# Patient Record
Sex: MALE | Race: WHITE | NOT HISPANIC OR LATINO | Employment: FULL TIME | URBAN - METROPOLITAN AREA
[De-identification: names, ages, dates, MRNs, and addresses within clinical notes are randomized per-mention and may not be internally consistent; named-entity substitution may affect disease eponyms.]

---

## 2022-01-01 ENCOUNTER — APPOINTMENT (OUTPATIENT)
Dept: GENERAL RADIOLOGY | Facility: CLINIC | Age: 62
DRG: 003 | End: 2022-01-01
Attending: INTERNAL MEDICINE
Payer: COMMERCIAL

## 2022-01-01 ENCOUNTER — APPOINTMENT (OUTPATIENT)
Dept: CT IMAGING | Facility: CLINIC | Age: 62
DRG: 003 | End: 2022-01-01
Attending: INTERNAL MEDICINE
Payer: COMMERCIAL

## 2022-01-01 ENCOUNTER — APPOINTMENT (OUTPATIENT)
Dept: NEUROLOGY | Facility: CLINIC | Age: 62
DRG: 003 | End: 2022-01-01
Attending: INTERNAL MEDICINE
Payer: COMMERCIAL

## 2022-01-01 ENCOUNTER — APPOINTMENT (OUTPATIENT)
Dept: CT IMAGING | Facility: CLINIC | Age: 62
DRG: 003 | End: 2022-01-01
Attending: NURSE PRACTITIONER
Payer: COMMERCIAL

## 2022-01-01 ENCOUNTER — ANESTHESIA EVENT (OUTPATIENT)
Dept: SURGERY | Facility: CLINIC | Age: 62
DRG: 003 | End: 2022-01-01
Payer: COMMERCIAL

## 2022-01-01 ENCOUNTER — APPOINTMENT (OUTPATIENT)
Dept: MRI IMAGING | Facility: CLINIC | Age: 62
DRG: 003 | End: 2022-01-01
Payer: COMMERCIAL

## 2022-01-01 ENCOUNTER — APPOINTMENT (OUTPATIENT)
Dept: CARDIOLOGY | Facility: CLINIC | Age: 62
DRG: 003 | End: 2022-01-01
Attending: INTERNAL MEDICINE
Payer: COMMERCIAL

## 2022-01-01 ENCOUNTER — APPOINTMENT (OUTPATIENT)
Dept: GENERAL RADIOLOGY | Facility: CLINIC | Age: 62
DRG: 003 | End: 2022-01-01
Payer: COMMERCIAL

## 2022-01-01 ENCOUNTER — APPOINTMENT (OUTPATIENT)
Dept: ULTRASOUND IMAGING | Facility: CLINIC | Age: 62
DRG: 003 | End: 2022-01-01
Payer: COMMERCIAL

## 2022-01-01 ENCOUNTER — APPOINTMENT (OUTPATIENT)
Dept: CARDIOLOGY | Facility: CLINIC | Age: 62
DRG: 003 | End: 2022-01-01
Attending: PHYSICIAN ASSISTANT
Payer: COMMERCIAL

## 2022-01-01 ENCOUNTER — APPOINTMENT (OUTPATIENT)
Dept: CT IMAGING | Facility: CLINIC | Age: 62
DRG: 003 | End: 2022-01-01
Attending: PHYSICIAN ASSISTANT
Payer: COMMERCIAL

## 2022-01-01 ENCOUNTER — ANESTHESIA EVENT (OUTPATIENT)
Dept: CARDIOLOGY | Facility: CLINIC | Age: 62
DRG: 003 | End: 2022-01-01
Payer: COMMERCIAL

## 2022-01-01 ENCOUNTER — APPOINTMENT (OUTPATIENT)
Dept: CARDIOLOGY | Facility: CLINIC | Age: 62
DRG: 003 | End: 2022-01-01
Payer: COMMERCIAL

## 2022-01-01 ENCOUNTER — APPOINTMENT (OUTPATIENT)
Dept: GENERAL RADIOLOGY | Facility: CLINIC | Age: 62
DRG: 003 | End: 2022-01-01
Attending: STUDENT IN AN ORGANIZED HEALTH CARE EDUCATION/TRAINING PROGRAM
Payer: COMMERCIAL

## 2022-01-01 ENCOUNTER — ANESTHESIA (OUTPATIENT)
Dept: SURGERY | Facility: CLINIC | Age: 62
DRG: 003 | End: 2022-01-01
Payer: COMMERCIAL

## 2022-01-01 ENCOUNTER — HOSPITAL ENCOUNTER (INPATIENT)
Facility: CLINIC | Age: 62
LOS: 14 days | DRG: 003 | End: 2022-09-26
Attending: EMERGENCY MEDICINE | Admitting: STUDENT IN AN ORGANIZED HEALTH CARE EDUCATION/TRAINING PROGRAM
Payer: COMMERCIAL

## 2022-01-01 ENCOUNTER — ANESTHESIA (OUTPATIENT)
Dept: CARDIOLOGY | Facility: CLINIC | Age: 62
DRG: 003 | End: 2022-01-01
Payer: COMMERCIAL

## 2022-01-01 ENCOUNTER — APPOINTMENT (OUTPATIENT)
Dept: ULTRASOUND IMAGING | Facility: CLINIC | Age: 62
DRG: 003 | End: 2022-01-01
Attending: INTERNAL MEDICINE
Payer: COMMERCIAL

## 2022-01-01 VITALS
RESPIRATION RATE: 12 BRPM | SYSTOLIC BLOOD PRESSURE: 130 MMHG | TEMPERATURE: 101.1 F | WEIGHT: 236.99 LBS | HEIGHT: 67 IN | DIASTOLIC BLOOD PRESSURE: 66 MMHG | BODY MASS INDEX: 37.2 KG/M2 | OXYGEN SATURATION: 97 %

## 2022-01-01 DIAGNOSIS — I49.01 VENTRICULAR FIBRILLATION (H): ICD-10-CM

## 2022-01-01 DIAGNOSIS — Z11.52 ENCOUNTER FOR SCREENING LABORATORY TESTING FOR SEVERE ACUTE RESPIRATORY SYNDROME CORONAVIRUS 2 (SARS-COV-2): ICD-10-CM

## 2022-01-01 DIAGNOSIS — I21.01 ACUTE ST ELEVATION MYOCARDIAL INFARCTION (STEMI) INVOLVING LEFT MAIN CORONARY ARTERY (H): Primary | ICD-10-CM

## 2022-01-01 DIAGNOSIS — I46.9 CARDIAC ARREST (H): ICD-10-CM

## 2022-01-01 DIAGNOSIS — I21.3 ST ELEVATION MI (STEMI) (H): ICD-10-CM

## 2022-01-01 LAB
7AMINOCLONAZEPAM SERPL-MCNC: NEGATIVE NG/ML
7AMINOCLONAZEPAM SERPL-MCNC: NEGATIVE NG/ML
ABO/RH(D): NORMAL
ACT BLD: 130 SECONDS (ref 74–150)
ACT BLD: 131 SECONDS (ref 74–150)
ACT BLD: 135 SECONDS (ref 74–150)
ACT BLD: 139 SECONDS (ref 74–150)
ACT BLD: 143 SECONDS (ref 74–150)
ACT BLD: 148 SECONDS (ref 74–150)
ACT BLD: 148 SECONDS (ref 74–150)
ACT BLD: 152 SECONDS (ref 74–150)
ACT BLD: 156 SECONDS (ref 74–150)
ACT BLD: 160 SECONDS (ref 74–150)
ACT BLD: 165 SECONDS (ref 74–150)
ACT BLD: 169 SECONDS (ref 74–150)
ACT BLD: 169 SECONDS (ref 74–150)
ACT BLD: 173 SECONDS (ref 74–150)
ACT BLD: 177 SECONDS (ref 74–150)
ACT BLD: 182 SECONDS (ref 74–150)
ACT BLD: 186 SECONDS (ref 74–150)
ACT BLD: 194 SECONDS (ref 74–150)
ACT BLD: 194 SECONDS (ref 74–150)
ACT BLD: 224 SECONDS (ref 74–150)
ACT BLD: 233 SECONDS (ref 74–150)
ACT BLD: 241 SECONDS (ref 74–150)
ACT BLD: 241 SECONDS (ref 74–150)
ACT BLD: 250 SECONDS (ref 74–150)
ACT BLD: 254 SECONDS (ref 74–150)
ACT BLD: 275 SECONDS (ref 74–150)
ACT BLD: 280 SECONDS (ref 74–150)
ACT BLD: 280 SECONDS (ref 74–150)
ACT BLD: 297 SECONDS (ref 74–150)
ACT BLD: 318 SECONDS (ref 74–150)
ACT BLD: 394 SECONDS (ref 74–150)
ALBUMIN SERPL BCG-MCNC: 2.1 G/DL (ref 3.5–5.2)
ALBUMIN SERPL BCG-MCNC: 2.3 G/DL (ref 3.5–5.2)
ALBUMIN SERPL BCG-MCNC: 2.4 G/DL (ref 3.5–5.2)
ALBUMIN SERPL BCG-MCNC: 2.5 G/DL (ref 3.5–5.2)
ALBUMIN SERPL BCG-MCNC: 2.6 G/DL (ref 3.5–5.2)
ALBUMIN SERPL BCG-MCNC: 2.7 G/DL (ref 3.5–5.2)
ALBUMIN SERPL BCG-MCNC: 2.9 G/DL (ref 3.5–5.2)
ALBUMIN SERPL BCG-MCNC: 2.9 G/DL (ref 3.5–5.2)
ALBUMIN SERPL BCG-MCNC: 3 G/DL (ref 3.5–5.2)
ALBUMIN SERPL BCG-MCNC: 3 G/DL (ref 3.5–5.2)
ALBUMIN SERPL BCG-MCNC: 3.1 G/DL (ref 3.5–5.2)
ALBUMIN SERPL BCG-MCNC: 3.2 G/DL (ref 3.5–5.2)
ALBUMIN UR-MCNC: 10 MG/DL
ALBUMIN UR-MCNC: 50 MG/DL
ALP SERPL-CCNC: 101 U/L (ref 40–129)
ALP SERPL-CCNC: 101 U/L (ref 40–129)
ALP SERPL-CCNC: 102 U/L (ref 40–129)
ALP SERPL-CCNC: 102 U/L (ref 40–129)
ALP SERPL-CCNC: 103 U/L (ref 40–129)
ALP SERPL-CCNC: 106 U/L (ref 40–129)
ALP SERPL-CCNC: 106 U/L (ref 40–129)
ALP SERPL-CCNC: 109 U/L (ref 40–129)
ALP SERPL-CCNC: 110 U/L (ref 40–129)
ALP SERPL-CCNC: 112 U/L (ref 40–129)
ALP SERPL-CCNC: 114 U/L (ref 40–129)
ALP SERPL-CCNC: 115 U/L (ref 40–129)
ALP SERPL-CCNC: 117 U/L (ref 40–129)
ALP SERPL-CCNC: 118 U/L (ref 40–129)
ALP SERPL-CCNC: 121 U/L (ref 40–129)
ALP SERPL-CCNC: 122 U/L (ref 40–129)
ALP SERPL-CCNC: 130 U/L (ref 40–129)
ALP SERPL-CCNC: 136 U/L (ref 40–129)
ALP SERPL-CCNC: 139 U/L (ref 40–129)
ALP SERPL-CCNC: 143 U/L (ref 40–129)
ALP SERPL-CCNC: 144 U/L (ref 40–129)
ALP SERPL-CCNC: 146 U/L (ref 40–129)
ALP SERPL-CCNC: 148 U/L (ref 40–129)
ALP SERPL-CCNC: 67 U/L (ref 40–129)
ALP SERPL-CCNC: 72 U/L (ref 40–129)
ALP SERPL-CCNC: 74 U/L (ref 40–129)
ALP SERPL-CCNC: 74 U/L (ref 40–129)
ALP SERPL-CCNC: 75 U/L (ref 40–129)
ALP SERPL-CCNC: 77 U/L (ref 40–129)
ALP SERPL-CCNC: 78 U/L (ref 40–129)
ALP SERPL-CCNC: 82 U/L (ref 40–129)
ALP SERPL-CCNC: 86 U/L (ref 40–129)
ALP SERPL-CCNC: 88 U/L (ref 40–129)
ALP SERPL-CCNC: 88 U/L (ref 40–129)
ALP SERPL-CCNC: 90 U/L (ref 40–129)
ALP SERPL-CCNC: 91 U/L (ref 40–129)
ALP SERPL-CCNC: 92 U/L (ref 40–129)
ALP SERPL-CCNC: 94 U/L (ref 40–129)
ALP SERPL-CCNC: 94 U/L (ref 40–129)
ALP SERPL-CCNC: 95 U/L (ref 40–129)
ALP SERPL-CCNC: 98 U/L (ref 40–129)
ALP SERPL-CCNC: 98 U/L (ref 40–129)
ALP SERPL-CCNC: 99 U/L (ref 40–129)
ALPRAZ SERPL-MCNC: NEGATIVE NG/ML
ALPRAZ SERPL-MCNC: NEGATIVE NG/ML
ALT SERPL W P-5'-P-CCNC: 14 U/L (ref 10–50)
ALT SERPL W P-5'-P-CCNC: 15 U/L (ref 10–50)
ALT SERPL W P-5'-P-CCNC: 16 U/L (ref 10–50)
ALT SERPL W P-5'-P-CCNC: 17 U/L (ref 10–50)
ALT SERPL W P-5'-P-CCNC: 18 U/L (ref 10–50)
ALT SERPL W P-5'-P-CCNC: 18 U/L (ref 10–50)
ALT SERPL W P-5'-P-CCNC: 19 U/L (ref 10–50)
ALT SERPL W P-5'-P-CCNC: 20 U/L (ref 10–50)
ALT SERPL W P-5'-P-CCNC: 20 U/L (ref 10–50)
ALT SERPL W P-5'-P-CCNC: 21 U/L (ref 10–50)
ALT SERPL W P-5'-P-CCNC: 23 U/L (ref 10–50)
ALT SERPL W P-5'-P-CCNC: 24 U/L (ref 10–50)
ALT SERPL W P-5'-P-CCNC: 25 U/L (ref 10–50)
ALT SERPL W P-5'-P-CCNC: 27 U/L (ref 10–50)
ALT SERPL W P-5'-P-CCNC: 29 U/L (ref 10–50)
ALT SERPL W P-5'-P-CCNC: 31 U/L (ref 10–50)
ALT SERPL W P-5'-P-CCNC: 33 U/L (ref 10–50)
ALT SERPL W P-5'-P-CCNC: 33 U/L (ref 10–50)
ALT SERPL W P-5'-P-CCNC: 34 U/L (ref 10–50)
ALT SERPL W P-5'-P-CCNC: 36 U/L (ref 10–50)
ALT SERPL W P-5'-P-CCNC: 38 U/L (ref 10–50)
ALT SERPL W P-5'-P-CCNC: 38 U/L (ref 10–50)
ALT SERPL W P-5'-P-CCNC: 39 U/L (ref 10–50)
ALT SERPL W P-5'-P-CCNC: 39 U/L (ref 10–50)
ALT SERPL W P-5'-P-CCNC: 41 U/L (ref 10–50)
ALT SERPL W P-5'-P-CCNC: 42 U/L (ref 10–50)
ALT SERPL W P-5'-P-CCNC: 45 U/L (ref 10–50)
ALT SERPL W P-5'-P-CCNC: 46 U/L (ref 10–50)
ALT SERPL W P-5'-P-CCNC: 47 U/L (ref 10–50)
ALT SERPL W P-5'-P-CCNC: 49 U/L (ref 10–50)
ALT SERPL W P-5'-P-CCNC: 50 U/L (ref 10–50)
ALT SERPL W P-5'-P-CCNC: 50 U/L (ref 10–50)
ALT SERPL W P-5'-P-CCNC: 54 U/L (ref 10–50)
ALT SERPL W P-5'-P-CCNC: 57 U/L (ref 10–50)
ALT SERPL W P-5'-P-CCNC: 58 U/L (ref 10–50)
AMPHET BLD CFM-MCNC: NEGATIVE NG/ML
AMPHETAMINES UR QL SCN: ABNORMAL
ANION GAP SERPL CALCULATED.3IONS-SCNC: 10 MMOL/L (ref 7–15)
ANION GAP SERPL CALCULATED.3IONS-SCNC: 11 MMOL/L (ref 7–15)
ANION GAP SERPL CALCULATED.3IONS-SCNC: 12 MMOL/L (ref 7–15)
ANION GAP SERPL CALCULATED.3IONS-SCNC: 14 MMOL/L (ref 7–15)
ANION GAP SERPL CALCULATED.3IONS-SCNC: 4 MMOL/L (ref 7–15)
ANION GAP SERPL CALCULATED.3IONS-SCNC: 4 MMOL/L (ref 7–15)
ANION GAP SERPL CALCULATED.3IONS-SCNC: 5 MMOL/L (ref 7–15)
ANION GAP SERPL CALCULATED.3IONS-SCNC: 6 MMOL/L (ref 7–15)
ANION GAP SERPL CALCULATED.3IONS-SCNC: 7 MMOL/L (ref 7–15)
ANION GAP SERPL CALCULATED.3IONS-SCNC: 8 MMOL/L (ref 7–15)
ANION GAP SERPL CALCULATED.3IONS-SCNC: 9 MMOL/L (ref 7–15)
ANTIBODY SCREEN: NEGATIVE
APAP BLD-MCNC: NEGATIVE UG/ML
APPEARANCE UR: CLEAR
APPEARANCE UR: CLEAR
APTT PPP: 103 SECONDS (ref 22–38)
APTT PPP: 108 SECONDS (ref 22–38)
APTT PPP: 110 SECONDS (ref 22–38)
APTT PPP: 110 SECONDS (ref 22–38)
APTT PPP: 113 SECONDS (ref 22–38)
APTT PPP: 128 SECONDS (ref 22–38)
APTT PPP: 128 SECONDS (ref 22–38)
APTT PPP: 134 SECONDS (ref 22–38)
APTT PPP: 137 SECONDS (ref 22–38)
APTT PPP: 144 SECONDS (ref 22–38)
APTT PPP: 28 SECONDS (ref 22–38)
APTT PPP: 32 SECONDS (ref 22–38)
APTT PPP: 33 SECONDS (ref 22–38)
APTT PPP: 34 SECONDS (ref 22–38)
APTT PPP: 34 SECONDS (ref 22–38)
APTT PPP: 35 SECONDS (ref 22–38)
APTT PPP: 36 SECONDS (ref 22–38)
APTT PPP: 36 SECONDS (ref 22–38)
APTT PPP: 40 SECONDS (ref 22–38)
APTT PPP: 43 SECONDS (ref 22–38)
APTT PPP: 44 SECONDS (ref 22–38)
APTT PPP: 45 SECONDS (ref 22–38)
APTT PPP: 57 SECONDS (ref 22–38)
APTT PPP: 74 SECONDS (ref 22–38)
APTT PPP: 74 SECONDS (ref 22–38)
APTT PPP: 78 SECONDS (ref 22–38)
APTT PPP: 79 SECONDS (ref 22–38)
APTT PPP: 79 SECONDS (ref 22–38)
APTT PPP: 88 SECONDS (ref 22–38)
APTT PPP: 89 SECONDS (ref 22–38)
APTT PPP: 89 SECONDS (ref 22–38)
APTT PPP: 91 SECONDS (ref 22–38)
APTT PPP: >240 SECONDS (ref 22–38)
APTT PPP: >240 SECONDS (ref 22–38)
AST SERPL W P-5'-P-CCNC: 100 U/L (ref 10–50)
AST SERPL W P-5'-P-CCNC: 100 U/L (ref 10–50)
AST SERPL W P-5'-P-CCNC: 113 U/L (ref 10–50)
AST SERPL W P-5'-P-CCNC: 130 U/L (ref 10–50)
AST SERPL W P-5'-P-CCNC: 25 U/L (ref 10–50)
AST SERPL W P-5'-P-CCNC: 26 U/L (ref 10–50)
AST SERPL W P-5'-P-CCNC: 27 U/L (ref 10–50)
AST SERPL W P-5'-P-CCNC: 27 U/L (ref 10–50)
AST SERPL W P-5'-P-CCNC: 28 U/L (ref 10–50)
AST SERPL W P-5'-P-CCNC: 28 U/L (ref 10–50)
AST SERPL W P-5'-P-CCNC: 29 U/L (ref 10–50)
AST SERPL W P-5'-P-CCNC: 30 U/L (ref 10–50)
AST SERPL W P-5'-P-CCNC: 30 U/L (ref 10–50)
AST SERPL W P-5'-P-CCNC: 32 U/L (ref 10–50)
AST SERPL W P-5'-P-CCNC: 33 U/L (ref 10–50)
AST SERPL W P-5'-P-CCNC: 33 U/L (ref 10–50)
AST SERPL W P-5'-P-CCNC: 34 U/L (ref 10–50)
AST SERPL W P-5'-P-CCNC: 35 U/L (ref 10–50)
AST SERPL W P-5'-P-CCNC: 35 U/L (ref 10–50)
AST SERPL W P-5'-P-CCNC: 36 U/L (ref 10–50)
AST SERPL W P-5'-P-CCNC: 36 U/L (ref 10–50)
AST SERPL W P-5'-P-CCNC: 37 U/L (ref 10–50)
AST SERPL W P-5'-P-CCNC: 38 U/L (ref 10–50)
AST SERPL W P-5'-P-CCNC: 39 U/L (ref 10–50)
AST SERPL W P-5'-P-CCNC: 40 U/L (ref 10–50)
AST SERPL W P-5'-P-CCNC: 43 U/L (ref 10–50)
AST SERPL W P-5'-P-CCNC: 46 U/L (ref 10–50)
AST SERPL W P-5'-P-CCNC: 47 U/L (ref 10–50)
AST SERPL W P-5'-P-CCNC: 48 U/L (ref 10–50)
AST SERPL W P-5'-P-CCNC: 48 U/L (ref 10–50)
AST SERPL W P-5'-P-CCNC: 50 U/L (ref 10–50)
AST SERPL W P-5'-P-CCNC: 50 U/L (ref 10–50)
AST SERPL W P-5'-P-CCNC: 53 U/L (ref 10–50)
AST SERPL W P-5'-P-CCNC: 55 U/L (ref 10–50)
AST SERPL W P-5'-P-CCNC: 65 U/L (ref 10–50)
AST SERPL W P-5'-P-CCNC: 66 U/L (ref 10–50)
AST SERPL W P-5'-P-CCNC: 70 U/L (ref 10–50)
AST SERPL W P-5'-P-CCNC: 73 U/L (ref 10–50)
AST SERPL W P-5'-P-CCNC: 73 U/L (ref 10–50)
AST SERPL W P-5'-P-CCNC: 77 U/L (ref 10–50)
AST SERPL W P-5'-P-CCNC: 78 U/L (ref 10–50)
AST SERPL W P-5'-P-CCNC: 81 U/L (ref 10–50)
AST SERPL W P-5'-P-CCNC: 82 U/L (ref 10–50)
AST SERPL W P-5'-P-CCNC: 82 U/L (ref 10–50)
AST SERPL W P-5'-P-CCNC: 95 U/L (ref 10–50)
AT III ACT/NOR PPP CHRO: 66 % (ref 85–135)
AT III ACT/NOR PPP CHRO: 68 % (ref 85–135)
AT III ACT/NOR PPP CHRO: 68 % (ref 85–135)
AT III ACT/NOR PPP CHRO: 74 % (ref 85–135)
AT III ACT/NOR PPP CHRO: 79 % (ref 85–135)
AT III ACT/NOR PPP CHRO: 92 % (ref 85–135)
ATRIAL RATE - MUSE: 56 BPM
ATRIAL RATE - MUSE: 63 BPM
ATRIAL RATE - MUSE: 63 BPM
ATRIAL RATE - MUSE: 67 BPM
ATRIAL RATE - MUSE: 68 BPM
ATRIAL RATE - MUSE: 68 BPM
ATRIAL RATE - MUSE: 70 BPM
ATRIAL RATE - MUSE: 74 BPM
ATRIAL RATE - MUSE: 77 BPM
ATRIAL RATE - MUSE: 78 BPM
ATRIAL RATE - MUSE: 80 BPM
ATRIAL RATE - MUSE: 86 BPM
ATRIAL RATE - MUSE: 92 BPM
ATRIAL RATE - MUSE: 95 BPM
BACTERIA BLD CULT: NO GROWTH
BACTERIA SPT CULT: NO GROWTH
BACTERIA SPT CULT: NORMAL
BACTERIA UR CULT: NO GROWTH
BARBITURATES SPEC-MCNC: NEGATIVE UG/ML
BARBITURATES UR QL SCN: ABNORMAL
BASE EXCESS BLDA CALC-SCNC: -0.1 MMOL/L (ref -9–1.8)
BASE EXCESS BLDA CALC-SCNC: -0.3 MMOL/L (ref -9–1.8)
BASE EXCESS BLDA CALC-SCNC: -0.5 MMOL/L (ref -9–1.8)
BASE EXCESS BLDA CALC-SCNC: -0.8 MMOL/L (ref -9–1.8)
BASE EXCESS BLDA CALC-SCNC: -1.1 MMOL/L (ref -9–1.8)
BASE EXCESS BLDA CALC-SCNC: -1.1 MMOL/L (ref -9–1.8)
BASE EXCESS BLDA CALC-SCNC: -1.4 MMOL/L (ref -9–1.8)
BASE EXCESS BLDA CALC-SCNC: -1.7 MMOL/L (ref -9–1.8)
BASE EXCESS BLDA CALC-SCNC: -1.8 MMOL/L (ref -9–1.8)
BASE EXCESS BLDA CALC-SCNC: -1.9 MMOL/L (ref -9–1.8)
BASE EXCESS BLDA CALC-SCNC: -2.2 MMOL/L (ref -9–1.8)
BASE EXCESS BLDA CALC-SCNC: -2.9 MMOL/L (ref -9.6–2)
BASE EXCESS BLDA CALC-SCNC: -3.2 MMOL/L (ref -9–1.8)
BASE EXCESS BLDA CALC-SCNC: -3.3 MMOL/L (ref -9–1.8)
BASE EXCESS BLDA CALC-SCNC: -3.4 MMOL/L (ref -9–1.8)
BASE EXCESS BLDA CALC-SCNC: -3.6 MMOL/L (ref -9–1.8)
BASE EXCESS BLDA CALC-SCNC: -3.6 MMOL/L (ref -9–1.8)
BASE EXCESS BLDA CALC-SCNC: -3.7 MMOL/L (ref -9–1.8)
BASE EXCESS BLDA CALC-SCNC: -5 MMOL/L (ref -9–1.8)
BASE EXCESS BLDA CALC-SCNC: -5.4 MMOL/L (ref -9.6–2)
BASE EXCESS BLDA CALC-SCNC: -5.7 MMOL/L (ref -9.6–2)
BASE EXCESS BLDA CALC-SCNC: -6.9 MMOL/L (ref -9.6–2)
BASE EXCESS BLDA CALC-SCNC: -7.1 MMOL/L (ref -9.6–2)
BASE EXCESS BLDA CALC-SCNC: 0.1 MMOL/L (ref -9–1.8)
BASE EXCESS BLDA CALC-SCNC: 0.8 MMOL/L (ref -9–1.8)
BASE EXCESS BLDA CALC-SCNC: 1.2 MMOL/L (ref -9–1.8)
BASE EXCESS BLDA CALC-SCNC: 1.3 MMOL/L (ref -9–1.8)
BASE EXCESS BLDA CALC-SCNC: 1.5 MMOL/L (ref -9–1.8)
BASE EXCESS BLDA CALC-SCNC: 1.9 MMOL/L (ref -9–1.8)
BASE EXCESS BLDA CALC-SCNC: 10.8 MMOL/L (ref -9–1.8)
BASE EXCESS BLDA CALC-SCNC: 11 MMOL/L (ref -9.6–2)
BASE EXCESS BLDA CALC-SCNC: 2.3 MMOL/L (ref -9–1.8)
BASE EXCESS BLDA CALC-SCNC: 2.3 MMOL/L (ref -9–1.8)
BASE EXCESS BLDA CALC-SCNC: 3.5 MMOL/L (ref -9–1.8)
BASE EXCESS BLDA CALC-SCNC: 3.7 MMOL/L (ref -9–1.8)
BASE EXCESS BLDA CALC-SCNC: 3.8 MMOL/L (ref -9–1.8)
BASE EXCESS BLDA CALC-SCNC: 3.8 MMOL/L (ref -9–1.8)
BASE EXCESS BLDA CALC-SCNC: 3.9 MMOL/L (ref -9–1.8)
BASE EXCESS BLDA CALC-SCNC: 4.1 MMOL/L (ref -9–1.8)
BASE EXCESS BLDA CALC-SCNC: 4.2 MMOL/L (ref -9–1.8)
BASE EXCESS BLDA CALC-SCNC: 4.3 MMOL/L (ref -9–1.8)
BASE EXCESS BLDA CALC-SCNC: 4.3 MMOL/L (ref -9–1.8)
BASE EXCESS BLDA CALC-SCNC: 4.4 MMOL/L (ref -9–1.8)
BASE EXCESS BLDA CALC-SCNC: 4.5 MMOL/L (ref -9–1.8)
BASE EXCESS BLDA CALC-SCNC: 4.5 MMOL/L (ref -9–1.8)
BASE EXCESS BLDA CALC-SCNC: 4.6 MMOL/L (ref -9–1.8)
BASE EXCESS BLDA CALC-SCNC: 4.6 MMOL/L (ref -9–1.8)
BASE EXCESS BLDA CALC-SCNC: 4.7 MMOL/L (ref -9–1.8)
BASE EXCESS BLDA CALC-SCNC: 4.8 MMOL/L (ref -9–1.8)
BASE EXCESS BLDA CALC-SCNC: 4.9 MMOL/L (ref -9–1.8)
BASE EXCESS BLDA CALC-SCNC: 4.9 MMOL/L (ref -9–1.8)
BASE EXCESS BLDA CALC-SCNC: 5 MMOL/L (ref -9–1.8)
BASE EXCESS BLDA CALC-SCNC: 5 MMOL/L (ref -9–1.8)
BASE EXCESS BLDA CALC-SCNC: 5.1 MMOL/L (ref -9–1.8)
BASE EXCESS BLDA CALC-SCNC: 5.1 MMOL/L (ref -9–1.8)
BASE EXCESS BLDA CALC-SCNC: 5.2 MMOL/L (ref -9–1.8)
BASE EXCESS BLDA CALC-SCNC: 5.3 MMOL/L (ref -9–1.8)
BASE EXCESS BLDA CALC-SCNC: 5.4 MMOL/L (ref -9–1.8)
BASE EXCESS BLDA CALC-SCNC: 5.5 MMOL/L (ref -9–1.8)
BASE EXCESS BLDA CALC-SCNC: 5.5 MMOL/L (ref -9–1.8)
BASE EXCESS BLDA CALC-SCNC: 5.6 MMOL/L (ref -9.6–2)
BASE EXCESS BLDA CALC-SCNC: 5.6 MMOL/L (ref -9–1.8)
BASE EXCESS BLDA CALC-SCNC: 5.6 MMOL/L (ref -9–1.8)
BASE EXCESS BLDA CALC-SCNC: 5.7 MMOL/L (ref -9.6–2)
BASE EXCESS BLDA CALC-SCNC: 5.7 MMOL/L (ref -9–1.8)
BASE EXCESS BLDA CALC-SCNC: 5.8 MMOL/L (ref -9–1.8)
BASE EXCESS BLDA CALC-SCNC: 5.9 MMOL/L (ref -9–1.8)
BASE EXCESS BLDA CALC-SCNC: 5.9 MMOL/L (ref -9–1.8)
BASE EXCESS BLDA CALC-SCNC: 6 MMOL/L (ref -9–1.8)
BASE EXCESS BLDA CALC-SCNC: 6 MMOL/L (ref -9–1.8)
BASE EXCESS BLDA CALC-SCNC: 6.1 MMOL/L (ref -9–1.8)
BASE EXCESS BLDA CALC-SCNC: 6.1 MMOL/L (ref -9–1.8)
BASE EXCESS BLDA CALC-SCNC: 6.2 MMOL/L (ref -9.6–2)
BASE EXCESS BLDA CALC-SCNC: 6.2 MMOL/L (ref -9.6–2)
BASE EXCESS BLDA CALC-SCNC: 6.2 MMOL/L (ref -9–1.8)
BASE EXCESS BLDA CALC-SCNC: 6.2 MMOL/L (ref -9–1.8)
BASE EXCESS BLDA CALC-SCNC: 6.3 MMOL/L (ref -9–1.8)
BASE EXCESS BLDA CALC-SCNC: 6.4 MMOL/L (ref -9.6–2)
BASE EXCESS BLDA CALC-SCNC: 6.4 MMOL/L (ref -9–1.8)
BASE EXCESS BLDA CALC-SCNC: 6.4 MMOL/L (ref -9–1.8)
BASE EXCESS BLDA CALC-SCNC: 6.5 MMOL/L (ref -9–1.8)
BASE EXCESS BLDA CALC-SCNC: 6.5 MMOL/L (ref -9–1.8)
BASE EXCESS BLDA CALC-SCNC: 6.7 MMOL/L (ref -9–1.8)
BASE EXCESS BLDA CALC-SCNC: 6.8 MMOL/L (ref -9–1.8)
BASE EXCESS BLDA CALC-SCNC: 6.9 MMOL/L (ref -9–1.8)
BASE EXCESS BLDA CALC-SCNC: 7.1 MMOL/L (ref -9–1.8)
BASE EXCESS BLDA CALC-SCNC: 7.1 MMOL/L (ref -9–1.8)
BASE EXCESS BLDA CALC-SCNC: 7.2 MMOL/L (ref -9–1.8)
BASE EXCESS BLDA CALC-SCNC: 7.2 MMOL/L (ref -9–1.8)
BASE EXCESS BLDA CALC-SCNC: 7.3 MMOL/L (ref -9–1.8)
BASE EXCESS BLDA CALC-SCNC: 7.4 MMOL/L (ref -9–1.8)
BASE EXCESS BLDA CALC-SCNC: 7.5 MMOL/L (ref -9–1.8)
BASE EXCESS BLDA CALC-SCNC: 7.6 MMOL/L (ref -9–1.8)
BASE EXCESS BLDA CALC-SCNC: 7.6 MMOL/L (ref -9–1.8)
BASE EXCESS BLDA CALC-SCNC: 7.7 MMOL/L (ref -9–1.8)
BASE EXCESS BLDA CALC-SCNC: 7.7 MMOL/L (ref -9–1.8)
BASE EXCESS BLDA CALC-SCNC: 7.9 MMOL/L (ref -9.6–2)
BASE EXCESS BLDA CALC-SCNC: 8 MMOL/L (ref -9–1.8)
BASE EXCESS BLDA CALC-SCNC: 8.3 MMOL/L (ref -9–1.8)
BASE EXCESS BLDA CALC-SCNC: 8.5 MMOL/L (ref -9–1.8)
BASE EXCESS BLDA CALC-SCNC: 8.6 MMOL/L (ref -9–1.8)
BASE EXCESS BLDA CALC-SCNC: 8.9 MMOL/L (ref -9–1.8)
BASE EXCESS BLDV CALC-SCNC: -0.5 MMOL/L (ref -7.7–1.9)
BASE EXCESS BLDV CALC-SCNC: -1.4 MMOL/L (ref -7.7–1.9)
BASE EXCESS BLDV CALC-SCNC: -2.8 MMOL/L (ref -7.7–1.9)
BASE EXCESS BLDV CALC-SCNC: -3.1 MMOL/L (ref -7.7–1.9)
BASE EXCESS BLDV CALC-SCNC: 2 MMOL/L (ref -7.7–1.9)
BASE EXCESS BLDV CALC-SCNC: 4 MMOL/L (ref -7.7–1.9)
BASE EXCESS BLDV CALC-SCNC: 4.7 MMOL/L (ref -7.7–1.9)
BASE EXCESS BLDV CALC-SCNC: 5.1 MMOL/L (ref -7.7–1.9)
BASE EXCESS BLDV CALC-SCNC: 5.4 MMOL/L (ref -7.7–1.9)
BASE EXCESS BLDV CALC-SCNC: 6.3 MMOL/L (ref -7.7–1.9)
BASE EXCESS BLDV CALC-SCNC: 6.3 MMOL/L (ref -7.7–1.9)
BASE EXCESS BLDV CALC-SCNC: 6.7 MMOL/L (ref -7.7–1.9)
BASE EXCESS BLDV CALC-SCNC: 7.5 MMOL/L (ref -7.7–1.9)
BASE EXCESS BLDV CALC-SCNC: 7.7 MMOL/L (ref -7.7–1.9)
BASOPHILS # BLD AUTO: 0 10E3/UL (ref 0–0.2)
BASOPHILS # BLD AUTO: 0.1 10E3/UL (ref 0–0.2)
BASOPHILS NFR BLD AUTO: 0 %
BASOPHILS NFR BLD AUTO: 0 %
BENZODIAZ SPEC QL: POSITIVE
BENZODIAZ SPEC QL: POSITIVE
BENZODIAZ SPEC-MCNC: ABNORMAL NG/ML
BENZODIAZ UR QL SCN: ABNORMAL
BI-PLANE LVEF ECHO: NORMAL
BILIRUB SERPL-MCNC: 0.2 MG/DL
BILIRUB SERPL-MCNC: 0.3 MG/DL
BILIRUB SERPL-MCNC: 0.4 MG/DL
BILIRUB SERPL-MCNC: 0.5 MG/DL
BILIRUB SERPL-MCNC: 0.6 MG/DL
BILIRUB SERPL-MCNC: 0.8 MG/DL
BILIRUB UR QL STRIP: NEGATIVE
BILIRUB UR QL STRIP: NEGATIVE
BLD PROD TYP BPU: NORMAL
BLOOD COMPONENT TYPE: NORMAL
BUN SERPL-MCNC: 12.4 MG/DL (ref 8–23)
BUN SERPL-MCNC: 13.3 MG/DL (ref 8–23)
BUN SERPL-MCNC: 15.1 MG/DL (ref 8–23)
BUN SERPL-MCNC: 16.2 MG/DL (ref 8–23)
BUN SERPL-MCNC: 16.5 MG/DL (ref 8–23)
BUN SERPL-MCNC: 17 MG/DL (ref 8–23)
BUN SERPL-MCNC: 17 MG/DL (ref 8–23)
BUN SERPL-MCNC: 17.4 MG/DL (ref 8–23)
BUN SERPL-MCNC: 17.9 MG/DL (ref 8–23)
BUN SERPL-MCNC: 18.3 MG/DL (ref 8–23)
BUN SERPL-MCNC: 18.5 MG/DL (ref 8–23)
BUN SERPL-MCNC: 20.2 MG/DL (ref 8–23)
BUN SERPL-MCNC: 22.5 MG/DL (ref 8–23)
BUN SERPL-MCNC: 23.5 MG/DL (ref 8–23)
BUN SERPL-MCNC: 24.3 MG/DL (ref 8–23)
BUN SERPL-MCNC: 27 MG/DL (ref 8–23)
BUN SERPL-MCNC: 27.2 MG/DL (ref 8–23)
BUN SERPL-MCNC: 27.7 MG/DL (ref 8–23)
BUN SERPL-MCNC: 30 MG/DL (ref 8–23)
BUN SERPL-MCNC: 30.5 MG/DL (ref 8–23)
BUN SERPL-MCNC: 31 MG/DL (ref 8–23)
BUN SERPL-MCNC: 31.4 MG/DL (ref 8–23)
BUN SERPL-MCNC: 33.9 MG/DL (ref 8–23)
BUN SERPL-MCNC: 38.8 MG/DL (ref 8–23)
BUN SERPL-MCNC: 42.6 MG/DL (ref 8–23)
BUN SERPL-MCNC: 43.8 MG/DL (ref 8–23)
BUN SERPL-MCNC: 43.9 MG/DL (ref 8–23)
BUN SERPL-MCNC: 44.8 MG/DL (ref 8–23)
BUN SERPL-MCNC: 46.6 MG/DL (ref 8–23)
BUN SERPL-MCNC: 47.1 MG/DL (ref 8–23)
BUN SERPL-MCNC: 48.5 MG/DL (ref 8–23)
BUN SERPL-MCNC: 49.5 MG/DL (ref 8–23)
BUN SERPL-MCNC: 50.1 MG/DL (ref 8–23)
BUN SERPL-MCNC: 50.2 MG/DL (ref 8–23)
BUN SERPL-MCNC: 50.8 MG/DL (ref 8–23)
BUN SERPL-MCNC: 51.7 MG/DL (ref 8–23)
BUN SERPL-MCNC: 51.7 MG/DL (ref 8–23)
BUN SERPL-MCNC: 52.7 MG/DL (ref 8–23)
BUN SERPL-MCNC: 53.1 MG/DL (ref 8–23)
BUN SERPL-MCNC: 53.2 MG/DL (ref 8–23)
BUN SERPL-MCNC: 54 MG/DL (ref 8–23)
BUN SERPL-MCNC: 54.5 MG/DL (ref 8–23)
BUN SERPL-MCNC: 55.9 MG/DL (ref 8–23)
BUN SERPL-MCNC: 57.6 MG/DL (ref 8–23)
BUN SERPL-MCNC: 58.7 MG/DL (ref 8–23)
BUN SERPL-MCNC: 60 MG/DL (ref 8–23)
BUPRENORPHINE SERPL-MCNC: NEGATIVE NG/ML
BZE BLD CFM-MCNC: NEGATIVE NG/ML
BZE UR QL SCN: ABNORMAL
CA-I BLD-MCNC: 4.2 MG/DL (ref 4.4–5.2)
CA-I BLD-MCNC: 4.3 MG/DL (ref 4.4–5.2)
CA-I BLD-MCNC: 4.4 MG/DL (ref 4.4–5.2)
CA-I BLD-MCNC: 4.5 MG/DL (ref 4.4–5.2)
CA-I BLD-MCNC: 4.6 MG/DL (ref 4.4–5.2)
CA-I BLD-MCNC: 4.7 MG/DL (ref 4.4–5.2)
CA-I BLD-MCNC: 4.8 MG/DL (ref 4.4–5.2)
CA-I BLD-MCNC: 4.9 MG/DL (ref 4.4–5.2)
CALCIUM SERPL-MCNC: 7.9 MG/DL (ref 8.8–10.2)
CALCIUM SERPL-MCNC: 8 MG/DL (ref 8.8–10.2)
CALCIUM SERPL-MCNC: 8.1 MG/DL (ref 8.8–10.2)
CALCIUM SERPL-MCNC: 8.1 MG/DL (ref 8.8–10.2)
CALCIUM SERPL-MCNC: 8.2 MG/DL (ref 8.2–9.6)
CALCIUM SERPL-MCNC: 8.2 MG/DL (ref 8.8–10.2)
CALCIUM SERPL-MCNC: 8.3 MG/DL (ref 8.8–10.2)
CALCIUM SERPL-MCNC: 8.4 MG/DL (ref 8.2–9.6)
CALCIUM SERPL-MCNC: 8.4 MG/DL (ref 8.8–10.2)
CALCIUM SERPL-MCNC: 8.5 MG/DL (ref 8.8–10.2)
CALCIUM SERPL-MCNC: 8.6 MG/DL (ref 8.8–10.2)
CALCIUM SERPL-MCNC: 8.7 MG/DL (ref 8.8–10.2)
CALCIUM SERPL-MCNC: 8.8 MG/DL (ref 8.8–10.2)
CALCIUM SERPL-MCNC: 8.9 MG/DL (ref 8.8–10.2)
CALCIUM SERPL-MCNC: 8.9 MG/DL (ref 8.8–10.2)
CALCIUM SERPL-MCNC: 9 MG/DL (ref 8.8–10.2)
CALCIUM SERPL-MCNC: 9.1 MG/DL (ref 8.8–10.2)
CANNABINOIDS UR QL SCN: ABNORMAL
CARBOXYTHC BLD-MCNC: NEGATIVE NG/ML
CARISOPRODOL IA: NEGATIVE UG/ML
CF REDUC 30M P MA P HEP LENFR BLD TEG: 0 % (ref 0–8)
CF REDUC 30M P MA P HEP LENFR BLD TEG: 0.1 % (ref 0–8)
CF REDUC 30M P MA P HEP LENFR BLD TEG: 0.7 % (ref 0–8)
CF REDUC 60M P MA P HEPASE LENFR BLD TEG: 0.9 % (ref 0–15)
CF REDUC 60M P MA P HEPASE LENFR BLD TEG: 1 % (ref 0–15)
CF REDUC 60M P MA P HEPASE LENFR BLD TEG: 1.3 % (ref 0–15)
CF REDUC 60M P MA P HEPASE LENFR BLD TEG: 1.5 % (ref 0–15)
CF REDUC 60M P MA P HEPASE LENFR BLD TEG: 3 % (ref 0–15)
CFT P HPASE BLD TEG: 1 MINUTE (ref 1–3)
CFT P HPASE BLD TEG: 1.1 MINUTE (ref 1–3)
CFT P HPASE BLD TEG: 1.3 MINUTE (ref 1–3)
CFT P HPASE BLD TEG: 1.3 MINUTE (ref 1–3)
CFT P HPASE BLD TEG: 1.9 MINUTE (ref 1–3)
CHLORDIAZEP SERPL-MCNC: NEGATIVE NG/ML
CHLORDIAZEP SERPL-MCNC: NEGATIVE NG/ML
CHLORIDE SERPL-SCNC: 101 MMOL/L (ref 98–107)
CHLORIDE SERPL-SCNC: 102 MMOL/L (ref 98–107)
CHLORIDE SERPL-SCNC: 102 MMOL/L (ref 98–107)
CHLORIDE SERPL-SCNC: 103 MMOL/L (ref 98–107)
CHLORIDE SERPL-SCNC: 103 MMOL/L (ref 98–107)
CHLORIDE SERPL-SCNC: 104 MMOL/L (ref 98–107)
CHLORIDE SERPL-SCNC: 105 MMOL/L (ref 98–107)
CHLORIDE SERPL-SCNC: 106 MMOL/L (ref 98–107)
CHLORIDE SERPL-SCNC: 107 MMOL/L (ref 98–107)
CHLORIDE SERPL-SCNC: 108 MMOL/L (ref 98–107)
CHLORIDE SERPL-SCNC: 110 MMOL/L (ref 98–107)
CHLORIDE SERPL-SCNC: 111 MMOL/L (ref 98–107)
CHLORIDE SERPL-SCNC: 111 MMOL/L (ref 98–107)
CHLORIDE SERPL-SCNC: 112 MMOL/L (ref 98–107)
CHLORIDE SERPL-SCNC: 113 MMOL/L (ref 98–107)
CHLORIDE SERPL-SCNC: 114 MMOL/L (ref 98–107)
CHLORIDE SERPL-SCNC: 121 MMOL/L (ref 98–107)
CHOLEST SERPL-MCNC: 78 MG/DL
CI (COAGULATION INDEX)(Z): -0.9 (ref -3–3)
CI (COAGULATION INDEX)(Z): 1.2 (ref -3–3)
CI (COAGULATION INDEX)(Z): 2.3 (ref -3–3)
CI (COAGULATION INDEX)(Z): 2.6 (ref -3–3)
CI (COAGULATION INDEX)(Z): 4.1 (ref -3–3)
CLONAZEPAM SERPL-MCNC: NEGATIVE NG/ML
CLONAZEPAM SERPL-MCNC: NEGATIVE NG/ML
CLOT ANGLE P HPASE BLD TEG: 63.7 DEGREES (ref 53–72)
CLOT ANGLE P HPASE BLD TEG: 72.2 DEGREES (ref 53–72)
CLOT ANGLE P HPASE BLD TEG: 73.1 DEGREES (ref 53–72)
CLOT ANGLE P HPASE BLD TEG: 74.9 DEGREES (ref 53–72)
CLOT ANGLE P HPASE BLD TEG: 75.3 DEGREES (ref 53–72)
CLOT INIT P HPASE BLD TEG: 4.8 MINUTE (ref 5–10)
CLOT INIT P HPASE BLD TEG: 6.5 MINUTE (ref 5–10)
CLOT INIT P HPASE BLD TEG: 7.3 MINUTE (ref 5–10)
CLOT INIT P HPASE BLD TEG: 8.2 MINUTE (ref 5–10)
CLOT INIT P HPASE BLD TEG: 9.9 MINUTE (ref 5–10)
CLOT STRENGTH P HPASE BLD TEG: 16.3 KD/SC (ref 4.5–11)
CLOT STRENGTH P HPASE BLD TEG: 19.5 KD/SC (ref 4.5–11)
CLOT STRENGTH P HPASE BLD TEG: 19.9 KD/SC (ref 4.5–11)
CLOT STRENGTH P HPASE BLD TEG: 29.2 KD/SC (ref 4.5–11)
CLOT STRENGTH P HPASE BLD TEG: 9.8 KD/SC (ref 4.5–11)
CODING SYSTEM: NORMAL
COHGB MFR BLD: 100 % (ref 92–100)
COLOR UR AUTO: ABNORMAL
COLOR UR AUTO: ABNORMAL
CORTIS SERPL-MCNC: 35.7 UG/DL
CORTIS SERPL-MCNC: 43.7 UG/DL
CPB POCT: NO
CPB POCT: NO
CREAT SERPL-MCNC: 0.86 MG/DL (ref 0.67–1.17)
CREAT SERPL-MCNC: 0.86 MG/DL (ref 0.67–1.17)
CREAT SERPL-MCNC: 0.89 MG/DL (ref 0.67–1.17)
CREAT SERPL-MCNC: 0.93 MG/DL (ref 0.67–1.17)
CREAT SERPL-MCNC: 0.94 MG/DL (ref 0.67–1.17)
CREAT SERPL-MCNC: 0.95 MG/DL (ref 0.67–1.17)
CREAT SERPL-MCNC: 0.95 MG/DL (ref 0.67–1.17)
CREAT SERPL-MCNC: 0.96 MG/DL (ref 0.67–1.17)
CREAT SERPL-MCNC: 0.98 MG/DL (ref 0.67–1.17)
CREAT SERPL-MCNC: 0.98 MG/DL (ref 0.67–1.17)
CREAT SERPL-MCNC: 1 MG/DL (ref 0.67–1.17)
CREAT SERPL-MCNC: 1.03 MG/DL (ref 0.67–1.17)
CREAT SERPL-MCNC: 1.04 MG/DL (ref 0.67–1.17)
CREAT SERPL-MCNC: 1.05 MG/DL (ref 0.67–1.17)
CREAT SERPL-MCNC: 1.06 MG/DL (ref 0.67–1.17)
CREAT SERPL-MCNC: 1.07 MG/DL (ref 0.67–1.17)
CREAT SERPL-MCNC: 1.08 MG/DL (ref 0.67–1.17)
CREAT SERPL-MCNC: 1.1 MG/DL (ref 0.67–1.17)
CREAT SERPL-MCNC: 1.11 MG/DL (ref 0.67–1.17)
CREAT SERPL-MCNC: 1.11 MG/DL (ref 0.67–1.17)
CREAT SERPL-MCNC: 1.12 MG/DL (ref 0.67–1.17)
CREAT SERPL-MCNC: 1.13 MG/DL (ref 0.67–1.17)
CREAT SERPL-MCNC: 1.14 MG/DL (ref 0.67–1.17)
CREAT SERPL-MCNC: 1.15 MG/DL (ref 0.67–1.17)
CREAT SERPL-MCNC: 1.15 MG/DL (ref 0.67–1.17)
CREAT SERPL-MCNC: 1.16 MG/DL (ref 0.67–1.17)
CREAT SERPL-MCNC: 1.17 MG/DL (ref 0.67–1.17)
CREAT SERPL-MCNC: 1.18 MG/DL (ref 0.67–1.17)
CREAT SERPL-MCNC: 1.18 MG/DL (ref 0.67–1.17)
CREAT SERPL-MCNC: 1.2 MG/DL (ref 0.67–1.17)
CREAT SERPL-MCNC: 1.22 MG/DL (ref 0.67–1.17)
CREAT SERPL-MCNC: 1.25 MG/DL (ref 0.67–1.17)
CREAT SERPL-MCNC: 1.26 MG/DL (ref 0.67–1.17)
CREAT SERPL-MCNC: 1.29 MG/DL (ref 0.67–1.17)
CREAT SERPL-MCNC: 1.35 MG/DL (ref 0.67–1.17)
CREAT SERPL-MCNC: 1.38 MG/DL (ref 0.67–1.17)
CROSSMATCH: NORMAL
CRP SERPL-MCNC: 125 MG/L
CRP SERPL-MCNC: 141 MG/L
CRP SERPL-MCNC: 141 MG/L
CRP SERPL-MCNC: 156 MG/L
CRP SERPL-MCNC: 242 MG/L
CRP SERPL-MCNC: 248 MG/L
CRP SERPL-MCNC: 285 MG/L
CRP SERPL-MCNC: 293 MG/L
CRP SERPL-MCNC: 306 MG/L
D DIMER PPP FEU-MCNC: 1.93 UG/ML FEU (ref 0–0.5)
D DIMER PPP FEU-MCNC: 1.99 UG/ML FEU (ref 0–0.5)
D DIMER PPP FEU-MCNC: 17.76 UG/ML FEU (ref 0–0.5)
D DIMER PPP FEU-MCNC: 19.69 UG/ML FEU (ref 0–0.5)
D DIMER PPP FEU-MCNC: 2.31 UG/ML FEU (ref 0–0.5)
D DIMER PPP FEU-MCNC: 2.5 UG/ML FEU (ref 0–0.5)
D DIMER PPP FEU-MCNC: 2.65 UG/ML FEU (ref 0–0.5)
D DIMER PPP FEU-MCNC: 2.81 UG/ML FEU (ref 0–0.5)
D DIMER PPP FEU-MCNC: 2.94 UG/ML FEU (ref 0–0.5)
D DIMER PPP FEU-MCNC: 4.41 UG/ML FEU (ref 0–0.5)
D DIMER PPP FEU-MCNC: 6.26 UG/ML FEU (ref 0–0.5)
D DIMER PPP FEU-MCNC: 8.23 UG/ML FEU (ref 0–0.5)
D DIMER PPP FEU-MCNC: >20 UG/ML FEU (ref 0–0.5)
D DIMER PPP FEU-MCNC: >20 UG/ML FEU (ref 0–0.5)
DECLARED MEDICATIONS: ABNORMAL
DEPRECATED HCO3 PLAS-SCNC: 19 MMOL/L (ref 22–29)
DEPRECATED HCO3 PLAS-SCNC: 19 MMOL/L (ref 22–29)
DEPRECATED HCO3 PLAS-SCNC: 20 MMOL/L (ref 22–29)
DEPRECATED HCO3 PLAS-SCNC: 20 MMOL/L (ref 22–29)
DEPRECATED HCO3 PLAS-SCNC: 21 MMOL/L (ref 22–29)
DEPRECATED HCO3 PLAS-SCNC: 22 MMOL/L (ref 22–29)
DEPRECATED HCO3 PLAS-SCNC: 22 MMOL/L (ref 22–29)
DEPRECATED HCO3 PLAS-SCNC: 23 MMOL/L (ref 22–29)
DEPRECATED HCO3 PLAS-SCNC: 24 MMOL/L (ref 22–29)
DEPRECATED HCO3 PLAS-SCNC: 25 MMOL/L (ref 22–29)
DEPRECATED HCO3 PLAS-SCNC: 26 MMOL/L (ref 22–29)
DEPRECATED HCO3 PLAS-SCNC: 27 MMOL/L (ref 22–29)
DEPRECATED HCO3 PLAS-SCNC: 28 MMOL/L (ref 22–29)
DEPRECATED HCO3 PLAS-SCNC: 29 MMOL/L (ref 22–29)
DEPRECATED HCO3 PLAS-SCNC: 30 MMOL/L (ref 22–29)
DEPRECATED HCO3 PLAS-SCNC: 31 MMOL/L (ref 22–29)
DEPRECATED HCO3 PLAS-SCNC: 32 MMOL/L (ref 22–29)
DEPRECATED HCO3 PLAS-SCNC: 32 MMOL/L (ref 22–29)
DEPRECATED HCO3 PLAS-SCNC: 33 MMOL/L (ref 22–29)
DEPRECATED HCO3 PLAS-SCNC: 34 MMOL/L (ref 22–29)
DESALKYLFLURAZ SERPL CFM-MCNC: NEGATIVE NG/ML
DESALKYLFLURAZ SERPL CFM-MCNC: NEGATIVE NG/ML
DIASTOLIC BLOOD PRESSURE - MUSE: NORMAL MMHG
DIAZEPAM SERPL-MCNC: NEGATIVE NG/ML
DIAZEPAM SERPL-MCNC: NEGATIVE NG/ML
DRUGS FLD: ABNORMAL
EOSINOPHIL # BLD AUTO: 0 10E3/UL (ref 0–0.7)
EOSINOPHIL # BLD AUTO: 0 10E3/UL (ref 0–0.7)
EOSINOPHIL NFR BLD AUTO: 0 %
EOSINOPHIL NFR BLD AUTO: 0 %
ERYTHROCYTE [DISTWIDTH] IN BLOOD BY AUTOMATED COUNT: 13.1 % (ref 10–15)
ERYTHROCYTE [DISTWIDTH] IN BLOOD BY AUTOMATED COUNT: 13.2 % (ref 10–15)
ERYTHROCYTE [DISTWIDTH] IN BLOOD BY AUTOMATED COUNT: 13.4 % (ref 10–15)
ERYTHROCYTE [DISTWIDTH] IN BLOOD BY AUTOMATED COUNT: 13.5 % (ref 10–15)
ERYTHROCYTE [DISTWIDTH] IN BLOOD BY AUTOMATED COUNT: 13.5 % (ref 10–15)
ERYTHROCYTE [DISTWIDTH] IN BLOOD BY AUTOMATED COUNT: 13.6 % (ref 10–15)
ERYTHROCYTE [DISTWIDTH] IN BLOOD BY AUTOMATED COUNT: 13.7 % (ref 10–15)
ERYTHROCYTE [DISTWIDTH] IN BLOOD BY AUTOMATED COUNT: 13.8 % (ref 10–15)
ERYTHROCYTE [DISTWIDTH] IN BLOOD BY AUTOMATED COUNT: 13.9 % (ref 10–15)
ERYTHROCYTE [DISTWIDTH] IN BLOOD BY AUTOMATED COUNT: 14 % (ref 10–15)
ERYTHROCYTE [DISTWIDTH] IN BLOOD BY AUTOMATED COUNT: 14 % (ref 10–15)
ERYTHROCYTE [DISTWIDTH] IN BLOOD BY AUTOMATED COUNT: 14.1 % (ref 10–15)
ERYTHROCYTE [DISTWIDTH] IN BLOOD BY AUTOMATED COUNT: 14.2 % (ref 10–15)
ERYTHROCYTE [DISTWIDTH] IN BLOOD BY AUTOMATED COUNT: 14.4 % (ref 10–15)
ERYTHROCYTE [DISTWIDTH] IN BLOOD BY AUTOMATED COUNT: 14.6 % (ref 10–15)
ERYTHROCYTE [DISTWIDTH] IN BLOOD BY AUTOMATED COUNT: 14.6 % (ref 10–15)
ERYTHROCYTE [DISTWIDTH] IN BLOOD BY AUTOMATED COUNT: 15.3 % (ref 10–15)
ERYTHROCYTE [DISTWIDTH] IN BLOOD BY AUTOMATED COUNT: 15.8 % (ref 10–15)
ERYTHROCYTE [DISTWIDTH] IN BLOOD BY AUTOMATED COUNT: 15.9 % (ref 10–15)
ERYTHROCYTE [DISTWIDTH] IN BLOOD BY AUTOMATED COUNT: 16 % (ref 10–15)
ERYTHROCYTE [DISTWIDTH] IN BLOOD BY AUTOMATED COUNT: 16.2 % (ref 10–15)
ERYTHROCYTE [DISTWIDTH] IN BLOOD BY AUTOMATED COUNT: 16.4 % (ref 10–15)
ERYTHROCYTE [DISTWIDTH] IN BLOOD BY AUTOMATED COUNT: 16.5 % (ref 10–15)
ERYTHROCYTE [DISTWIDTH] IN BLOOD BY AUTOMATED COUNT: 16.6 % (ref 10–15)
ERYTHROCYTE [DISTWIDTH] IN BLOOD BY AUTOMATED COUNT: 16.9 % (ref 10–15)
ERYTHROCYTE [DISTWIDTH] IN BLOOD BY AUTOMATED COUNT: 17 % (ref 10–15)
ERYTHROCYTE [SEDIMENTATION RATE] IN BLOOD BY WESTERGREN METHOD: 102 MM/HR (ref 0–20)
ERYTHROCYTE [SEDIMENTATION RATE] IN BLOOD BY WESTERGREN METHOD: 104 MM/HR (ref 0–20)
ERYTHROCYTE [SEDIMENTATION RATE] IN BLOOD BY WESTERGREN METHOD: 107 MM/HR (ref 0–20)
ERYTHROCYTE [SEDIMENTATION RATE] IN BLOOD BY WESTERGREN METHOD: 121 MM/HR (ref 0–20)
ERYTHROCYTE [SEDIMENTATION RATE] IN BLOOD BY WESTERGREN METHOD: 40 MM/HR (ref 0–20)
ERYTHROCYTE [SEDIMENTATION RATE] IN BLOOD BY WESTERGREN METHOD: 41 MM/HR (ref 0–20)
ERYTHROCYTE [SEDIMENTATION RATE] IN BLOOD BY WESTERGREN METHOD: 47 MM/HR (ref 0–20)
ERYTHROCYTE [SEDIMENTATION RATE] IN BLOOD BY WESTERGREN METHOD: 63 MM/HR (ref 0–20)
ERYTHROCYTE [SEDIMENTATION RATE] IN BLOOD BY WESTERGREN METHOD: 87 MM/HR (ref 0–20)
ETHANOL BLD-MCNC: NEGATIVE GM/DL
ETHANOL UR QL SCN: ABNORMAL
FENTANYL BLD CFM-MCNC: 0.9 NG/ML
FENTANYL BLD CFM-MCNC: 1.3 NG/ML
FENTANYL IA: ABNORMAL NG/ML
FENTANYL SPEC QL: POSITIVE
FENTANYL SPEC QL: POSITIVE
FIBRINOGEN PPP-MCNC: 540 MG/DL (ref 170–490)
FIBRINOGEN PPP-MCNC: 581 MG/DL (ref 170–490)
FIBRINOGEN PPP-MCNC: 599 MG/DL (ref 170–490)
FIBRINOGEN PPP-MCNC: 623 MG/DL (ref 170–490)
FIBRINOGEN PPP-MCNC: 624 MG/DL (ref 170–490)
FIBRINOGEN PPP-MCNC: 628 MG/DL (ref 170–490)
FIBRINOGEN PPP-MCNC: 640 MG/DL (ref 170–490)
FIBRINOGEN PPP-MCNC: 775 MG/DL (ref 170–490)
FIBRINOGEN PPP-MCNC: 804 MG/DL (ref 170–490)
FIBRINOGEN PPP-MCNC: 806 MG/DL (ref 170–490)
FIBRINOGEN PPP-MCNC: 845 MG/DL (ref 170–490)
FIBRINOGEN PPP-MCNC: 854 MG/DL (ref 170–490)
FIBRINOGEN PPP-MCNC: 869 MG/DL (ref 170–490)
FIBRINOGEN PPP-MCNC: 897 MG/DL (ref 170–490)
FIBRINOGEN PPP-MCNC: 941 MG/DL (ref 170–490)
FLURAZEPAM SPEC-MCNC: NEGATIVE NG/ML
FLURAZEPAM SPEC-MCNC: NEGATIVE NG/ML
GABAPENTIN IA: NEGATIVE UG/ML
GFR SERPL CREATININE-BSD FRML MDRD: 59 ML/MIN/1.73M2
GFR SERPL CREATININE-BSD FRML MDRD: 60 ML/MIN/1.73M2
GFR SERPL CREATININE-BSD FRML MDRD: 63 ML/MIN/1.73M2
GFR SERPL CREATININE-BSD FRML MDRD: 63 ML/MIN/1.73M2
GFR SERPL CREATININE-BSD FRML MDRD: 65 ML/MIN/1.73M2
GFR SERPL CREATININE-BSD FRML MDRD: 66 ML/MIN/1.73M2
GFR SERPL CREATININE-BSD FRML MDRD: 67 ML/MIN/1.73M2
GFR SERPL CREATININE-BSD FRML MDRD: 68 ML/MIN/1.73M2
GFR SERPL CREATININE-BSD FRML MDRD: 69 ML/MIN/1.73M2
GFR SERPL CREATININE-BSD FRML MDRD: 71 ML/MIN/1.73M2
GFR SERPL CREATININE-BSD FRML MDRD: 72 ML/MIN/1.73M2
GFR SERPL CREATININE-BSD FRML MDRD: 73 ML/MIN/1.73M2
GFR SERPL CREATININE-BSD FRML MDRD: 73 ML/MIN/1.73M2
GFR SERPL CREATININE-BSD FRML MDRD: 74 ML/MIN/1.73M2
GFR SERPL CREATININE-BSD FRML MDRD: 75 ML/MIN/1.73M2
GFR SERPL CREATININE-BSD FRML MDRD: 76 ML/MIN/1.73M2
GFR SERPL CREATININE-BSD FRML MDRD: 76 ML/MIN/1.73M2
GFR SERPL CREATININE-BSD FRML MDRD: 77 ML/MIN/1.73M2
GFR SERPL CREATININE-BSD FRML MDRD: 79 ML/MIN/1.73M2
GFR SERPL CREATININE-BSD FRML MDRD: 80 ML/MIN/1.73M2
GFR SERPL CREATININE-BSD FRML MDRD: 81 ML/MIN/1.73M2
GFR SERPL CREATININE-BSD FRML MDRD: 82 ML/MIN/1.73M2
GFR SERPL CREATININE-BSD FRML MDRD: 83 ML/MIN/1.73M2
GFR SERPL CREATININE-BSD FRML MDRD: 86 ML/MIN/1.73M2
GFR SERPL CREATININE-BSD FRML MDRD: 88 ML/MIN/1.73M2
GFR SERPL CREATININE-BSD FRML MDRD: 88 ML/MIN/1.73M2
GFR SERPL CREATININE-BSD FRML MDRD: 90 ML/MIN/1.73M2
GFR SERPL CREATININE-BSD FRML MDRD: >90 ML/MIN/1.73M2
GLUCOSE BLD-MCNC: 102 MG/DL (ref 70–99)
GLUCOSE BLD-MCNC: 111 MG/DL (ref 70–99)
GLUCOSE BLD-MCNC: 116 MG/DL (ref 70–99)
GLUCOSE BLD-MCNC: 117 MG/DL (ref 70–99)
GLUCOSE BLD-MCNC: 119 MG/DL (ref 70–99)
GLUCOSE BLD-MCNC: 121 MG/DL (ref 70–99)
GLUCOSE BLD-MCNC: 122 MG/DL (ref 70–99)
GLUCOSE BLD-MCNC: 127 MG/DL (ref 70–99)
GLUCOSE BLD-MCNC: 128 MG/DL (ref 70–99)
GLUCOSE BLD-MCNC: 128 MG/DL (ref 70–99)
GLUCOSE BLD-MCNC: 129 MG/DL (ref 70–99)
GLUCOSE BLD-MCNC: 132 MG/DL (ref 70–99)
GLUCOSE BLD-MCNC: 133 MG/DL (ref 70–99)
GLUCOSE BLD-MCNC: 134 MG/DL (ref 70–99)
GLUCOSE BLD-MCNC: 138 MG/DL (ref 70–99)
GLUCOSE BLD-MCNC: 140 MG/DL (ref 70–99)
GLUCOSE BLD-MCNC: 143 MG/DL (ref 70–99)
GLUCOSE BLD-MCNC: 146 MG/DL (ref 70–99)
GLUCOSE BLD-MCNC: 147 MG/DL (ref 70–99)
GLUCOSE BLD-MCNC: 148 MG/DL (ref 70–99)
GLUCOSE BLD-MCNC: 152 MG/DL (ref 70–99)
GLUCOSE BLD-MCNC: 160 MG/DL (ref 70–99)
GLUCOSE BLD-MCNC: 160 MG/DL (ref 70–99)
GLUCOSE BLD-MCNC: 163 MG/DL (ref 70–99)
GLUCOSE BLD-MCNC: 165 MG/DL (ref 70–99)
GLUCOSE BLD-MCNC: 165 MG/DL (ref 70–99)
GLUCOSE BLD-MCNC: 172 MG/DL (ref 70–99)
GLUCOSE BLD-MCNC: 176 MG/DL (ref 70–99)
GLUCOSE BLD-MCNC: 177 MG/DL (ref 70–99)
GLUCOSE BLD-MCNC: 178 MG/DL (ref 70–99)
GLUCOSE BLD-MCNC: 179 MG/DL (ref 70–99)
GLUCOSE BLD-MCNC: 183 MG/DL (ref 70–99)
GLUCOSE BLD-MCNC: 184 MG/DL (ref 70–99)
GLUCOSE BLD-MCNC: 187 MG/DL (ref 70–99)
GLUCOSE BLD-MCNC: 188 MG/DL (ref 70–99)
GLUCOSE BLD-MCNC: 195 MG/DL (ref 70–99)
GLUCOSE BLD-MCNC: 231 MG/DL (ref 70–99)
GLUCOSE BLD-MCNC: 288 MG/DL (ref 70–99)
GLUCOSE BLD-MCNC: 305 MG/DL (ref 70–99)
GLUCOSE BLD-MCNC: 308 MG/DL (ref 70–99)
GLUCOSE BLD-MCNC: 320 MG/DL (ref 70–99)
GLUCOSE BLD-MCNC: 338 MG/DL (ref 70–99)
GLUCOSE BLD-MCNC: 79 MG/DL (ref 70–99)
GLUCOSE BLDC GLUCOMTR-MCNC: 100 MG/DL (ref 70–99)
GLUCOSE BLDC GLUCOMTR-MCNC: 100 MG/DL (ref 70–99)
GLUCOSE BLDC GLUCOMTR-MCNC: 101 MG/DL (ref 70–99)
GLUCOSE BLDC GLUCOMTR-MCNC: 102 MG/DL (ref 70–99)
GLUCOSE BLDC GLUCOMTR-MCNC: 102 MG/DL (ref 70–99)
GLUCOSE BLDC GLUCOMTR-MCNC: 103 MG/DL (ref 70–99)
GLUCOSE BLDC GLUCOMTR-MCNC: 104 MG/DL (ref 70–99)
GLUCOSE BLDC GLUCOMTR-MCNC: 105 MG/DL (ref 70–99)
GLUCOSE BLDC GLUCOMTR-MCNC: 106 MG/DL (ref 70–99)
GLUCOSE BLDC GLUCOMTR-MCNC: 107 MG/DL (ref 70–99)
GLUCOSE BLDC GLUCOMTR-MCNC: 108 MG/DL (ref 70–99)
GLUCOSE BLDC GLUCOMTR-MCNC: 108 MG/DL (ref 70–99)
GLUCOSE BLDC GLUCOMTR-MCNC: 109 MG/DL (ref 70–99)
GLUCOSE BLDC GLUCOMTR-MCNC: 110 MG/DL (ref 70–99)
GLUCOSE BLDC GLUCOMTR-MCNC: 111 MG/DL (ref 70–99)
GLUCOSE BLDC GLUCOMTR-MCNC: 111 MG/DL (ref 70–99)
GLUCOSE BLDC GLUCOMTR-MCNC: 112 MG/DL (ref 70–99)
GLUCOSE BLDC GLUCOMTR-MCNC: 112 MG/DL (ref 70–99)
GLUCOSE BLDC GLUCOMTR-MCNC: 113 MG/DL (ref 70–99)
GLUCOSE BLDC GLUCOMTR-MCNC: 114 MG/DL (ref 70–99)
GLUCOSE BLDC GLUCOMTR-MCNC: 114 MG/DL (ref 70–99)
GLUCOSE BLDC GLUCOMTR-MCNC: 115 MG/DL (ref 70–99)
GLUCOSE BLDC GLUCOMTR-MCNC: 116 MG/DL (ref 70–99)
GLUCOSE BLDC GLUCOMTR-MCNC: 117 MG/DL (ref 70–99)
GLUCOSE BLDC GLUCOMTR-MCNC: 118 MG/DL (ref 70–99)
GLUCOSE BLDC GLUCOMTR-MCNC: 119 MG/DL (ref 70–99)
GLUCOSE BLDC GLUCOMTR-MCNC: 120 MG/DL (ref 70–99)
GLUCOSE BLDC GLUCOMTR-MCNC: 121 MG/DL (ref 70–99)
GLUCOSE BLDC GLUCOMTR-MCNC: 122 MG/DL (ref 70–99)
GLUCOSE BLDC GLUCOMTR-MCNC: 123 MG/DL (ref 70–99)
GLUCOSE BLDC GLUCOMTR-MCNC: 124 MG/DL (ref 70–99)
GLUCOSE BLDC GLUCOMTR-MCNC: 125 MG/DL (ref 70–99)
GLUCOSE BLDC GLUCOMTR-MCNC: 126 MG/DL (ref 70–99)
GLUCOSE BLDC GLUCOMTR-MCNC: 127 MG/DL (ref 70–99)
GLUCOSE BLDC GLUCOMTR-MCNC: 128 MG/DL (ref 70–99)
GLUCOSE BLDC GLUCOMTR-MCNC: 129 MG/DL (ref 70–99)
GLUCOSE BLDC GLUCOMTR-MCNC: 130 MG/DL (ref 70–99)
GLUCOSE BLDC GLUCOMTR-MCNC: 131 MG/DL (ref 70–99)
GLUCOSE BLDC GLUCOMTR-MCNC: 132 MG/DL (ref 70–99)
GLUCOSE BLDC GLUCOMTR-MCNC: 133 MG/DL (ref 70–99)
GLUCOSE BLDC GLUCOMTR-MCNC: 134 MG/DL (ref 70–99)
GLUCOSE BLDC GLUCOMTR-MCNC: 135 MG/DL (ref 70–99)
GLUCOSE BLDC GLUCOMTR-MCNC: 136 MG/DL (ref 70–99)
GLUCOSE BLDC GLUCOMTR-MCNC: 137 MG/DL (ref 70–99)
GLUCOSE BLDC GLUCOMTR-MCNC: 138 MG/DL (ref 70–99)
GLUCOSE BLDC GLUCOMTR-MCNC: 139 MG/DL (ref 70–99)
GLUCOSE BLDC GLUCOMTR-MCNC: 140 MG/DL (ref 70–99)
GLUCOSE BLDC GLUCOMTR-MCNC: 141 MG/DL (ref 70–99)
GLUCOSE BLDC GLUCOMTR-MCNC: 142 MG/DL (ref 70–99)
GLUCOSE BLDC GLUCOMTR-MCNC: 143 MG/DL (ref 70–99)
GLUCOSE BLDC GLUCOMTR-MCNC: 144 MG/DL (ref 70–99)
GLUCOSE BLDC GLUCOMTR-MCNC: 144 MG/DL (ref 70–99)
GLUCOSE BLDC GLUCOMTR-MCNC: 145 MG/DL (ref 70–99)
GLUCOSE BLDC GLUCOMTR-MCNC: 146 MG/DL (ref 70–99)
GLUCOSE BLDC GLUCOMTR-MCNC: 147 MG/DL (ref 70–99)
GLUCOSE BLDC GLUCOMTR-MCNC: 148 MG/DL (ref 70–99)
GLUCOSE BLDC GLUCOMTR-MCNC: 149 MG/DL (ref 70–99)
GLUCOSE BLDC GLUCOMTR-MCNC: 150 MG/DL (ref 70–99)
GLUCOSE BLDC GLUCOMTR-MCNC: 151 MG/DL (ref 70–99)
GLUCOSE BLDC GLUCOMTR-MCNC: 151 MG/DL (ref 70–99)
GLUCOSE BLDC GLUCOMTR-MCNC: 152 MG/DL (ref 70–99)
GLUCOSE BLDC GLUCOMTR-MCNC: 153 MG/DL (ref 70–99)
GLUCOSE BLDC GLUCOMTR-MCNC: 154 MG/DL (ref 70–99)
GLUCOSE BLDC GLUCOMTR-MCNC: 155 MG/DL (ref 70–99)
GLUCOSE BLDC GLUCOMTR-MCNC: 156 MG/DL (ref 70–99)
GLUCOSE BLDC GLUCOMTR-MCNC: 156 MG/DL (ref 70–99)
GLUCOSE BLDC GLUCOMTR-MCNC: 157 MG/DL (ref 70–99)
GLUCOSE BLDC GLUCOMTR-MCNC: 158 MG/DL (ref 70–99)
GLUCOSE BLDC GLUCOMTR-MCNC: 159 MG/DL (ref 70–99)
GLUCOSE BLDC GLUCOMTR-MCNC: 160 MG/DL (ref 70–99)
GLUCOSE BLDC GLUCOMTR-MCNC: 161 MG/DL (ref 70–99)
GLUCOSE BLDC GLUCOMTR-MCNC: 162 MG/DL (ref 70–99)
GLUCOSE BLDC GLUCOMTR-MCNC: 163 MG/DL (ref 70–99)
GLUCOSE BLDC GLUCOMTR-MCNC: 166 MG/DL (ref 70–99)
GLUCOSE BLDC GLUCOMTR-MCNC: 167 MG/DL (ref 70–99)
GLUCOSE BLDC GLUCOMTR-MCNC: 168 MG/DL (ref 70–99)
GLUCOSE BLDC GLUCOMTR-MCNC: 169 MG/DL (ref 70–99)
GLUCOSE BLDC GLUCOMTR-MCNC: 170 MG/DL (ref 70–99)
GLUCOSE BLDC GLUCOMTR-MCNC: 171 MG/DL (ref 70–99)
GLUCOSE BLDC GLUCOMTR-MCNC: 172 MG/DL (ref 70–99)
GLUCOSE BLDC GLUCOMTR-MCNC: 172 MG/DL (ref 70–99)
GLUCOSE BLDC GLUCOMTR-MCNC: 173 MG/DL (ref 70–99)
GLUCOSE BLDC GLUCOMTR-MCNC: 174 MG/DL (ref 70–99)
GLUCOSE BLDC GLUCOMTR-MCNC: 175 MG/DL (ref 70–99)
GLUCOSE BLDC GLUCOMTR-MCNC: 176 MG/DL (ref 70–99)
GLUCOSE BLDC GLUCOMTR-MCNC: 176 MG/DL (ref 70–99)
GLUCOSE BLDC GLUCOMTR-MCNC: 178 MG/DL (ref 70–99)
GLUCOSE BLDC GLUCOMTR-MCNC: 178 MG/DL (ref 70–99)
GLUCOSE BLDC GLUCOMTR-MCNC: 179 MG/DL (ref 70–99)
GLUCOSE BLDC GLUCOMTR-MCNC: 179 MG/DL (ref 70–99)
GLUCOSE BLDC GLUCOMTR-MCNC: 180 MG/DL (ref 70–99)
GLUCOSE BLDC GLUCOMTR-MCNC: 180 MG/DL (ref 70–99)
GLUCOSE BLDC GLUCOMTR-MCNC: 184 MG/DL (ref 70–99)
GLUCOSE BLDC GLUCOMTR-MCNC: 186 MG/DL (ref 70–99)
GLUCOSE BLDC GLUCOMTR-MCNC: 188 MG/DL (ref 70–99)
GLUCOSE BLDC GLUCOMTR-MCNC: 193 MG/DL (ref 70–99)
GLUCOSE BLDC GLUCOMTR-MCNC: 197 MG/DL (ref 70–99)
GLUCOSE BLDC GLUCOMTR-MCNC: 202 MG/DL (ref 70–99)
GLUCOSE BLDC GLUCOMTR-MCNC: 202 MG/DL (ref 70–99)
GLUCOSE BLDC GLUCOMTR-MCNC: 221 MG/DL (ref 70–99)
GLUCOSE BLDC GLUCOMTR-MCNC: 233 MG/DL (ref 70–99)
GLUCOSE BLDC GLUCOMTR-MCNC: 256 MG/DL (ref 70–99)
GLUCOSE BLDC GLUCOMTR-MCNC: 267 MG/DL (ref 70–99)
GLUCOSE BLDC GLUCOMTR-MCNC: 293 MG/DL (ref 70–99)
GLUCOSE BLDC GLUCOMTR-MCNC: 295 MG/DL (ref 70–99)
GLUCOSE BLDC GLUCOMTR-MCNC: 324 MG/DL (ref 70–99)
GLUCOSE BLDC GLUCOMTR-MCNC: 77 MG/DL (ref 70–99)
GLUCOSE BLDC GLUCOMTR-MCNC: 88 MG/DL (ref 70–99)
GLUCOSE BLDC GLUCOMTR-MCNC: 92 MG/DL (ref 70–99)
GLUCOSE BLDC GLUCOMTR-MCNC: 96 MG/DL (ref 70–99)
GLUCOSE SERPL-MCNC: 101 MG/DL (ref 70–99)
GLUCOSE SERPL-MCNC: 104 MG/DL (ref 70–99)
GLUCOSE SERPL-MCNC: 106 MG/DL (ref 70–99)
GLUCOSE SERPL-MCNC: 108 MG/DL (ref 70–99)
GLUCOSE SERPL-MCNC: 111 MG/DL (ref 70–99)
GLUCOSE SERPL-MCNC: 111 MG/DL (ref 70–99)
GLUCOSE SERPL-MCNC: 113 MG/DL (ref 70–99)
GLUCOSE SERPL-MCNC: 117 MG/DL (ref 70–99)
GLUCOSE SERPL-MCNC: 118 MG/DL (ref 70–99)
GLUCOSE SERPL-MCNC: 118 MG/DL (ref 70–99)
GLUCOSE SERPL-MCNC: 120 MG/DL (ref 70–99)
GLUCOSE SERPL-MCNC: 122 MG/DL (ref 70–99)
GLUCOSE SERPL-MCNC: 124 MG/DL (ref 70–99)
GLUCOSE SERPL-MCNC: 125 MG/DL (ref 70–99)
GLUCOSE SERPL-MCNC: 127 MG/DL (ref 70–99)
GLUCOSE SERPL-MCNC: 129 MG/DL (ref 70–99)
GLUCOSE SERPL-MCNC: 130 MG/DL (ref 70–99)
GLUCOSE SERPL-MCNC: 130 MG/DL (ref 70–99)
GLUCOSE SERPL-MCNC: 131 MG/DL (ref 70–99)
GLUCOSE SERPL-MCNC: 131 MG/DL (ref 70–99)
GLUCOSE SERPL-MCNC: 136 MG/DL (ref 70–99)
GLUCOSE SERPL-MCNC: 137 MG/DL (ref 70–99)
GLUCOSE SERPL-MCNC: 141 MG/DL (ref 70–99)
GLUCOSE SERPL-MCNC: 144 MG/DL (ref 70–99)
GLUCOSE SERPL-MCNC: 146 MG/DL (ref 70–99)
GLUCOSE SERPL-MCNC: 146 MG/DL (ref 70–99)
GLUCOSE SERPL-MCNC: 147 MG/DL (ref 70–99)
GLUCOSE SERPL-MCNC: 153 MG/DL (ref 70–99)
GLUCOSE SERPL-MCNC: 162 MG/DL (ref 70–99)
GLUCOSE SERPL-MCNC: 164 MG/DL (ref 70–99)
GLUCOSE SERPL-MCNC: 165 MG/DL (ref 70–99)
GLUCOSE SERPL-MCNC: 168 MG/DL (ref 70–99)
GLUCOSE SERPL-MCNC: 169 MG/DL (ref 70–99)
GLUCOSE SERPL-MCNC: 171 MG/DL (ref 70–99)
GLUCOSE SERPL-MCNC: 171 MG/DL (ref 70–99)
GLUCOSE SERPL-MCNC: 177 MG/DL (ref 70–99)
GLUCOSE SERPL-MCNC: 180 MG/DL (ref 70–99)
GLUCOSE SERPL-MCNC: 180 MG/DL (ref 70–99)
GLUCOSE SERPL-MCNC: 183 MG/DL (ref 70–99)
GLUCOSE SERPL-MCNC: 185 MG/DL (ref 70–99)
GLUCOSE SERPL-MCNC: 188 MG/DL (ref 70–99)
GLUCOSE SERPL-MCNC: 189 MG/DL (ref 70–99)
GLUCOSE SERPL-MCNC: 191 MG/DL (ref 70–99)
GLUCOSE SERPL-MCNC: 232 MG/DL (ref 70–99)
GLUCOSE SERPL-MCNC: 294 MG/DL (ref 70–99)
GLUCOSE SERPL-MCNC: 80 MG/DL (ref 70–99)
GLUCOSE UR STRIP-MCNC: 1000 MG/DL
GLUCOSE UR STRIP-MCNC: NEGATIVE MG/DL
GRAM STAIN RESULT: NORMAL
HBA1C MFR BLD: 7.5 %
HCO3 BLD-SCNC: 19 MMOL/L (ref 21–28)
HCO3 BLD-SCNC: 20 MMOL/L (ref 21–28)
HCO3 BLD-SCNC: 21 MMOL/L (ref 21–28)
HCO3 BLD-SCNC: 22 MMOL/L (ref 21–28)
HCO3 BLD-SCNC: 23 MMOL/L (ref 21–28)
HCO3 BLD-SCNC: 24 MMOL/L (ref 21–28)
HCO3 BLD-SCNC: 25 MMOL/L (ref 21–28)
HCO3 BLD-SCNC: 26 MMOL/L (ref 21–28)
HCO3 BLD-SCNC: 27 MMOL/L (ref 21–28)
HCO3 BLD-SCNC: 28 MMOL/L (ref 21–28)
HCO3 BLD-SCNC: 28 MMOL/L (ref 21–28)
HCO3 BLD-SCNC: 29 MMOL/L (ref 21–28)
HCO3 BLD-SCNC: 30 MMOL/L (ref 21–28)
HCO3 BLD-SCNC: 31 MMOL/L (ref 21–28)
HCO3 BLD-SCNC: 32 MMOL/L (ref 21–28)
HCO3 BLD-SCNC: 33 MMOL/L (ref 21–28)
HCO3 BLDA-SCNC: 18 MMOL/L (ref 21–28)
HCO3 BLDA-SCNC: 19 MMOL/L (ref 21–28)
HCO3 BLDA-SCNC: 20 MMOL/L (ref 21–28)
HCO3 BLDA-SCNC: 21 MMOL/L (ref 21–28)
HCO3 BLDA-SCNC: 23 MMOL/L (ref 21–28)
HCO3 BLDA-SCNC: 26 MMOL/L (ref 21–28)
HCO3 BLDA-SCNC: 28 MMOL/L (ref 21–28)
HCO3 BLDA-SCNC: 29 MMOL/L (ref 21–28)
HCO3 BLDA-SCNC: 30 MMOL/L (ref 21–28)
HCO3 BLDA-SCNC: 31 MMOL/L (ref 21–28)
HCO3 BLDA-SCNC: 32 MMOL/L (ref 21–28)
HCO3 BLDA-SCNC: 32 MMOL/L (ref 21–28)
HCO3 BLDA-SCNC: 33 MMOL/L (ref 21–28)
HCO3 BLDA-SCNC: 33 MMOL/L (ref 21–28)
HCO3 BLDA-SCNC: 34 MMOL/L (ref 21–28)
HCO3 BLDA-SCNC: 34 MMOL/L (ref 21–28)
HCO3 BLDA-SCNC: 35 MMOL/L (ref 21–28)
HCO3 BLDV-SCNC: 16 MMOL/L (ref 21–28)
HCO3 BLDV-SCNC: 17 MMOL/L (ref 21–28)
HCO3 BLDV-SCNC: 22 MMOL/L (ref 21–28)
HCO3 BLDV-SCNC: 23 MMOL/L (ref 21–28)
HCO3 BLDV-SCNC: 24 MMOL/L (ref 21–28)
HCO3 BLDV-SCNC: 27 MMOL/L (ref 21–28)
HCO3 BLDV-SCNC: 29 MMOL/L (ref 21–28)
HCO3 BLDV-SCNC: 30 MMOL/L (ref 21–28)
HCO3 BLDV-SCNC: 31 MMOL/L (ref 21–28)
HCO3 BLDV-SCNC: 32 MMOL/L (ref 21–28)
HCO3 BLDV-SCNC: 33 MMOL/L (ref 21–28)
HCO3 BLDV-SCNC: 34 MMOL/L (ref 21–28)
HCO3 BLDV-SCNC: 34 MMOL/L (ref 21–28)
HCO3 BLDV-SCNC: 35 MMOL/L (ref 21–28)
HCT VFR BLD AUTO: 21.2 % (ref 40–53)
HCT VFR BLD AUTO: 22.7 % (ref 40–53)
HCT VFR BLD AUTO: 22.7 % (ref 40–53)
HCT VFR BLD AUTO: 22.8 % (ref 40–53)
HCT VFR BLD AUTO: 23 % (ref 40–53)
HCT VFR BLD AUTO: 23.2 % (ref 40–53)
HCT VFR BLD AUTO: 23.2 % (ref 40–53)
HCT VFR BLD AUTO: 23.3 % (ref 40–53)
HCT VFR BLD AUTO: 23.4 % (ref 40–53)
HCT VFR BLD AUTO: 23.6 % (ref 40–53)
HCT VFR BLD AUTO: 23.7 % (ref 40–53)
HCT VFR BLD AUTO: 23.7 % (ref 40–53)
HCT VFR BLD AUTO: 23.8 % (ref 40–53)
HCT VFR BLD AUTO: 23.9 % (ref 40–53)
HCT VFR BLD AUTO: 24 % (ref 40–53)
HCT VFR BLD AUTO: 24 % (ref 40–53)
HCT VFR BLD AUTO: 24.1 % (ref 40–53)
HCT VFR BLD AUTO: 24.2 % (ref 40–53)
HCT VFR BLD AUTO: 24.2 % (ref 40–53)
HCT VFR BLD AUTO: 24.3 % (ref 40–53)
HCT VFR BLD AUTO: 24.3 % (ref 40–53)
HCT VFR BLD AUTO: 24.4 % (ref 40–53)
HCT VFR BLD AUTO: 24.4 % (ref 40–53)
HCT VFR BLD AUTO: 25.1 % (ref 40–53)
HCT VFR BLD AUTO: 25.1 % (ref 40–53)
HCT VFR BLD AUTO: 25.6 % (ref 40–53)
HCT VFR BLD AUTO: 25.9 % (ref 40–53)
HCT VFR BLD AUTO: 25.9 % (ref 40–53)
HCT VFR BLD AUTO: 26.1 % (ref 40–53)
HCT VFR BLD AUTO: 26.6 % (ref 40–53)
HCT VFR BLD AUTO: 27.1 % (ref 40–53)
HCT VFR BLD AUTO: 27.5 % (ref 40–53)
HCT VFR BLD AUTO: 28.1 % (ref 40–53)
HCT VFR BLD AUTO: 29.7 % (ref 40–53)
HCT VFR BLD AUTO: 30.1 % (ref 40–53)
HCT VFR BLD AUTO: 30.3 % (ref 40–53)
HCT VFR BLD AUTO: 31.7 % (ref 40–53)
HCT VFR BLD AUTO: 32 % (ref 40–53)
HCT VFR BLD AUTO: 34 % (ref 40–53)
HCT VFR BLD AUTO: 37.3 % (ref 40–53)
HCT VFR BLD AUTO: 37.4 % (ref 40–53)
HCT VFR BLD AUTO: 37.5 % (ref 40–53)
HCT VFR BLD AUTO: 38.1 % (ref 40–53)
HCT VFR BLD AUTO: 38.8 % (ref 40–53)
HCT VFR BLD CALC: 35 % (ref 40–53)
HCT VFR BLD CALC: 38 % (ref 40–53)
HDLC SERPL-MCNC: 40 MG/DL
HGB BLD-MCNC: 10.1 G/DL (ref 13.3–17.7)
HGB BLD-MCNC: 10.3 G/DL (ref 13.3–17.7)
HGB BLD-MCNC: 10.6 G/DL (ref 13.3–17.7)
HGB BLD-MCNC: 10.7 G/DL (ref 13.3–17.7)
HGB BLD-MCNC: 11.2 G/DL (ref 13.3–17.7)
HGB BLD-MCNC: 11.2 G/DL (ref 13.3–17.7)
HGB BLD-MCNC: 11.9 G/DL (ref 13.3–17.7)
HGB BLD-MCNC: 12.1 G/DL (ref 13.3–17.7)
HGB BLD-MCNC: 12.2 G/DL (ref 13.3–17.7)
HGB BLD-MCNC: 12.4 G/DL (ref 13.3–17.7)
HGB BLD-MCNC: 12.5 G/DL (ref 13.3–17.7)
HGB BLD-MCNC: 12.8 G/DL (ref 13.3–17.7)
HGB BLD-MCNC: 12.9 G/DL (ref 13.3–17.7)
HGB BLD-MCNC: 13.5 G/DL (ref 13.3–17.7)
HGB BLD-MCNC: 13.6 G/DL (ref 13.3–17.7)
HGB BLD-MCNC: 13.7 G/DL (ref 13.3–17.7)
HGB BLD-MCNC: 14.1 G/DL (ref 13.3–17.7)
HGB BLD-MCNC: 6.8 G/DL (ref 13.3–17.7)
HGB BLD-MCNC: 6.9 G/DL (ref 13.3–17.7)
HGB BLD-MCNC: 7 G/DL (ref 13.3–17.7)
HGB BLD-MCNC: 7.2 G/DL (ref 13.3–17.7)
HGB BLD-MCNC: 7.3 G/DL (ref 13.3–17.7)
HGB BLD-MCNC: 7.3 G/DL (ref 13.3–17.7)
HGB BLD-MCNC: 7.4 G/DL (ref 13.3–17.7)
HGB BLD-MCNC: 7.5 G/DL (ref 13.3–17.7)
HGB BLD-MCNC: 7.6 G/DL (ref 13.3–17.7)
HGB BLD-MCNC: 7.7 G/DL (ref 13.3–17.7)
HGB BLD-MCNC: 7.8 G/DL (ref 13.3–17.7)
HGB BLD-MCNC: 7.8 G/DL (ref 13.3–17.7)
HGB BLD-MCNC: 7.9 G/DL (ref 13.3–17.7)
HGB BLD-MCNC: 8.1 G/DL (ref 13.3–17.7)
HGB BLD-MCNC: 8.2 G/DL (ref 13.3–17.7)
HGB BLD-MCNC: 8.3 G/DL (ref 13.3–17.7)
HGB BLD-MCNC: 8.3 G/DL (ref 13.3–17.7)
HGB BLD-MCNC: 8.4 G/DL (ref 13.3–17.7)
HGB BLD-MCNC: 8.5 G/DL (ref 13.3–17.7)
HGB BLD-MCNC: 8.7 G/DL (ref 13.3–17.7)
HGB BLD-MCNC: 8.7 G/DL (ref 13.3–17.7)
HGB BLD-MCNC: 9 G/DL (ref 13.3–17.7)
HGB BLD-MCNC: 9 G/DL (ref 13.3–17.7)
HGB BLD-MCNC: 9.2 G/DL (ref 13.3–17.7)
HGB BLD-MCNC: 9.5 G/DL (ref 13.3–17.7)
HGB BLD-MCNC: 9.7 G/DL (ref 13.3–17.7)
HGB BLD-MCNC: 9.8 G/DL (ref 13.3–17.7)
HGB BLD-MCNC: 9.9 G/DL (ref 13.3–17.7)
HGB FREE PLAS-MCNC: 40 MG/DL
HGB FREE PLAS-MCNC: 50 MG/DL
HGB FREE PLAS-MCNC: 80 MG/DL
HGB FREE PLAS-MCNC: <30 MG/DL
HGB UR QL STRIP: ABNORMAL
HGB UR QL STRIP: ABNORMAL
HOLD SPECIMEN: NORMAL
IMM GRANULOCYTES # BLD: 0.1 10E3/UL
IMM GRANULOCYTES # BLD: 0.4 10E3/UL
IMM GRANULOCYTES NFR BLD: 1 %
IMM GRANULOCYTES NFR BLD: 1 %
INR PPP: 1.16 (ref 0.85–1.15)
INR PPP: 1.16 (ref 0.85–1.15)
INR PPP: 1.17 (ref 0.85–1.15)
INR PPP: 1.18 (ref 0.85–1.15)
INR PPP: 1.19 (ref 0.85–1.15)
INR PPP: 1.2 (ref 0.85–1.15)
INR PPP: 1.22 (ref 0.85–1.15)
INR PPP: 1.22 (ref 0.85–1.15)
INR PPP: 1.23 (ref 0.85–1.15)
INR PPP: 1.24 (ref 0.85–1.15)
INR PPP: 1.25 (ref 0.85–1.15)
INR PPP: 1.25 (ref 0.85–1.15)
INR PPP: 1.26 (ref 0.85–1.15)
INR PPP: 1.28 (ref 0.85–1.15)
INR PPP: 1.29 (ref 0.85–1.15)
INR PPP: 1.29 (ref 0.85–1.15)
INR PPP: 1.3 (ref 0.85–1.15)
INR PPP: 1.34 (ref 0.85–1.15)
INR PPP: 1.38 (ref 0.85–1.15)
INR PPP: 1.4 (ref 0.85–1.15)
INR PPP: 1.42 (ref 0.85–1.15)
INR PPP: 1.42 (ref 0.85–1.15)
INR PPP: 1.44 (ref 0.85–1.15)
INR PPP: 1.45 (ref 0.85–1.15)
INR PPP: 1.47 (ref 0.85–1.15)
INR PPP: 1.48 (ref 0.85–1.15)
INR PPP: 1.5 (ref 0.85–1.15)
INR PPP: 1.67 (ref 0.85–1.15)
INTERPRETATION ECG - MUSE: NORMAL
INTERPRETATION TEGPIA: NORMAL
ISSUE DATE AND TIME: NORMAL
KETONES UR STRIP-MCNC: 20 MG/DL
KETONES UR STRIP-MCNC: NEGATIVE MG/DL
LACTATE BLD-SCNC: 0.7 MMOL/L
LACTATE BLD-SCNC: 0.8 MMOL/L
LACTATE BLD-SCNC: 1.3 MMOL/L
LACTATE BLD-SCNC: 1.3 MMOL/L
LACTATE BLD-SCNC: 1.4 MMOL/L
LACTATE BLD-SCNC: 1.4 MMOL/L
LACTATE BLD-SCNC: 1.5 MMOL/L
LACTATE BLD-SCNC: 1.7 MMOL/L
LACTATE BLD-SCNC: 2.2 MMOL/L
LACTATE BLD-SCNC: 3.9 MMOL/L
LACTATE BLD-SCNC: 5.3 MMOL/L
LACTATE BLD-SCNC: 6.8 MMOL/L
LACTATE BLD-SCNC: 6.9 MMOL/L
LACTATE SERPL-SCNC: 0.7 MMOL/L (ref 0.7–2)
LACTATE SERPL-SCNC: 0.8 MMOL/L (ref 0.7–2)
LACTATE SERPL-SCNC: 0.9 MMOL/L (ref 0.7–2)
LACTATE SERPL-SCNC: 1 MMOL/L (ref 0.7–2)
LACTATE SERPL-SCNC: 1.1 MMOL/L (ref 0.7–2)
LACTATE SERPL-SCNC: 1.2 MMOL/L (ref 0.7–2)
LACTATE SERPL-SCNC: 1.2 MMOL/L (ref 0.7–2)
LACTATE SERPL-SCNC: 1.3 MMOL/L (ref 0.7–2)
LACTATE SERPL-SCNC: 1.4 MMOL/L (ref 0.7–2)
LACTATE SERPL-SCNC: 1.5 MMOL/L (ref 0.7–2)
LACTATE SERPL-SCNC: 1.6 MMOL/L (ref 0.7–2)
LACTATE SERPL-SCNC: 1.7 MMOL/L (ref 0.7–2)
LACTATE SERPL-SCNC: 1.7 MMOL/L (ref 0.7–2)
LACTATE SERPL-SCNC: 1.8 MMOL/L (ref 0.7–2)
LACTATE SERPL-SCNC: 1.9 MMOL/L (ref 0.7–2)
LACTATE SERPL-SCNC: 2.2 MMOL/L (ref 0.7–2)
LACTATE SERPL-SCNC: 2.7 MMOL/L (ref 0.7–2)
LACTATE SERPL-SCNC: 2.7 MMOL/L (ref 0.7–2)
LACTATE SERPL-SCNC: 2.9 MMOL/L (ref 0.7–2)
LACTATE SERPL-SCNC: 3.5 MMOL/L (ref 0.7–2)
LDH SERPL L TO P-CCNC: 270 U/L (ref 0–250)
LDH SERPL L TO P-CCNC: 316 U/L (ref 0–250)
LDH SERPL L TO P-CCNC: 346 U/L (ref 0–250)
LDH SERPL L TO P-CCNC: 348 U/L (ref 0–250)
LDH SERPL L TO P-CCNC: 349 U/L (ref 0–250)
LDH SERPL L TO P-CCNC: 373 U/L (ref 0–250)
LDH SERPL L TO P-CCNC: 379 U/L (ref 0–250)
LDH SERPL L TO P-CCNC: 383 U/L (ref 0–250)
LDH SERPL L TO P-CCNC: 411 U/L (ref 0–250)
LDH SERPL L TO P-CCNC: 441 U/L (ref 0–250)
LDH SERPL L TO P-CCNC: 479 U/L (ref 0–250)
LDLC SERPL CALC-MCNC: 24 MG/DL
LEUKOCYTE ESTERASE UR QL STRIP: NEGATIVE
LEUKOCYTE ESTERASE UR QL STRIP: NEGATIVE
LIDOCAIN SERPL-MCNC: 2.9 UG/ML
LIPASE SERPL-CCNC: 279 U/L (ref 13–60)
LORAZEPAM SERPL-MCNC: NEGATIVE NG/ML
LORAZEPAM SERPL-MCNC: NEGATIVE NG/ML
LVEF ECHO: NORMAL
LVEF ECHO: NORMAL
LYMPHOCYTES # BLD AUTO: 0.9 10E3/UL (ref 0.8–5.3)
LYMPHOCYTES # BLD AUTO: 1 10E3/UL (ref 0.8–5.3)
LYMPHOCYTES NFR BLD AUTO: 4 %
LYMPHOCYTES NFR BLD AUTO: 5 %
MAGNESIUM SERPL-MCNC: 1.7 MG/DL (ref 1.7–2.3)
MAGNESIUM SERPL-MCNC: 1.9 MG/DL (ref 1.7–2.3)
MAGNESIUM SERPL-MCNC: 2 MG/DL (ref 1.7–2.3)
MAGNESIUM SERPL-MCNC: 2.1 MG/DL (ref 1.7–2.3)
MAGNESIUM SERPL-MCNC: 2.2 MG/DL (ref 1.7–2.3)
MAGNESIUM SERPL-MCNC: 2.3 MG/DL (ref 1.7–2.3)
MAGNESIUM SERPL-MCNC: 2.4 MG/DL (ref 1.7–2.3)
MAGNESIUM SERPL-MCNC: 2.6 MG/DL (ref 1.7–2.3)
MCF P HPASE BLD TEG: 66.1 MM (ref 50–70)
MCF P HPASE BLD TEG: 76.5 MM (ref 50–70)
MCF P HPASE BLD TEG: 79.6 MM (ref 50–70)
MCF P HPASE BLD TEG: 80 MM (ref 50–70)
MCF P HPASE BLD TEG: 85.4 MM (ref 50–70)
MCH RBC QN AUTO: 27.3 PG (ref 26.5–33)
MCH RBC QN AUTO: 27.4 PG (ref 26.5–33)
MCH RBC QN AUTO: 27.5 PG (ref 26.5–33)
MCH RBC QN AUTO: 27.6 PG (ref 26.5–33)
MCH RBC QN AUTO: 27.7 PG (ref 26.5–33)
MCH RBC QN AUTO: 27.7 PG (ref 26.5–33)
MCH RBC QN AUTO: 27.8 PG (ref 26.5–33)
MCH RBC QN AUTO: 27.9 PG (ref 26.5–33)
MCH RBC QN AUTO: 28 PG (ref 26.5–33)
MCH RBC QN AUTO: 28.1 PG (ref 26.5–33)
MCH RBC QN AUTO: 28.2 PG (ref 26.5–33)
MCH RBC QN AUTO: 28.2 PG (ref 26.5–33)
MCH RBC QN AUTO: 28.4 PG (ref 26.5–33)
MCH RBC QN AUTO: 28.5 PG (ref 26.5–33)
MCH RBC QN AUTO: 28.6 PG (ref 26.5–33)
MCH RBC QN AUTO: 28.7 PG (ref 26.5–33)
MCH RBC QN AUTO: 28.8 PG (ref 26.5–33)
MCH RBC QN AUTO: 28.9 PG (ref 26.5–33)
MCH RBC QN AUTO: 28.9 PG (ref 26.5–33)
MCH RBC QN AUTO: 29.1 PG (ref 26.5–33)
MCH RBC QN AUTO: 29.2 PG (ref 26.5–33)
MCH RBC QN AUTO: 29.3 PG (ref 26.5–33)
MCH RBC QN AUTO: 29.4 PG (ref 26.5–33)
MCH RBC QN AUTO: 29.5 PG (ref 26.5–33)
MCH RBC QN AUTO: 29.6 PG (ref 26.5–33)
MCH RBC QN AUTO: 29.7 PG (ref 26.5–33)
MCH RBC QN AUTO: 29.8 PG (ref 26.5–33)
MCH RBC QN AUTO: 29.9 PG (ref 26.5–33)
MCH RBC QN AUTO: 29.9 PG (ref 26.5–33)
MCHC RBC AUTO-ENTMCNC: 29.5 G/DL (ref 31.5–36.5)
MCHC RBC AUTO-ENTMCNC: 29.8 G/DL (ref 31.5–36.5)
MCHC RBC AUTO-ENTMCNC: 29.8 G/DL (ref 31.5–36.5)
MCHC RBC AUTO-ENTMCNC: 30.7 G/DL (ref 31.5–36.5)
MCHC RBC AUTO-ENTMCNC: 31 G/DL (ref 31.5–36.5)
MCHC RBC AUTO-ENTMCNC: 31.2 G/DL (ref 31.5–36.5)
MCHC RBC AUTO-ENTMCNC: 31.3 G/DL (ref 31.5–36.5)
MCHC RBC AUTO-ENTMCNC: 31.3 G/DL (ref 31.5–36.5)
MCHC RBC AUTO-ENTMCNC: 31.5 G/DL (ref 31.5–36.5)
MCHC RBC AUTO-ENTMCNC: 31.5 G/DL (ref 31.5–36.5)
MCHC RBC AUTO-ENTMCNC: 31.6 G/DL (ref 31.5–36.5)
MCHC RBC AUTO-ENTMCNC: 31.7 G/DL (ref 31.5–36.5)
MCHC RBC AUTO-ENTMCNC: 32 G/DL (ref 31.5–36.5)
MCHC RBC AUTO-ENTMCNC: 32.1 G/DL (ref 31.5–36.5)
MCHC RBC AUTO-ENTMCNC: 32.2 G/DL (ref 31.5–36.5)
MCHC RBC AUTO-ENTMCNC: 32.3 G/DL (ref 31.5–36.5)
MCHC RBC AUTO-ENTMCNC: 32.4 G/DL (ref 31.5–36.5)
MCHC RBC AUTO-ENTMCNC: 32.5 G/DL (ref 31.5–36.5)
MCHC RBC AUTO-ENTMCNC: 32.5 G/DL (ref 31.5–36.5)
MCHC RBC AUTO-ENTMCNC: 32.6 G/DL (ref 31.5–36.5)
MCHC RBC AUTO-ENTMCNC: 32.6 G/DL (ref 31.5–36.5)
MCHC RBC AUTO-ENTMCNC: 32.7 G/DL (ref 31.5–36.5)
MCHC RBC AUTO-ENTMCNC: 32.7 G/DL (ref 31.5–36.5)
MCHC RBC AUTO-ENTMCNC: 32.9 G/DL (ref 31.5–36.5)
MCHC RBC AUTO-ENTMCNC: 33 G/DL (ref 31.5–36.5)
MCHC RBC AUTO-ENTMCNC: 33.3 G/DL (ref 31.5–36.5)
MCHC RBC AUTO-ENTMCNC: 33.4 G/DL (ref 31.5–36.5)
MCHC RBC AUTO-ENTMCNC: 33.5 G/DL (ref 31.5–36.5)
MCHC RBC AUTO-ENTMCNC: 33.9 G/DL (ref 31.5–36.5)
MCHC RBC AUTO-ENTMCNC: 33.9 G/DL (ref 31.5–36.5)
MCHC RBC AUTO-ENTMCNC: 34 G/DL (ref 31.5–36.5)
MCV RBC AUTO: 83 FL (ref 78–100)
MCV RBC AUTO: 84 FL (ref 78–100)
MCV RBC AUTO: 84 FL (ref 78–100)
MCV RBC AUTO: 85 FL (ref 78–100)
MCV RBC AUTO: 86 FL (ref 78–100)
MCV RBC AUTO: 87 FL (ref 78–100)
MCV RBC AUTO: 88 FL (ref 78–100)
MCV RBC AUTO: 89 FL (ref 78–100)
MCV RBC AUTO: 90 FL (ref 78–100)
MCV RBC AUTO: 91 FL (ref 78–100)
MCV RBC AUTO: 91 FL (ref 78–100)
MCV RBC AUTO: 92 FL (ref 78–100)
MCV RBC AUTO: 92 FL (ref 78–100)
MCV RBC AUTO: 93 FL (ref 78–100)
MCV RBC AUTO: 93 FL (ref 78–100)
MCV RBC AUTO: 94 FL (ref 78–100)
MCV RBC AUTO: 94 FL (ref 78–100)
MCV RBC AUTO: 96 FL (ref 78–100)
MCV RBC AUTO: 97 FL (ref 78–100)
MCV RBC AUTO: 97 FL (ref 78–100)
MEPERIDINE SERPLBLD-MCNC: NEGATIVE NG/ML
METHADONE SAL CFM-MCNC: NEGATIVE NG/ML
MIDAZOLAM SERPL-MCNC: 109.6 NG/ML
MIDAZOLAM SERPL-MCNC: 266.5 NG/ML
MONOCYTES # BLD AUTO: 0.9 10E3/UL (ref 0–1.3)
MONOCYTES # BLD AUTO: 1.9 10E3/UL (ref 0–1.3)
MONOCYTES NFR BLD AUTO: 5 %
MONOCYTES NFR BLD AUTO: 8 %
MRSA DNA SPEC QL NAA+PROBE: NEGATIVE
MUCOUS THREADS #/AREA URNS LPF: PRESENT /LPF
NEUTROPHILS # BLD AUTO: 16.4 10E3/UL (ref 1.6–8.3)
NEUTROPHILS # BLD AUTO: 22 10E3/UL (ref 1.6–8.3)
NEUTROPHILS NFR BLD AUTO: 87 %
NEUTROPHILS NFR BLD AUTO: 89 %
NITRATE UR QL: NEGATIVE
NITRATE UR QL: NEGATIVE
NONHDLC SERPL-MCNC: 38 MG/DL
NORCHLORDIAZEP SERPL-MCNC: NEGATIVE UG/ML
NORCHLORDIAZEP SERPL-MCNC: NEGATIVE UG/ML
NORDIAZEPAM SPEC-MCNC: NEGATIVE NG/ML
NORDIAZEPAM SPEC-MCNC: NEGATIVE NG/ML
NORFENTANYL BLD CFM-MCNC: NEGATIVE NG/ML
NORFENTANYL BLD CFM-MCNC: NORMAL NG/ML
NRBC # BLD AUTO: 0 10E3/UL
NRBC # BLD AUTO: 0 10E3/UL
NRBC BLD AUTO-RTO: 0 /100
NRBC BLD AUTO-RTO: 0 /100
NSE SERPL IA-MCNC: 16.9 NG/ML
NSE SERPL IA-MCNC: 22 NG/ML
NSE SERPL IA-MCNC: 26.4 NG/ML
NSE SERPL IA-MCNC: 27.2 NG/ML
O2/TOTAL GAS SETTING VFR VENT: 100 %
O2/TOTAL GAS SETTING VFR VENT: 30 %
O2/TOTAL GAS SETTING VFR VENT: 35 %
O2/TOTAL GAS SETTING VFR VENT: 40 %
O2/TOTAL GAS SETTING VFR VENT: 45 %
O2/TOTAL GAS SETTING VFR VENT: 50 %
O2/TOTAL GAS SETTING VFR VENT: 55 %
O2/TOTAL GAS SETTING VFR VENT: 60 %
O2/TOTAL GAS SETTING VFR VENT: 70 %
O2/TOTAL GAS SETTING VFR VENT: 80 %
O2/TOTAL GAS SETTING VFR VENT: 94 %
OPIATES SPEC-MCNC: NEGATIVE NG/ML
OPIATES UR QL SCN: ABNORMAL
OXAZEPAM SERPL CFM-MCNC: NEGATIVE NG/ML
OXAZEPAM SERPL CFM-MCNC: NEGATIVE NG/ML
OXYCODONE SERPLBLD SCN-MCNC: NEGATIVE NG/ML
OXYHGB MFR BLD: 88 % (ref 92–100)
OXYHGB MFR BLD: 92 % (ref 92–100)
OXYHGB MFR BLD: 92 % (ref 92–100)
OXYHGB MFR BLD: 93 % (ref 92–100)
OXYHGB MFR BLD: 94 % (ref 92–100)
OXYHGB MFR BLD: 94 % (ref 92–100)
OXYHGB MFR BLD: 95 % (ref 92–100)
OXYHGB MFR BLD: 96 % (ref 92–100)
OXYHGB MFR BLD: 97 % (ref 92–100)
OXYHGB MFR BLD: 98 % (ref 92–100)
OXYHGB MFR BLD: 99 % (ref 92–100)
OXYHGB MFR BLDA: 100 % (ref 92–100)
OXYHGB MFR BLDA: 87 % (ref 75–100)
OXYHGB MFR BLDA: 95 % (ref 92–100)
OXYHGB MFR BLDA: 97 % (ref 92–100)
OXYHGB MFR BLDA: 98 % (ref 75–100)
OXYHGB MFR BLDA: 99 % (ref 75–100)
OXYHGB MFR BLDA: 99 % (ref 92–100)
OXYHGB MFR BLDA: 99 % (ref 92–100)
OXYHGB MFR BLDV: 62 %
OXYHGB MFR BLDV: 65 % (ref 70–75)
OXYHGB MFR BLDV: 73 %
OXYHGB MFR BLDV: 73 % (ref 70–75)
OXYHGB MFR BLDV: 74 %
OXYHGB MFR BLDV: 74 %
OXYHGB MFR BLDV: 74 % (ref 70–75)
OXYHGB MFR BLDV: 75 %
OXYHGB MFR BLDV: 76 %
OXYHGB MFR BLDV: 77 %
OXYHGB MFR BLDV: 79 %
OXYHGB MFR BLDV: 80 %
OXYHGB MFR BLDV: 82 %
OXYHGB MFR BLDV: 82 %
P AXIS - MUSE: -15 DEGREES
P AXIS - MUSE: -19 DEGREES
P AXIS - MUSE: -25 DEGREES
P AXIS - MUSE: -6 DEGREES
P AXIS - MUSE: -7 DEGREES
P AXIS - MUSE: -8 DEGREES
P AXIS - MUSE: 0 DEGREES
P AXIS - MUSE: 1 DEGREES
P AXIS - MUSE: 1 DEGREES
P AXIS - MUSE: 12 DEGREES
P AXIS - MUSE: 15 DEGREES
P AXIS - MUSE: 3 DEGREES
P AXIS - MUSE: 4 DEGREES
P AXIS - MUSE: 90 DEGREES
PA AA BLD-ACNC: 100 %
PA AA BLD-ACNC: 62 %
PA AA BLD-ACNC: 91 %
PA AA BLD-ACNC: 97 %
PA ADP BLD-ACNC: 59 %
PA ADP BLD-ACNC: 74 %
PA ADP BLD-ACNC: 98 %
PA ADP BLD-ACNC: 98 %
PCO2 BLD: 26 MM HG (ref 35–45)
PCO2 BLD: 29 MM HG (ref 35–45)
PCO2 BLD: 30 MM HG (ref 35–45)
PCO2 BLD: 32 MM HG (ref 35–45)
PCO2 BLD: 33 MM HG (ref 35–45)
PCO2 BLD: 33 MM HG (ref 35–45)
PCO2 BLD: 34 MM HG (ref 35–45)
PCO2 BLD: 35 MM HG (ref 35–45)
PCO2 BLD: 36 MM HG (ref 35–45)
PCO2 BLD: 37 MM HG (ref 35–45)
PCO2 BLD: 38 MM HG (ref 35–45)
PCO2 BLD: 39 MM HG (ref 35–45)
PCO2 BLD: 40 MM HG (ref 35–45)
PCO2 BLD: 41 MM HG (ref 35–45)
PCO2 BLD: 42 MM HG (ref 35–45)
PCO2 BLD: 43 MM HG (ref 35–45)
PCO2 BLD: 44 MM HG (ref 35–45)
PCO2 BLD: 45 MM HG (ref 35–45)
PCO2 BLD: 46 MM HG (ref 35–45)
PCO2 BLD: 47 MM HG (ref 35–45)
PCO2 BLD: 48 MM HG (ref 35–45)
PCO2 BLD: 49 MM HG (ref 35–45)
PCO2 BLD: 50 MM HG (ref 35–45)
PCO2 BLD: 51 MM HG (ref 35–45)
PCO2 BLDA: 29 MM HG (ref 35–45)
PCO2 BLDA: 33 MM HG (ref 35–45)
PCO2 BLDA: 36 MM HG (ref 35–45)
PCO2 BLDA: 36 MM HG (ref 35–45)
PCO2 BLDA: 38 MM HG (ref 35–45)
PCO2 BLDA: 39 MM HG (ref 35–45)
PCO2 BLDA: 40 MM HG (ref 35–45)
PCO2 BLDA: 42 MM HG (ref 35–45)
PCO2 BLDA: 42 MM HG (ref 35–45)
PCO2 BLDA: 43 MM HG (ref 35–45)
PCO2 BLDA: 43 MM HG (ref 35–45)
PCO2 BLDA: 44 MM HG (ref 35–45)
PCO2 BLDA: 45 MM HG (ref 35–45)
PCO2 BLDA: 50 MM HG (ref 35–45)
PCO2 BLDA: 54 MM HG (ref 35–45)
PCO2 BLDA: 54 MM HG (ref 35–45)
PCO2 BLDA: 55 MM HG (ref 35–45)
PCO2 BLDA: 56 MM HG (ref 35–45)
PCO2 BLDA: 57 MM HG (ref 35–45)
PCO2 BLDA: 57 MM HG (ref 35–45)
PCO2 BLDA: 58 MM HG (ref 35–45)
PCO2 BLDA: 59 MM HG (ref 35–45)
PCO2 BLDA: 63 MM HG (ref 35–45)
PCO2 BLDA: 67 MM HG (ref 35–45)
PCO2 BLDA: 69 MM HG (ref 35–45)
PCO2 BLDV: 31 MM HG (ref 40–50)
PCO2 BLDV: 32 MM HG (ref 40–50)
PCO2 BLDV: 36 MM HG (ref 40–50)
PCO2 BLDV: 40 MM HG (ref 40–50)
PCO2 BLDV: 46 MM HG (ref 40–50)
PCO2 BLDV: 53 MM HG (ref 40–50)
PCO2 BLDV: 55 MM HG (ref 40–50)
PCO2 BLDV: 55 MM HG (ref 40–50)
PCO2 BLDV: 56 MM HG (ref 40–50)
PCO2 BLDV: 56 MM HG (ref 40–50)
PCO2 BLDV: 58 MM HG (ref 40–50)
PCO2 BLDV: 59 MM HG (ref 40–50)
PCO2 BLDV: 59 MM HG (ref 40–50)
PCO2 BLDV: 67 MM HG (ref 40–50)
PCO2 BLDV: 71 MM HG (ref 40–50)
PCO2 BLDV: 74 MM HG (ref 40–50)
PCP SPEC-MCNC: NEGATIVE NG/ML
PH BLD: 7.29 [PH] (ref 7.35–7.45)
PH BLD: 7.31 [PH] (ref 7.35–7.45)
PH BLD: 7.31 [PH] (ref 7.35–7.45)
PH BLD: 7.34 [PH] (ref 7.35–7.45)
PH BLD: 7.34 [PH] (ref 7.35–7.45)
PH BLD: 7.36 [PH] (ref 7.35–7.45)
PH BLD: 7.37 [PH] (ref 7.35–7.45)
PH BLD: 7.39 [PH] (ref 7.35–7.45)
PH BLD: 7.39 [PH] (ref 7.35–7.45)
PH BLD: 7.4 [PH] (ref 7.35–7.45)
PH BLD: 7.41 [PH] (ref 7.35–7.45)
PH BLD: 7.42 [PH] (ref 7.35–7.45)
PH BLD: 7.43 [PH] (ref 7.35–7.45)
PH BLD: 7.44 [PH] (ref 7.35–7.45)
PH BLD: 7.45 [PH] (ref 7.35–7.45)
PH BLD: 7.46 [PH] (ref 7.35–7.45)
PH BLD: 7.47 [PH] (ref 7.35–7.45)
PH BLD: 7.48 [PH] (ref 7.35–7.45)
PH BLD: 7.49 [PH] (ref 7.35–7.45)
PH BLD: 7.5 [PH] (ref 7.35–7.45)
PH BLD: 7.51 [PH] (ref 7.35–7.45)
PH BLD: 7.51 [PH] (ref 7.35–7.45)
PH BLD: 7.52 [PH] (ref 7.35–7.45)
PH BLD: 7.53 [PH] (ref 7.35–7.45)
PH BLD: 7.53 [PH] (ref 7.35–7.45)
PH BLD: 7.54 [PH] (ref 7.35–7.45)
PH BLD: 7.55 [PH] (ref 7.35–7.45)
PH BLD: 7.56 [PH] (ref 7.35–7.45)
PH BLD: 7.58 [PH] (ref 7.35–7.45)
PH BLD: 7.61 [PH] (ref 7.35–7.45)
PH BLD: 7.61 [PH] (ref 7.35–7.45)
PH BLDA: 7.27 [PH] (ref 7.35–7.45)
PH BLDA: 7.29 [PH] (ref 7.35–7.45)
PH BLDA: 7.29 [PH] (ref 7.35–7.45)
PH BLDA: 7.3 [PH] (ref 7.35–7.45)
PH BLDA: 7.31 [PH] (ref 7.35–7.45)
PH BLDA: 7.32 [PH] (ref 7.35–7.45)
PH BLDA: 7.33 [PH] (ref 7.35–7.45)
PH BLDA: 7.34 [PH] (ref 7.35–7.45)
PH BLDA: 7.35 [PH] (ref 7.35–7.45)
PH BLDA: 7.37 [PH] (ref 7.35–7.45)
PH BLDA: 7.37 [PH] (ref 7.35–7.45)
PH BLDA: 7.38 [PH] (ref 7.35–7.45)
PH BLDA: 7.38 [PH] (ref 7.35–7.45)
PH BLDA: 7.4 [PH] (ref 7.35–7.45)
PH BLDA: 7.4 [PH] (ref 7.35–7.45)
PH BLDA: 7.44 [PH] (ref 7.35–7.45)
PH BLDA: 7.45 [PH] (ref 7.35–7.45)
PH BLDA: 7.46 [PH] (ref 7.35–7.45)
PH BLDA: 7.46 [PH] (ref 7.35–7.45)
PH BLDA: 7.48 [PH] (ref 7.35–7.45)
PH BLDA: 7.5 [PH] (ref 7.35–7.45)
PH BLDA: 7.58 [PH] (ref 7.35–7.45)
PH BLDV: 7.26 [PH] (ref 7.32–7.43)
PH BLDV: 7.29 [PH] (ref 7.32–7.43)
PH BLDV: 7.3 [PH] (ref 7.32–7.43)
PH BLDV: 7.31 [PH] (ref 7.32–7.43)
PH BLDV: 7.31 [PH] (ref 7.32–7.43)
PH BLDV: 7.32 [PH] (ref 7.32–7.43)
PH BLDV: 7.32 [PH] (ref 7.32–7.43)
PH BLDV: 7.33 [PH] (ref 7.32–7.43)
PH BLDV: 7.34 [PH] (ref 7.32–7.43)
PH BLDV: 7.35 [PH] (ref 7.32–7.43)
PH BLDV: 7.35 [PH] (ref 7.32–7.43)
PH BLDV: 7.36 [PH] (ref 7.32–7.43)
PH BLDV: 7.38 [PH] (ref 7.32–7.43)
PH BLDV: 7.38 [PH] (ref 7.32–7.43)
PH BLDV: 7.39 [PH] (ref 7.32–7.43)
PH BLDV: 7.42 [PH] (ref 7.32–7.43)
PH UR STRIP: 5 [PH] (ref 5–7)
PH UR STRIP: 5.5 [PH] (ref 5–7)
PHOSPHATE SERPL-MCNC: 2.2 MG/DL (ref 2.5–4.5)
PHOSPHATE SERPL-MCNC: 2.6 MG/DL (ref 2.5–4.5)
PHOSPHATE SERPL-MCNC: 2.8 MG/DL (ref 2.5–4.5)
PHOSPHATE SERPL-MCNC: 3.1 MG/DL (ref 2.5–4.5)
PHOSPHATE SERPL-MCNC: 3.1 MG/DL (ref 2.5–4.5)
PHOSPHATE SERPL-MCNC: 3.2 MG/DL (ref 2.5–4.5)
PHOSPHATE SERPL-MCNC: 3.2 MG/DL (ref 2.5–4.5)
PHOSPHATE SERPL-MCNC: 3.4 MG/DL (ref 2.5–4.5)
PHOSPHATE SERPL-MCNC: 3.4 MG/DL (ref 2.5–4.5)
PHOSPHATE SERPL-MCNC: 3.5 MG/DL (ref 2.5–4.5)
PHOSPHATE SERPL-MCNC: 3.7 MG/DL (ref 2.5–4.5)
PHOSPHATE SERPL-MCNC: 3.9 MG/DL (ref 2.5–4.5)
PHOSPHATE SERPL-MCNC: 4.2 MG/DL (ref 2.5–4.5)
PHOSPHATE SERPL-MCNC: 4.3 MG/DL (ref 2.5–4.5)
PHOSPHATE SERPL-MCNC: 5 MG/DL (ref 2.5–4.5)
PLATELET # BLD AUTO: 108 10E3/UL (ref 150–450)
PLATELET # BLD AUTO: 116 10E3/UL (ref 150–450)
PLATELET # BLD AUTO: 120 10E3/UL (ref 150–450)
PLATELET # BLD AUTO: 121 10E3/UL (ref 150–450)
PLATELET # BLD AUTO: 125 10E3/UL (ref 150–450)
PLATELET # BLD AUTO: 129 10E3/UL (ref 150–450)
PLATELET # BLD AUTO: 133 10E3/UL (ref 150–450)
PLATELET # BLD AUTO: 137 10E3/UL (ref 150–450)
PLATELET # BLD AUTO: 142 10E3/UL (ref 150–450)
PLATELET # BLD AUTO: 142 10E3/UL (ref 150–450)
PLATELET # BLD AUTO: 143 10E3/UL (ref 150–450)
PLATELET # BLD AUTO: 144 10E3/UL (ref 150–450)
PLATELET # BLD AUTO: 148 10E3/UL (ref 150–450)
PLATELET # BLD AUTO: 150 10E3/UL (ref 150–450)
PLATELET # BLD AUTO: 152 10E3/UL (ref 150–450)
PLATELET # BLD AUTO: 155 10E3/UL (ref 150–450)
PLATELET # BLD AUTO: 165 10E3/UL (ref 150–450)
PLATELET # BLD AUTO: 170 10E3/UL (ref 150–450)
PLATELET # BLD AUTO: 174 10E3/UL (ref 150–450)
PLATELET # BLD AUTO: 177 10E3/UL (ref 150–450)
PLATELET # BLD AUTO: 179 10E3/UL (ref 150–450)
PLATELET # BLD AUTO: 182 10E3/UL (ref 150–450)
PLATELET # BLD AUTO: 183 10E3/UL (ref 150–450)
PLATELET # BLD AUTO: 193 10E3/UL (ref 150–450)
PLATELET # BLD AUTO: 193 10E3/UL (ref 150–450)
PLATELET # BLD AUTO: 198 10E3/UL (ref 150–450)
PLATELET # BLD AUTO: 198 10E3/UL (ref 150–450)
PLATELET # BLD AUTO: 201 10E3/UL (ref 150–450)
PLATELET # BLD AUTO: 203 10E3/UL (ref 150–450)
PLATELET # BLD AUTO: 207 10E3/UL (ref 150–450)
PLATELET # BLD AUTO: 209 10E3/UL (ref 150–450)
PLATELET # BLD AUTO: 210 10E3/UL (ref 150–450)
PLATELET # BLD AUTO: 215 10E3/UL (ref 150–450)
PLATELET # BLD AUTO: 222 10E3/UL (ref 150–450)
PLATELET # BLD AUTO: 231 10E3/UL (ref 150–450)
PLATELET # BLD AUTO: 247 10E3/UL (ref 150–450)
PLATELET # BLD AUTO: 292 10E3/UL (ref 150–450)
PLATELET # BLD AUTO: 338 10E3/UL (ref 150–450)
PLATELET # BLD AUTO: 381 10E3/UL (ref 150–450)
PLATELET # BLD AUTO: 455 10E3/UL (ref 150–450)
PLATELET # BLD AUTO: 484 10E3/UL (ref 150–450)
PLATELET # BLD AUTO: 520 10E3/UL (ref 150–450)
PLATELET # BLD AUTO: 544 10E3/UL (ref 150–450)
PLATELET # BLD AUTO: 574 10E3/UL (ref 150–450)
PO2 BLD: 100 MM HG (ref 80–105)
PO2 BLD: 100 MM HG (ref 80–105)
PO2 BLD: 101 MM HG (ref 80–105)
PO2 BLD: 101 MM HG (ref 80–105)
PO2 BLD: 102 MM HG (ref 80–105)
PO2 BLD: 103 MM HG (ref 80–105)
PO2 BLD: 103 MM HG (ref 80–105)
PO2 BLD: 104 MM HG (ref 80–105)
PO2 BLD: 104 MM HG (ref 80–105)
PO2 BLD: 105 MM HG (ref 80–105)
PO2 BLD: 107 MM HG (ref 80–105)
PO2 BLD: 107 MM HG (ref 80–105)
PO2 BLD: 108 MM HG (ref 80–105)
PO2 BLD: 109 MM HG (ref 80–105)
PO2 BLD: 110 MM HG (ref 80–105)
PO2 BLD: 111 MM HG (ref 80–105)
PO2 BLD: 113 MM HG (ref 80–105)
PO2 BLD: 114 MM HG (ref 80–105)
PO2 BLD: 115 MM HG (ref 80–105)
PO2 BLD: 115 MM HG (ref 80–105)
PO2 BLD: 116 MM HG (ref 80–105)
PO2 BLD: 116 MM HG (ref 80–105)
PO2 BLD: 117 MM HG (ref 80–105)
PO2 BLD: 117 MM HG (ref 80–105)
PO2 BLD: 119 MM HG (ref 80–105)
PO2 BLD: 121 MM HG (ref 80–105)
PO2 BLD: 121 MM HG (ref 80–105)
PO2 BLD: 123 MM HG (ref 80–105)
PO2 BLD: 123 MM HG (ref 80–105)
PO2 BLD: 124 MM HG (ref 80–105)
PO2 BLD: 125 MM HG (ref 80–105)
PO2 BLD: 128 MM HG (ref 80–105)
PO2 BLD: 129 MM HG (ref 80–105)
PO2 BLD: 132 MM HG (ref 80–105)
PO2 BLD: 133 MM HG (ref 80–105)
PO2 BLD: 136 MM HG (ref 80–105)
PO2 BLD: 142 MM HG (ref 80–105)
PO2 BLD: 147 MM HG (ref 80–105)
PO2 BLD: 153 MM HG (ref 80–105)
PO2 BLD: 154 MM HG (ref 80–105)
PO2 BLD: 158 MM HG (ref 80–105)
PO2 BLD: 162 MM HG (ref 80–105)
PO2 BLD: 165 MM HG (ref 80–105)
PO2 BLD: 174 MM HG (ref 80–105)
PO2 BLD: 179 MM HG (ref 80–105)
PO2 BLD: 185 MM HG (ref 80–105)
PO2 BLD: 197 MM HG (ref 80–105)
PO2 BLD: 203 MM HG (ref 80–105)
PO2 BLD: 239 MM HG (ref 80–105)
PO2 BLD: 273 MM HG (ref 80–105)
PO2 BLD: 286 MM HG (ref 80–105)
PO2 BLD: 58 MM HG (ref 80–105)
PO2 BLD: 65 MM HG (ref 80–105)
PO2 BLD: 66 MM HG (ref 80–105)
PO2 BLD: 72 MM HG (ref 80–105)
PO2 BLD: 72 MM HG (ref 80–105)
PO2 BLD: 74 MM HG (ref 80–105)
PO2 BLD: 77 MM HG (ref 80–105)
PO2 BLD: 77 MM HG (ref 80–105)
PO2 BLD: 78 MM HG (ref 80–105)
PO2 BLD: 79 MM HG (ref 80–105)
PO2 BLD: 80 MM HG (ref 80–105)
PO2 BLD: 81 MM HG (ref 80–105)
PO2 BLD: 82 MM HG (ref 80–105)
PO2 BLD: 82 MM HG (ref 80–105)
PO2 BLD: 86 MM HG (ref 80–105)
PO2 BLD: 87 MM HG (ref 80–105)
PO2 BLD: 88 MM HG (ref 80–105)
PO2 BLD: 88 MM HG (ref 80–105)
PO2 BLD: 90 MM HG (ref 80–105)
PO2 BLD: 90 MM HG (ref 80–105)
PO2 BLD: 91 MM HG (ref 80–105)
PO2 BLD: 93 MM HG (ref 80–105)
PO2 BLD: 93 MM HG (ref 80–105)
PO2 BLD: 94 MM HG (ref 80–105)
PO2 BLD: 95 MM HG (ref 80–105)
PO2 BLD: 96 MM HG (ref 80–105)
PO2 BLD: 96 MM HG (ref 80–105)
PO2 BLD: 98 MM HG (ref 80–105)
PO2 BLD: 99 MM HG (ref 80–105)
PO2 BLDA: 105 MM HG (ref 80–105)
PO2 BLDA: 109 MM HG (ref 80–105)
PO2 BLDA: 120 MM HG (ref 80–105)
PO2 BLDA: 128 MM HG (ref 80–105)
PO2 BLDA: 141 MM HG (ref 80–105)
PO2 BLDA: 150 MM HG (ref 80–105)
PO2 BLDA: 163 MM HG (ref 80–105)
PO2 BLDA: 199 MM HG (ref 80–105)
PO2 BLDA: 214 MM HG (ref 80–105)
PO2 BLDA: 224 MM HG (ref 80–105)
PO2 BLDA: 228 MM HG (ref 80–105)
PO2 BLDA: 230 MM HG (ref 80–105)
PO2 BLDA: 265 MM HG (ref 80–105)
PO2 BLDA: 266 MM HG (ref 80–105)
PO2 BLDA: 297 MM HG (ref 80–105)
PO2 BLDA: 302 MM HG (ref 80–105)
PO2 BLDA: 307 MM HG (ref 80–105)
PO2 BLDA: 322 MM HG (ref 80–105)
PO2 BLDA: 477 MM HG (ref 80–105)
PO2 BLDA: 590 MM HG (ref 80–105)
PO2 BLDA: 61 MM HG (ref 80–105)
PO2 BLDA: 69 MM HG (ref 80–105)
PO2 BLDA: 72 MM HG (ref 80–105)
PO2 BLDA: 74 MM HG (ref 80–105)
PO2 BLDA: 97 MM HG (ref 80–105)
PO2 BLDV: 34 MM HG (ref 25–47)
PO2 BLDV: 37 MM HG (ref 25–47)
PO2 BLDV: 41 MM HG (ref 25–47)
PO2 BLDV: 42 MM HG (ref 25–47)
PO2 BLDV: 43 MM HG (ref 25–47)
PO2 BLDV: 44 MM HG (ref 25–47)
PO2 BLDV: 45 MM HG (ref 25–47)
PO2 BLDV: 46 MM HG (ref 25–47)
PO2 BLDV: 48 MM HG (ref 25–47)
PO2 BLDV: 48 MM HG (ref 25–47)
PO2 BLDV: 50 MM HG (ref 25–47)
PO2 BLDV: 50 MM HG (ref 25–47)
PO2 BLDV: 51 MM HG (ref 25–47)
PO2 BLDV: 51 MM HG (ref 25–47)
PO2 BLDV: 81 MM HG (ref 25–47)
PO2 BLDV: 84 MM HG (ref 25–47)
POTASSIUM BLD-SCNC: 2.7 MMOL/L (ref 3.4–5.3)
POTASSIUM BLD-SCNC: 3 MMOL/L (ref 3.5–5)
POTASSIUM BLD-SCNC: 3.3 MMOL/L (ref 3.5–5)
POTASSIUM BLD-SCNC: 3.5 MMOL/L (ref 3.5–5)
POTASSIUM BLD-SCNC: 3.7 MMOL/L (ref 3.5–5)
POTASSIUM BLD-SCNC: 3.8 MMOL/L (ref 3.5–5)
POTASSIUM BLD-SCNC: 3.8 MMOL/L (ref 3.5–5)
POTASSIUM BLD-SCNC: 3.9 MMOL/L (ref 3.5–5)
POTASSIUM BLD-SCNC: 3.9 MMOL/L (ref 3.5–5)
POTASSIUM BLD-SCNC: 4 MMOL/L (ref 3.4–5.3)
POTASSIUM BLD-SCNC: 4 MMOL/L (ref 3.5–5)
POTASSIUM BLD-SCNC: 4 MMOL/L (ref 3.5–5)
POTASSIUM SERPL-SCNC: 3.5 MMOL/L (ref 3.4–5.3)
POTASSIUM SERPL-SCNC: 3.5 MMOL/L (ref 3.4–5.3)
POTASSIUM SERPL-SCNC: 3.6 MMOL/L (ref 3.4–5.3)
POTASSIUM SERPL-SCNC: 3.7 MMOL/L (ref 3.4–5.3)
POTASSIUM SERPL-SCNC: 3.8 MMOL/L (ref 3.4–5.3)
POTASSIUM SERPL-SCNC: 3.9 MMOL/L (ref 3.4–5.3)
POTASSIUM SERPL-SCNC: 4 MMOL/L (ref 3.4–5.3)
POTASSIUM SERPL-SCNC: 4.1 MMOL/L (ref 3.4–5.3)
POTASSIUM SERPL-SCNC: 4.2 MMOL/L (ref 3.4–5.3)
POTASSIUM SERPL-SCNC: 4.2 MMOL/L (ref 3.4–5.3)
POTASSIUM SERPL-SCNC: 4.3 MMOL/L (ref 3.4–5.3)
POTASSIUM SERPL-SCNC: 4.4 MMOL/L (ref 3.4–5.3)
POTASSIUM SERPL-SCNC: 4.4 MMOL/L (ref 3.4–5.3)
POTASSIUM SERPL-SCNC: 4.5 MMOL/L (ref 3.4–5.3)
POTASSIUM SERPL-SCNC: 4.5 MMOL/L (ref 3.4–5.3)
POTASSIUM SERPL-SCNC: 4.6 MMOL/L (ref 3.4–5.3)
POTASSIUM SERPL-SCNC: 4.9 MMOL/L (ref 3.4–5.3)
POTASSIUM SERPL-SCNC: 4.9 MMOL/L (ref 3.4–5.3)
PR INTERVAL - MUSE: 134 MS
PR INTERVAL - MUSE: 142 MS
PR INTERVAL - MUSE: 142 MS
PR INTERVAL - MUSE: 144 MS
PR INTERVAL - MUSE: 148 MS
PR INTERVAL - MUSE: 150 MS
PR INTERVAL - MUSE: 154 MS
PR INTERVAL - MUSE: 156 MS
PR INTERVAL - MUSE: 158 MS
PR INTERVAL - MUSE: 158 MS
PR INTERVAL - MUSE: 160 MS
PR INTERVAL - MUSE: 170 MS
PR INTERVAL - MUSE: 170 MS
PR INTERVAL - MUSE: 174 MS
PROCALCITONIN SERPL IA-MCNC: 0.43 NG/ML
PROCALCITONIN SERPL IA-MCNC: 0.69 NG/ML
PROCALCITONIN SERPL IA-MCNC: 1.41 NG/ML
PROCALCITONIN SERPL IA-MCNC: <0.05 NG/ML
PROPOXYPH SPEC-MCNC: NEGATIVE NG/ML
PROT SERPL-MCNC: 4.7 G/DL (ref 6.4–8.3)
PROT SERPL-MCNC: 5 G/DL (ref 6.4–8.3)
PROT SERPL-MCNC: 5 G/DL (ref 6.4–8.3)
PROT SERPL-MCNC: 5.1 G/DL (ref 6.4–8.3)
PROT SERPL-MCNC: 5.2 G/DL (ref 6.4–8.3)
PROT SERPL-MCNC: 5.3 G/DL (ref 6.4–8.3)
PROT SERPL-MCNC: 5.4 G/DL (ref 6.4–8.3)
PROT SERPL-MCNC: 5.5 G/DL (ref 6.4–8.3)
PROT SERPL-MCNC: 5.6 G/DL (ref 6.4–8.3)
PROT SERPL-MCNC: 5.7 G/DL (ref 6.4–8.3)
PROT SERPL-MCNC: 5.8 G/DL (ref 6.4–8.3)
PROT SERPL-MCNC: 5.9 G/DL (ref 6.4–8.3)
PROT SERPL-MCNC: 6 G/DL (ref 6.4–8.3)
PROT SERPL-MCNC: 6.1 G/DL (ref 6.4–8.3)
PROT SERPL-MCNC: 6.2 G/DL (ref 6.4–8.3)
PROT SERPL-MCNC: 6.2 G/DL (ref 6.4–8.3)
PROT SERPL-MCNC: 6.3 G/DL (ref 6.4–8.3)
PROT SERPL-MCNC: 6.5 G/DL (ref 6.4–8.3)
QRS DURATION - MUSE: 58 MS
QRS DURATION - MUSE: 70 MS
QRS DURATION - MUSE: 76 MS
QRS DURATION - MUSE: 80 MS
QRS DURATION - MUSE: 82 MS
QRS DURATION - MUSE: 84 MS
QRS DURATION - MUSE: 88 MS
QRS DURATION - MUSE: 90 MS
QRS DURATION - MUSE: 96 MS
QRS DURATION - MUSE: 98 MS
QT - MUSE: 340 MS
QT - MUSE: 382 MS
QT - MUSE: 390 MS
QT - MUSE: 392 MS
QT - MUSE: 396 MS
QT - MUSE: 402 MS
QT - MUSE: 416 MS
QT - MUSE: 416 MS
QT - MUSE: 446 MS
QT - MUSE: 474 MS
QT - MUSE: 484 MS
QT - MUSE: 496 MS
QT - MUSE: 498 MS
QT - MUSE: 502 MS
QTC - MUSE: 414 MS
QTC - MUSE: 425 MS
QTC - MUSE: 427 MS
QTC - MUSE: 427 MS
QTC - MUSE: 448 MS
QTC - MUSE: 466 MS
QTC - MUSE: 472 MS
QTC - MUSE: 474 MS
QTC - MUSE: 478 MS
QTC - MUSE: 479 MS
QTC - MUSE: 509 MS
QTC - MUSE: 526 MS
QTC - MUSE: 542 MS
QTC - MUSE: 551 MS
R AXIS - MUSE: -12 DEGREES
R AXIS - MUSE: -29 DEGREES
R AXIS - MUSE: -30 DEGREES
R AXIS - MUSE: -30 DEGREES
R AXIS - MUSE: -31 DEGREES
R AXIS - MUSE: -31 DEGREES
R AXIS - MUSE: -32 DEGREES
R AXIS - MUSE: -32 DEGREES
R AXIS - MUSE: -33 DEGREES
R AXIS - MUSE: -33 DEGREES
R AXIS - MUSE: -34 DEGREES
R AXIS - MUSE: -34 DEGREES
R AXIS - MUSE: -36 DEGREES
R AXIS - MUSE: -37 DEGREES
RADIOLOGIST FLAGS: ABNORMAL
RADIOLOGIST FLAGS: ABNORMAL
RBC # BLD AUTO: 2.32 10E6/UL (ref 4.4–5.9)
RBC # BLD AUTO: 2.35 10E6/UL (ref 4.4–5.9)
RBC # BLD AUTO: 2.45 10E6/UL (ref 4.4–5.9)
RBC # BLD AUTO: 2.5 10E6/UL (ref 4.4–5.9)
RBC # BLD AUTO: 2.52 10E6/UL (ref 4.4–5.9)
RBC # BLD AUTO: 2.53 10E6/UL (ref 4.4–5.9)
RBC # BLD AUTO: 2.54 10E6/UL (ref 4.4–5.9)
RBC # BLD AUTO: 2.56 10E6/UL (ref 4.4–5.9)
RBC # BLD AUTO: 2.56 10E6/UL (ref 4.4–5.9)
RBC # BLD AUTO: 2.57 10E6/UL (ref 4.4–5.9)
RBC # BLD AUTO: 2.57 10E6/UL (ref 4.4–5.9)
RBC # BLD AUTO: 2.6 10E6/UL (ref 4.4–5.9)
RBC # BLD AUTO: 2.61 10E6/UL (ref 4.4–5.9)
RBC # BLD AUTO: 2.64 10E6/UL (ref 4.4–5.9)
RBC # BLD AUTO: 2.66 10E6/UL (ref 4.4–5.9)
RBC # BLD AUTO: 2.66 10E6/UL (ref 4.4–5.9)
RBC # BLD AUTO: 2.67 10E6/UL (ref 4.4–5.9)
RBC # BLD AUTO: 2.68 10E6/UL (ref 4.4–5.9)
RBC # BLD AUTO: 2.69 10E6/UL (ref 4.4–5.9)
RBC # BLD AUTO: 2.74 10E6/UL (ref 4.4–5.9)
RBC # BLD AUTO: 2.75 10E6/UL (ref 4.4–5.9)
RBC # BLD AUTO: 2.77 10E6/UL (ref 4.4–5.9)
RBC # BLD AUTO: 2.77 10E6/UL (ref 4.4–5.9)
RBC # BLD AUTO: 2.79 10E6/UL (ref 4.4–5.9)
RBC # BLD AUTO: 2.81 10E6/UL (ref 4.4–5.9)
RBC # BLD AUTO: 2.92 10E6/UL (ref 4.4–5.9)
RBC # BLD AUTO: 2.96 10E6/UL (ref 4.4–5.9)
RBC # BLD AUTO: 2.98 10E6/UL (ref 4.4–5.9)
RBC # BLD AUTO: 3.03 10E6/UL (ref 4.4–5.9)
RBC # BLD AUTO: 3.08 10E6/UL (ref 4.4–5.9)
RBC # BLD AUTO: 3.1 10E6/UL (ref 4.4–5.9)
RBC # BLD AUTO: 3.21 10E6/UL (ref 4.4–5.9)
RBC # BLD AUTO: 3.28 10E6/UL (ref 4.4–5.9)
RBC # BLD AUTO: 3.45 10E6/UL (ref 4.4–5.9)
RBC # BLD AUTO: 3.52 10E6/UL (ref 4.4–5.9)
RBC # BLD AUTO: 3.56 10E6/UL (ref 4.4–5.9)
RBC # BLD AUTO: 3.74 10E6/UL (ref 4.4–5.9)
RBC # BLD AUTO: 3.81 10E6/UL (ref 4.4–5.9)
RBC # BLD AUTO: 4.06 10E6/UL (ref 4.4–5.9)
RBC # BLD AUTO: 4.37 10E6/UL (ref 4.4–5.9)
RBC # BLD AUTO: 4.39 10E6/UL (ref 4.4–5.9)
RBC # BLD AUTO: 4.49 10E6/UL (ref 4.4–5.9)
RBC # BLD AUTO: 4.5 10E6/UL (ref 4.4–5.9)
RBC # BLD AUTO: 4.59 10E6/UL (ref 4.4–5.9)
RBC URINE: 109 /HPF
RBC URINE: 40 /HPF
S100 CA BINDING PROTEIN B SER-MCNC: 184 NG/L
S100 CA BINDING PROTEIN B SER-MCNC: 27 NG/L
S100 CA BINDING PROTEIN B SER-MCNC: 44 NG/L
S100 CA BINDING PROTEIN B SER-MCNC: 67 NG/L
SA TARGET DNA: POSITIVE
SAO2 % BLDV: 95 % (ref 94–100)
SAO2 % BLDV: 96 % (ref 94–100)
SARS-COV-2 RNA RESP QL NAA+PROBE: NEGATIVE
SARS-COV-2 RNA RESP QL NAA+PROBE: NEGATIVE
SODIUM BLD-SCNC: 133 MMOL/L (ref 133–144)
SODIUM BLD-SCNC: 137 MMOL/L (ref 133–144)
SODIUM BLD-SCNC: 138 MMOL/L (ref 133–144)
SODIUM BLD-SCNC: 138 MMOL/L (ref 133–144)
SODIUM BLD-SCNC: 139 MMOL/L (ref 133–144)
SODIUM BLD-SCNC: 140 MMOL/L (ref 133–144)
SODIUM BLD-SCNC: 140 MMOL/L (ref 133–144)
SODIUM BLD-SCNC: 141 MMOL/L (ref 133–144)
SODIUM BLD-SCNC: 143 MMOL/L (ref 133–144)
SODIUM BLD-SCNC: 147 MMOL/L (ref 133–144)
SODIUM BLD-SCNC: 148 MMOL/L (ref 133–144)
SODIUM BLD-SCNC: 149 MMOL/L (ref 133–144)
SODIUM SERPL-SCNC: 134 MMOL/L (ref 136–145)
SODIUM SERPL-SCNC: 136 MMOL/L (ref 136–145)
SODIUM SERPL-SCNC: 136 MMOL/L (ref 136–145)
SODIUM SERPL-SCNC: 137 MMOL/L (ref 136–145)
SODIUM SERPL-SCNC: 138 MMOL/L (ref 136–145)
SODIUM SERPL-SCNC: 139 MMOL/L (ref 136–145)
SODIUM SERPL-SCNC: 139 MMOL/L (ref 136–145)
SODIUM SERPL-SCNC: 140 MMOL/L (ref 136–145)
SODIUM SERPL-SCNC: 141 MMOL/L (ref 136–145)
SODIUM SERPL-SCNC: 142 MMOL/L (ref 136–145)
SODIUM SERPL-SCNC: 142 MMOL/L (ref 136–145)
SODIUM SERPL-SCNC: 143 MMOL/L (ref 136–145)
SODIUM SERPL-SCNC: 145 MMOL/L (ref 136–145)
SODIUM SERPL-SCNC: 146 MMOL/L (ref 136–145)
SODIUM SERPL-SCNC: 147 MMOL/L (ref 136–145)
SODIUM SERPL-SCNC: 148 MMOL/L (ref 136–145)
SODIUM SERPL-SCNC: 149 MMOL/L (ref 136–145)
SODIUM SERPL-SCNC: 151 MMOL/L (ref 136–145)
SODIUM SERPL-SCNC: 152 MMOL/L (ref 136–145)
SODIUM SERPL-SCNC: 161 MMOL/L (ref 136–145)
SP GR UR STRIP: 1.01 (ref 1–1.03)
SP GR UR STRIP: 1.02 (ref 1–1.03)
SPECIMEN EXPIRATION DATE: NORMAL
SYSTOLIC BLOOD PRESSURE - MUSE: NORMAL MMHG
T AXIS - MUSE: 102 DEGREES
T AXIS - MUSE: 103 DEGREES
T AXIS - MUSE: 104 DEGREES
T AXIS - MUSE: 125 DEGREES
T AXIS - MUSE: 61 DEGREES
T AXIS - MUSE: 73 DEGREES
T AXIS - MUSE: 83 DEGREES
T AXIS - MUSE: 91 DEGREES
T AXIS - MUSE: 91 DEGREES
T AXIS - MUSE: 92 DEGREES
T AXIS - MUSE: 98 DEGREES
T AXIS - MUSE: 98 DEGREES
T AXIS - MUSE: 99 DEGREES
T AXIS - MUSE: 99 DEGREES
TEMAZEPAM SERPL-MCNC: NEGATIVE NG/ML
TEMAZEPAM SERPL-MCNC: NEGATIVE NG/ML
TRAMADOL BLD-MCNC: NEGATIVE NG/ML
TRIAZOLAM SPEC-MCNC: NEGATIVE NG/ML
TRIAZOLAM SPEC-MCNC: NEGATIVE NG/ML
TRIGL SERPL-MCNC: 134 MG/DL
TRIGL SERPL-MCNC: 135 MG/DL
TRIGL SERPL-MCNC: 72 MG/DL
TRIGL SERPL-MCNC: 72 MG/DL
TROPONIN T SERPL HS-MCNC: 1175 NG/L
TROPONIN T SERPL HS-MCNC: 1228 NG/L
TROPONIN T SERPL HS-MCNC: 1246 NG/L
TROPONIN T SERPL HS-MCNC: 1266 NG/L
TROPONIN T SERPL HS-MCNC: 1295 NG/L
TROPONIN T SERPL HS-MCNC: 1322 NG/L
TROPONIN T SERPL HS-MCNC: 1422 NG/L
TROPONIN T SERPL HS-MCNC: 1467 NG/L
TROPONIN T SERPL HS-MCNC: 1569 NG/L
TROPONIN T SERPL HS-MCNC: 1595 NG/L
TROPONIN T SERPL HS-MCNC: 1596 NG/L
TROPONIN T SERPL HS-MCNC: 1636 NG/L
TROPONIN T SERPL HS-MCNC: 1721 NG/L
TROPONIN T SERPL HS-MCNC: 1734 NG/L
TROPONIN T SERPL HS-MCNC: 1765 NG/L
TROPONIN T SERPL HS-MCNC: 1788 NG/L
TROPONIN T SERPL HS-MCNC: 1874 NG/L
TROPONIN T SERPL HS-MCNC: 1935 NG/L
TROPONIN T SERPL HS-MCNC: 1941 NG/L
TROPONIN T SERPL HS-MCNC: 1960 NG/L
TROPONIN T SERPL HS-MCNC: 1995 NG/L
TROPONIN T SERPL HS-MCNC: 2012 NG/L
TROPONIN T SERPL HS-MCNC: 2015 NG/L
TROPONIN T SERPL HS-MCNC: 2057 NG/L
TROPONIN T SERPL HS-MCNC: 2064 NG/L
TROPONIN T SERPL HS-MCNC: 2067 NG/L
TROPONIN T SERPL HS-MCNC: 2110 NG/L
TROPONIN T SERPL HS-MCNC: 2126 NG/L
TROPONIN T SERPL HS-MCNC: 2169 NG/L
TROPONIN T SERPL HS-MCNC: 2206 NG/L
TROPONIN T SERPL HS-MCNC: 2268 NG/L
TROPONIN T SERPL HS-MCNC: 980 NG/L
UFH PPP CHRO-ACNC: 0.1 IU/ML
UFH PPP CHRO-ACNC: 0.11 IU/ML
UFH PPP CHRO-ACNC: 0.13 IU/ML
UFH PPP CHRO-ACNC: 0.13 IU/ML
UFH PPP CHRO-ACNC: 0.14 IU/ML
UFH PPP CHRO-ACNC: 0.14 IU/ML
UFH PPP CHRO-ACNC: 0.25 IU/ML
UFH PPP CHRO-ACNC: 0.29 IU/ML
UFH PPP CHRO-ACNC: 0.37 IU/ML
UFH PPP CHRO-ACNC: 0.37 IU/ML
UFH PPP CHRO-ACNC: 0.43 IU/ML
UFH PPP CHRO-ACNC: 0.45 IU/ML
UFH PPP CHRO-ACNC: 0.47 IU/ML
UFH PPP CHRO-ACNC: 0.47 IU/ML
UFH PPP CHRO-ACNC: 0.6 IU/ML
UFH PPP CHRO-ACNC: 0.61 IU/ML
UFH PPP CHRO-ACNC: 0.61 IU/ML
UFH PPP CHRO-ACNC: 0.64 IU/ML
UFH PPP CHRO-ACNC: 0.65 IU/ML
UFH PPP CHRO-ACNC: 0.65 IU/ML
UFH PPP CHRO-ACNC: 0.66 IU/ML
UFH PPP CHRO-ACNC: 0.73 IU/ML
UFH PPP CHRO-ACNC: 0.74 IU/ML
UFH PPP CHRO-ACNC: 0.75 IU/ML
UFH PPP CHRO-ACNC: 0.79 IU/ML
UFH PPP CHRO-ACNC: 0.83 IU/ML
UFH PPP CHRO-ACNC: <0.1 IU/ML
UFH PPP CHRO-ACNC: >1.1 IU/ML
UNIT ABO/RH: NORMAL
UNIT NUMBER: NORMAL
UNIT STATUS: NORMAL
UNIT TYPE ISBT: 1700
UNIT TYPE ISBT: 7300
UROBILINOGEN UR STRIP-MCNC: NORMAL MG/DL
UROBILINOGEN UR STRIP-MCNC: NORMAL MG/DL
VALPROATE FREE MFR SERPL: 84 %
VALPROATE FREE SERPL-MCNC: 31 UG/ML
VALPROATE SERPL-MCNC: 37 UG/ML
VENTRICULAR RATE- MUSE: 56 BPM
VENTRICULAR RATE- MUSE: 63 BPM
VENTRICULAR RATE- MUSE: 63 BPM
VENTRICULAR RATE- MUSE: 67 BPM
VENTRICULAR RATE- MUSE: 68 BPM
VENTRICULAR RATE- MUSE: 68 BPM
VENTRICULAR RATE- MUSE: 70 BPM
VENTRICULAR RATE- MUSE: 74 BPM
VENTRICULAR RATE- MUSE: 77 BPM
VENTRICULAR RATE- MUSE: 78 BPM
VENTRICULAR RATE- MUSE: 80 BPM
VENTRICULAR RATE- MUSE: 86 BPM
VENTRICULAR RATE- MUSE: 92 BPM
VENTRICULAR RATE- MUSE: 95 BPM
WBC # BLD AUTO: 16.6 10E3/UL (ref 4–11)
WBC # BLD AUTO: 17.4 10E3/UL (ref 4–11)
WBC # BLD AUTO: 18.3 10E3/UL (ref 4–11)
WBC # BLD AUTO: 18.5 10E3/UL (ref 4–11)
WBC # BLD AUTO: 18.8 10E3/UL (ref 4–11)
WBC # BLD AUTO: 19.3 10E3/UL (ref 4–11)
WBC # BLD AUTO: 19.6 10E3/UL (ref 4–11)
WBC # BLD AUTO: 19.7 10E3/UL (ref 4–11)
WBC # BLD AUTO: 20 10E3/UL (ref 4–11)
WBC # BLD AUTO: 20 10E3/UL (ref 4–11)
WBC # BLD AUTO: 20.3 10E3/UL (ref 4–11)
WBC # BLD AUTO: 21 10E3/UL (ref 4–11)
WBC # BLD AUTO: 21.9 10E3/UL (ref 4–11)
WBC # BLD AUTO: 22.3 10E3/UL (ref 4–11)
WBC # BLD AUTO: 22.5 10E3/UL (ref 4–11)
WBC # BLD AUTO: 22.6 10E3/UL (ref 4–11)
WBC # BLD AUTO: 22.8 10E3/UL (ref 4–11)
WBC # BLD AUTO: 23.1 10E3/UL (ref 4–11)
WBC # BLD AUTO: 23.3 10E3/UL (ref 4–11)
WBC # BLD AUTO: 23.4 10E3/UL (ref 4–11)
WBC # BLD AUTO: 23.7 10E3/UL (ref 4–11)
WBC # BLD AUTO: 24.5 10E3/UL (ref 4–11)
WBC # BLD AUTO: 25.2 10E3/UL (ref 4–11)
WBC # BLD AUTO: 25.3 10E3/UL (ref 4–11)
WBC # BLD AUTO: 25.7 10E3/UL (ref 4–11)
WBC # BLD AUTO: 25.8 10E3/UL (ref 4–11)
WBC # BLD AUTO: 25.8 10E3/UL (ref 4–11)
WBC # BLD AUTO: 25.9 10E3/UL (ref 4–11)
WBC # BLD AUTO: 26.4 10E3/UL (ref 4–11)
WBC # BLD AUTO: 27.1 10E3/UL (ref 4–11)
WBC # BLD AUTO: 27.2 10E3/UL (ref 4–11)
WBC # BLD AUTO: 27.4 10E3/UL (ref 4–11)
WBC # BLD AUTO: 27.6 10E3/UL (ref 4–11)
WBC # BLD AUTO: 27.7 10E3/UL (ref 4–11)
WBC # BLD AUTO: 28 10E3/UL (ref 4–11)
WBC # BLD AUTO: 28.4 10E3/UL (ref 4–11)
WBC # BLD AUTO: 29.3 10E3/UL (ref 4–11)
WBC # BLD AUTO: 29.9 10E3/UL (ref 4–11)
WBC # BLD AUTO: 32.6 10E3/UL (ref 4–11)
WBC # BLD AUTO: 34.5 10E3/UL (ref 4–11)
WBC # BLD AUTO: 35.5 10E3/UL (ref 4–11)
WBC # BLD AUTO: 40.8 10E3/UL (ref 4–11)
WBC # BLD AUTO: 41.3 10E3/UL (ref 4–11)
WBC # BLD AUTO: 41.4 10E3/UL (ref 4–11)
WBC URINE: 24 /HPF
WBC URINE: <1 /HPF

## 2022-01-01 PROCEDURE — 85730 THROMBOPLASTIN TIME PARTIAL: CPT | Performed by: EMERGENCY MEDICINE

## 2022-01-01 PROCEDURE — 250N000011 HC RX IP 250 OP 636: Performed by: INTERNAL MEDICINE

## 2022-01-01 PROCEDURE — 93880 EXTRACRANIAL BILAT STUDY: CPT

## 2022-01-01 PROCEDURE — 85730 THROMBOPLASTIN TIME PARTIAL: CPT

## 2022-01-01 PROCEDURE — C9113 INJ PANTOPRAZOLE SODIUM, VIA: HCPCS | Performed by: INTERNAL MEDICINE

## 2022-01-01 PROCEDURE — 85379 FIBRIN DEGRADATION QUANT: CPT | Performed by: STUDENT IN AN ORGANIZED HEALTH CARE EDUCATION/TRAINING PROGRAM

## 2022-01-01 PROCEDURE — 94003 VENT MGMT INPAT SUBQ DAY: CPT

## 2022-01-01 PROCEDURE — 33949 ECMO/ECLS DAILY MGMT ARTERY: CPT | Performed by: STUDENT IN AN ORGANIZED HEALTH CARE EDUCATION/TRAINING PROGRAM

## 2022-01-01 PROCEDURE — 85520 HEPARIN ASSAY: CPT | Performed by: NURSE PRACTITIONER

## 2022-01-01 PROCEDURE — 33967 INSERT I-AORT PERCUT DEVICE: CPT | Mod: GC | Performed by: INTERNAL MEDICINE

## 2022-01-01 PROCEDURE — 93005 ELECTROCARDIOGRAM TRACING: CPT

## 2022-01-01 PROCEDURE — 85014 HEMATOCRIT: CPT

## 2022-01-01 PROCEDURE — 86140 C-REACTIVE PROTEIN: CPT | Performed by: INTERNAL MEDICINE

## 2022-01-01 PROCEDURE — 250N000013 HC RX MED GY IP 250 OP 250 PS 637: Performed by: INTERNAL MEDICINE

## 2022-01-01 PROCEDURE — 95714 VEEG EA 12-26 HR UNMNTR: CPT

## 2022-01-01 PROCEDURE — 85027 COMPLETE CBC AUTOMATED: CPT

## 2022-01-01 PROCEDURE — 82330 ASSAY OF CALCIUM: CPT | Performed by: STUDENT IN AN ORGANIZED HEALTH CARE EDUCATION/TRAINING PROGRAM

## 2022-01-01 PROCEDURE — 83615 LACTATE (LD) (LDH) ENZYME: CPT | Performed by: STUDENT IN AN ORGANIZED HEALTH CARE EDUCATION/TRAINING PROGRAM

## 2022-01-01 PROCEDURE — 80307 DRUG TEST PRSMV CHEM ANLYZR: CPT | Performed by: STUDENT IN AN ORGANIZED HEALTH CARE EDUCATION/TRAINING PROGRAM

## 2022-01-01 PROCEDURE — 33967 INSERT I-AORT PERCUT DEVICE: CPT

## 2022-01-01 PROCEDURE — 250N000011 HC RX IP 250 OP 636: Performed by: STUDENT IN AN ORGANIZED HEALTH CARE EDUCATION/TRAINING PROGRAM

## 2022-01-01 PROCEDURE — 999N000077 HC STATISTIC INSERT IABP

## 2022-01-01 PROCEDURE — 99291 CRITICAL CARE FIRST HOUR: CPT | Performed by: PSYCHIATRY & NEUROLOGY

## 2022-01-01 PROCEDURE — 84450 TRANSFERASE (AST) (SGOT): CPT | Performed by: STUDENT IN AN ORGANIZED HEALTH CARE EDUCATION/TRAINING PROGRAM

## 2022-01-01 PROCEDURE — 71045 X-RAY EXAM CHEST 1 VIEW: CPT

## 2022-01-01 PROCEDURE — 87641 MR-STAPH DNA AMP PROBE: CPT | Performed by: STUDENT IN AN ORGANIZED HEALTH CARE EDUCATION/TRAINING PROGRAM

## 2022-01-01 PROCEDURE — 999N000157 HC STATISTIC RCP TIME EA 10 MIN

## 2022-01-01 PROCEDURE — 85300 ANTITHROMBIN III ACTIVITY: CPT | Performed by: STUDENT IN AN ORGANIZED HEALTH CARE EDUCATION/TRAINING PROGRAM

## 2022-01-01 PROCEDURE — 999N000065 XR CHEST PORT 1 VIEW

## 2022-01-01 PROCEDURE — 95720 EEG PHY/QHP EA INCR W/VEEG: CPT | Performed by: PSYCHIATRY & NEUROLOGY

## 2022-01-01 PROCEDURE — 86850 RBC ANTIBODY SCREEN: CPT | Performed by: INTERNAL MEDICINE

## 2022-01-01 PROCEDURE — 83735 ASSAY OF MAGNESIUM: CPT | Performed by: STUDENT IN AN ORGANIZED HEALTH CARE EDUCATION/TRAINING PROGRAM

## 2022-01-01 PROCEDURE — 82330 ASSAY OF CALCIUM: CPT

## 2022-01-01 PROCEDURE — C1887 CATHETER, GUIDING: HCPCS | Performed by: INTERNAL MEDICINE

## 2022-01-01 PROCEDURE — 250N000009 HC RX 250: Performed by: NURSE PRACTITIONER

## 2022-01-01 PROCEDURE — 83605 ASSAY OF LACTIC ACID: CPT | Performed by: STUDENT IN AN ORGANIZED HEALTH CARE EDUCATION/TRAINING PROGRAM

## 2022-01-01 PROCEDURE — 85027 COMPLETE CBC AUTOMATED: CPT | Performed by: STUDENT IN AN ORGANIZED HEALTH CARE EDUCATION/TRAINING PROGRAM

## 2022-01-01 PROCEDURE — 258N000003 HC RX IP 258 OP 636: Performed by: NURSE PRACTITIONER

## 2022-01-01 PROCEDURE — 82803 BLOOD GASES ANY COMBINATION: CPT

## 2022-01-01 PROCEDURE — 82805 BLOOD GASES W/O2 SATURATION: CPT

## 2022-01-01 PROCEDURE — 272N000057 HC CATH BALLOON IABP

## 2022-01-01 PROCEDURE — 82330 ASSAY OF CALCIUM: CPT | Performed by: INTERNAL MEDICINE

## 2022-01-01 PROCEDURE — 85027 COMPLETE CBC AUTOMATED: CPT | Performed by: INTERNAL MEDICINE

## 2022-01-01 PROCEDURE — 82805 BLOOD GASES W/O2 SATURATION: CPT | Performed by: INTERNAL MEDICINE

## 2022-01-01 PROCEDURE — 36415 COLL VENOUS BLD VENIPUNCTURE: CPT | Performed by: INTERNAL MEDICINE

## 2022-01-01 PROCEDURE — 82803 BLOOD GASES ANY COMBINATION: CPT | Performed by: STUDENT IN AN ORGANIZED HEALTH CARE EDUCATION/TRAINING PROGRAM

## 2022-01-01 PROCEDURE — 93880 EXTRACRANIAL BILAT STUDY: CPT | Mod: 26 | Performed by: RADIOLOGY

## 2022-01-01 PROCEDURE — 200N000002 HC R&B ICU UMMC

## 2022-01-01 PROCEDURE — 85520 HEPARIN ASSAY: CPT | Performed by: STUDENT IN AN ORGANIZED HEALTH CARE EDUCATION/TRAINING PROGRAM

## 2022-01-01 PROCEDURE — 87205 SMEAR GRAM STAIN: CPT | Performed by: STUDENT IN AN ORGANIZED HEALTH CARE EDUCATION/TRAINING PROGRAM

## 2022-01-01 PROCEDURE — 250N000013 HC RX MED GY IP 250 OP 250 PS 637

## 2022-01-01 PROCEDURE — 999N000026 HC STATISTIC CARDIOPULM RESUSCITATION

## 2022-01-01 PROCEDURE — 255N000002 HC RX 255 OP 636: Performed by: INTERNAL MEDICINE

## 2022-01-01 PROCEDURE — 999N000065 XR ABDOMEN PORT 1 VIEW

## 2022-01-01 PROCEDURE — 93308 TTE F-UP OR LMTD: CPT

## 2022-01-01 PROCEDURE — 84145 PROCALCITONIN (PCT): CPT | Performed by: INTERNAL MEDICINE

## 2022-01-01 PROCEDURE — 86923 COMPATIBILITY TEST ELECTRIC: CPT

## 2022-01-01 PROCEDURE — 250N000013 HC RX MED GY IP 250 OP 250 PS 637: Performed by: PHYSICIAN ASSISTANT

## 2022-01-01 PROCEDURE — 250N000024 HC ISOFLURANE, PER MIN: Performed by: SURGERY

## 2022-01-01 PROCEDURE — 99291 CRITICAL CARE FIRST HOUR: CPT | Performed by: STUDENT IN AN ORGANIZED HEALTH CARE EDUCATION/TRAINING PROGRAM

## 2022-01-01 PROCEDURE — 85396 CLOTTING ASSAY WHOLE BLOOD: CPT | Performed by: STUDENT IN AN ORGANIZED HEALTH CARE EDUCATION/TRAINING PROGRAM

## 2022-01-01 PROCEDURE — 85384 FIBRINOGEN ACTIVITY: CPT | Performed by: STUDENT IN AN ORGANIZED HEALTH CARE EDUCATION/TRAINING PROGRAM

## 2022-01-01 PROCEDURE — 85347 COAGULATION TIME ACTIVATED: CPT

## 2022-01-01 PROCEDURE — 99207 PR NO CHARGE LOS: CPT | Performed by: INTERNAL MEDICINE

## 2022-01-01 PROCEDURE — 272N000237 HC CARDIOHELP CIRCUIT

## 2022-01-01 PROCEDURE — 84484 ASSAY OF TROPONIN QUANT: CPT | Performed by: INTERNAL MEDICINE

## 2022-01-01 PROCEDURE — 272N000555 HC SENSOR NIRS OXIMETER, ADULT

## 2022-01-01 PROCEDURE — 85610 PROTHROMBIN TIME: CPT

## 2022-01-01 PROCEDURE — 250N000011 HC RX IP 250 OP 636: Performed by: PHYSICIAN ASSISTANT

## 2022-01-01 PROCEDURE — 86316 IMMUNOASSAY TUMOR OTHER: CPT | Performed by: INTERNAL MEDICINE

## 2022-01-01 PROCEDURE — 84100 ASSAY OF PHOSPHORUS: CPT | Performed by: INTERNAL MEDICINE

## 2022-01-01 PROCEDURE — 258N000003 HC RX IP 258 OP 636: Performed by: PHYSICIAN ASSISTANT

## 2022-01-01 PROCEDURE — 999N000075 HC STATISTIC IABP MONITORING

## 2022-01-01 PROCEDURE — 250N000013 HC RX MED GY IP 250 OP 250 PS 637: Performed by: NURSE PRACTITIONER

## 2022-01-01 PROCEDURE — 84155 ASSAY OF PROTEIN SERUM: CPT | Performed by: STUDENT IN AN ORGANIZED HEALTH CARE EDUCATION/TRAINING PROGRAM

## 2022-01-01 PROCEDURE — 82947 ASSAY GLUCOSE BLOOD QUANT: CPT | Performed by: STUDENT IN AN ORGANIZED HEALTH CARE EDUCATION/TRAINING PROGRAM

## 2022-01-01 PROCEDURE — 85730 THROMBOPLASTIN TIME PARTIAL: CPT | Performed by: STUDENT IN AN ORGANIZED HEALTH CARE EDUCATION/TRAINING PROGRAM

## 2022-01-01 PROCEDURE — 85520 HEPARIN ASSAY: CPT | Performed by: INTERNAL MEDICINE

## 2022-01-01 PROCEDURE — 80347 BENZODIAZEPINES 13 OR MORE: CPT | Performed by: INTERNAL MEDICINE

## 2022-01-01 PROCEDURE — 93010 ELECTROCARDIOGRAM REPORT: CPT | Mod: 76 | Performed by: INTERNAL MEDICINE

## 2022-01-01 PROCEDURE — 83735 ASSAY OF MAGNESIUM: CPT

## 2022-01-01 PROCEDURE — 82803 BLOOD GASES ANY COMBINATION: CPT | Performed by: PEDIATRICS

## 2022-01-01 PROCEDURE — 410N000003 HC PER-PERFUSION 1ST 30 MIN: Performed by: INTERNAL MEDICINE

## 2022-01-01 PROCEDURE — 80053 COMPREHEN METABOLIC PANEL: CPT | Performed by: STUDENT IN AN ORGANIZED HEALTH CARE EDUCATION/TRAINING PROGRAM

## 2022-01-01 PROCEDURE — 85610 PROTHROMBIN TIME: CPT | Performed by: STUDENT IN AN ORGANIZED HEALTH CARE EDUCATION/TRAINING PROGRAM

## 2022-01-01 PROCEDURE — 74176 CT ABD & PELVIS W/O CONTRAST: CPT | Mod: 26 | Performed by: RADIOLOGY

## 2022-01-01 PROCEDURE — 83735 ASSAY OF MAGNESIUM: CPT | Performed by: INTERNAL MEDICINE

## 2022-01-01 PROCEDURE — 250N000009 HC RX 250: Performed by: STUDENT IN AN ORGANIZED HEALTH CARE EDUCATION/TRAINING PROGRAM

## 2022-01-01 PROCEDURE — 92944: CPT | Mod: LC | Performed by: INTERNAL MEDICINE

## 2022-01-01 PROCEDURE — 80048 BASIC METABOLIC PNL TOTAL CA: CPT | Performed by: INTERNAL MEDICINE

## 2022-01-01 PROCEDURE — 93325 DOPPLER ECHO COLOR FLOW MAPG: CPT | Mod: 26 | Performed by: INTERNAL MEDICINE

## 2022-01-01 PROCEDURE — 250N000011 HC RX IP 250 OP 636: Performed by: NURSE PRACTITIONER

## 2022-01-01 PROCEDURE — 999N000185 HC STATISTIC TRANSPORT TIME EA 15 MIN

## 2022-01-01 PROCEDURE — 84132 ASSAY OF SERUM POTASSIUM: CPT | Performed by: STUDENT IN AN ORGANIZED HEALTH CARE EDUCATION/TRAINING PROGRAM

## 2022-01-01 PROCEDURE — 99153 MOD SED SAME PHYS/QHP EA: CPT | Performed by: INTERNAL MEDICINE

## 2022-01-01 PROCEDURE — 71045 X-RAY EXAM CHEST 1 VIEW: CPT | Mod: 26 | Performed by: RADIOLOGY

## 2022-01-01 PROCEDURE — 99223 1ST HOSP IP/OBS HIGH 75: CPT | Performed by: NURSE PRACTITIONER

## 2022-01-01 PROCEDURE — P9016 RBC LEUKOCYTES REDUCED: HCPCS | Performed by: INTERNAL MEDICINE

## 2022-01-01 PROCEDURE — 99283 EMERGENCY DEPT VISIT LOW MDM: CPT | Performed by: EMERGENCY MEDICINE

## 2022-01-01 PROCEDURE — 83051 HEMOGLOBIN PLASMA: CPT | Performed by: STUDENT IN AN ORGANIZED HEALTH CARE EDUCATION/TRAINING PROGRAM

## 2022-01-01 PROCEDURE — 272N000085 HC PACK CELL SAVER CSP: Performed by: SURGERY

## 2022-01-01 PROCEDURE — 83605 ASSAY OF LACTIC ACID: CPT | Performed by: INTERNAL MEDICINE

## 2022-01-01 PROCEDURE — 82533 TOTAL CORTISOL: CPT | Performed by: INTERNAL MEDICINE

## 2022-01-01 PROCEDURE — 70553 MRI BRAIN STEM W/O & W/DYE: CPT | Mod: 26 | Performed by: RADIOLOGY

## 2022-01-01 PROCEDURE — C1894 INTRO/SHEATH, NON-LASER: HCPCS | Performed by: INTERNAL MEDICINE

## 2022-01-01 PROCEDURE — 93321 DOPPLER ECHO F-UP/LMTD STD: CPT | Mod: 26 | Performed by: INTERNAL MEDICINE

## 2022-01-01 PROCEDURE — 74018 RADEX ABDOMEN 1 VIEW: CPT | Mod: 26 | Performed by: RADIOLOGY

## 2022-01-01 PROCEDURE — 82040 ASSAY OF SERUM ALBUMIN: CPT | Performed by: STUDENT IN AN ORGANIZED HEALTH CARE EDUCATION/TRAINING PROGRAM

## 2022-01-01 PROCEDURE — 82805 BLOOD GASES W/O2 SATURATION: CPT | Performed by: STUDENT IN AN ORGANIZED HEALTH CARE EDUCATION/TRAINING PROGRAM

## 2022-01-01 PROCEDURE — 85396 CLOTTING ASSAY WHOLE BLOOD: CPT | Performed by: INTERNAL MEDICINE

## 2022-01-01 PROCEDURE — 3E043XZ INTRODUCTION OF VASOPRESSOR INTO CENTRAL VEIN, PERCUTANEOUS APPROACH: ICD-10-PCS | Performed by: STUDENT IN AN ORGANIZED HEALTH CARE EDUCATION/TRAINING PROGRAM

## 2022-01-01 PROCEDURE — 84155 ASSAY OF PROTEIN SERUM: CPT | Performed by: INTERNAL MEDICINE

## 2022-01-01 PROCEDURE — 999N000015 HC STATISTIC ARTERIAL MONITORING DAILY

## 2022-01-01 PROCEDURE — C1874 STENT, COATED/COV W/DEL SYS: HCPCS | Performed by: INTERNAL MEDICINE

## 2022-01-01 PROCEDURE — 99239 HOSP IP/OBS DSCHRG MGMT >30: CPT | Mod: 24 | Performed by: NURSE PRACTITIONER

## 2022-01-01 PROCEDURE — 272N000001 HC OR GENERAL SUPPLY STERILE: Performed by: INTERNAL MEDICINE

## 2022-01-01 PROCEDURE — U0005 INFEC AGEN DETEC AMPLI PROBE: HCPCS | Performed by: STUDENT IN AN ORGANIZED HEALTH CARE EDUCATION/TRAINING PROGRAM

## 2022-01-01 PROCEDURE — 84484 ASSAY OF TROPONIN QUANT: CPT | Performed by: STUDENT IN AN ORGANIZED HEALTH CARE EDUCATION/TRAINING PROGRAM

## 2022-01-01 PROCEDURE — 82810 BLOOD GASES O2 SAT ONLY: CPT

## 2022-01-01 PROCEDURE — 84145 PROCALCITONIN (PCT): CPT | Performed by: STUDENT IN AN ORGANIZED HEALTH CARE EDUCATION/TRAINING PROGRAM

## 2022-01-01 PROCEDURE — 250N000011 HC RX IP 250 OP 636

## 2022-01-01 PROCEDURE — 09JK8ZZ INSPECTION OF NASAL MUCOSA AND SOFT TISSUE, VIA NATURAL OR ARTIFICIAL OPENING ENDOSCOPIC: ICD-10-PCS | Performed by: PHYSICIAN ASSISTANT

## 2022-01-01 PROCEDURE — 80053 COMPREHEN METABOLIC PANEL: CPT | Performed by: INTERNAL MEDICINE

## 2022-01-01 PROCEDURE — 87040 BLOOD CULTURE FOR BACTERIA: CPT | Performed by: INTERNAL MEDICINE

## 2022-01-01 PROCEDURE — 250N000009 HC RX 250: Performed by: ANESTHESIOLOGY

## 2022-01-01 PROCEDURE — C1725 CATH, TRANSLUMIN NON-LASER: HCPCS | Performed by: INTERNAL MEDICINE

## 2022-01-01 PROCEDURE — 99232 SBSQ HOSP IP/OBS MODERATE 35: CPT | Mod: 24 | Performed by: STUDENT IN AN ORGANIZED HEALTH CARE EDUCATION/TRAINING PROGRAM

## 2022-01-01 PROCEDURE — 85652 RBC SED RATE AUTOMATED: CPT | Performed by: INTERNAL MEDICINE

## 2022-01-01 PROCEDURE — P9016 RBC LEUKOCYTES REDUCED: HCPCS

## 2022-01-01 PROCEDURE — 85730 THROMBOPLASTIN TIME PARTIAL: CPT | Performed by: NURSE PRACTITIONER

## 2022-01-01 PROCEDURE — 99291 CRITICAL CARE FIRST HOUR: CPT | Mod: 25 | Performed by: STUDENT IN AN ORGANIZED HEALTH CARE EDUCATION/TRAINING PROGRAM

## 2022-01-01 PROCEDURE — 85610 PROTHROMBIN TIME: CPT | Performed by: EMERGENCY MEDICINE

## 2022-01-01 PROCEDURE — 86140 C-REACTIVE PROTEIN: CPT | Performed by: STUDENT IN AN ORGANIZED HEALTH CARE EDUCATION/TRAINING PROGRAM

## 2022-01-01 PROCEDURE — 84100 ASSAY OF PHOSPHORUS: CPT | Performed by: STUDENT IN AN ORGANIZED HEALTH CARE EDUCATION/TRAINING PROGRAM

## 2022-01-01 PROCEDURE — 999N000063 XR ABDOMEN PORT 1 VIEW

## 2022-01-01 PROCEDURE — 250N000013 HC RX MED GY IP 250 OP 250 PS 637: Performed by: STUDENT IN AN ORGANIZED HEALTH CARE EDUCATION/TRAINING PROGRAM

## 2022-01-01 PROCEDURE — 85652 RBC SED RATE AUTOMATED: CPT | Performed by: STUDENT IN AN ORGANIZED HEALTH CARE EDUCATION/TRAINING PROGRAM

## 2022-01-01 PROCEDURE — 85018 HEMOGLOBIN: CPT

## 2022-01-01 PROCEDURE — 250N000009 HC RX 250

## 2022-01-01 PROCEDURE — 258N000003 HC RX IP 258 OP 636

## 2022-01-01 PROCEDURE — 82040 ASSAY OF SERUM ALBUMIN: CPT

## 2022-01-01 PROCEDURE — 86923 COMPATIBILITY TEST ELECTRIC: CPT | Performed by: INTERNAL MEDICINE

## 2022-01-01 PROCEDURE — 82533 TOTAL CORTISOL: CPT | Performed by: STUDENT IN AN ORGANIZED HEALTH CARE EDUCATION/TRAINING PROGRAM

## 2022-01-01 PROCEDURE — 93925 LOWER EXTREMITY STUDY: CPT | Mod: 26 | Performed by: RADIOLOGY

## 2022-01-01 PROCEDURE — 71045 X-RAY EXAM CHEST 1 VIEW: CPT | Mod: 26 | Performed by: STUDENT IN AN ORGANIZED HEALTH CARE EDUCATION/TRAINING PROGRAM

## 2022-01-01 PROCEDURE — 83036 HEMOGLOBIN GLYCOSYLATED A1C: CPT | Performed by: STUDENT IN AN ORGANIZED HEALTH CARE EDUCATION/TRAINING PROGRAM

## 2022-01-01 PROCEDURE — 82040 ASSAY OF SERUM ALBUMIN: CPT | Performed by: INTERNAL MEDICINE

## 2022-01-01 PROCEDURE — 99152 MOD SED SAME PHYS/QHP 5/>YRS: CPT | Performed by: INTERNAL MEDICINE

## 2022-01-01 PROCEDURE — B2111ZZ FLUOROSCOPY OF MULTIPLE CORONARY ARTERIES USING LOW OSMOLAR CONTRAST: ICD-10-PCS | Performed by: INTERNAL MEDICINE

## 2022-01-01 PROCEDURE — 33952 ECMO/ECLS INSJ PRPH CANNULA: CPT | Mod: GC | Performed by: INTERNAL MEDICINE

## 2022-01-01 PROCEDURE — 82947 ASSAY GLUCOSE BLOOD QUANT: CPT | Performed by: INTERNAL MEDICINE

## 2022-01-01 PROCEDURE — 99292 CRITICAL CARE ADDL 30 MIN: CPT | Performed by: STUDENT IN AN ORGANIZED HEALTH CARE EDUCATION/TRAINING PROGRAM

## 2022-01-01 PROCEDURE — 86901 BLOOD TYPING SEROLOGIC RH(D): CPT | Performed by: INTERNAL MEDICINE

## 2022-01-01 PROCEDURE — 250N000011 HC RX IP 250 OP 636: Performed by: ANESTHESIOLOGY

## 2022-01-01 PROCEDURE — 258N000003 HC RX IP 258 OP 636: Performed by: INTERNAL MEDICINE

## 2022-01-01 PROCEDURE — 87040 BLOOD CULTURE FOR BACTERIA: CPT | Performed by: STUDENT IN AN ORGANIZED HEALTH CARE EDUCATION/TRAINING PROGRAM

## 2022-01-01 PROCEDURE — 33984 ECMO/ECLS RMVL PRPH CANNULA: CPT | Mod: 51 | Performed by: SURGERY

## 2022-01-01 PROCEDURE — 84478 ASSAY OF TRIGLYCERIDES: CPT | Performed by: INTERNAL MEDICINE

## 2022-01-01 PROCEDURE — 93308 TTE F-UP OR LMTD: CPT | Mod: 26 | Performed by: INTERNAL MEDICINE

## 2022-01-01 PROCEDURE — 82947 ASSAY GLUCOSE BLOOD QUANT: CPT

## 2022-01-01 PROCEDURE — 06QM0ZZ REPAIR RIGHT FEMORAL VEIN, OPEN APPROACH: ICD-10-PCS | Performed by: SURGERY

## 2022-01-01 PROCEDURE — 258N000003 HC RX IP 258 OP 636: Performed by: STUDENT IN AN ORGANIZED HEALTH CARE EDUCATION/TRAINING PROGRAM

## 2022-01-01 PROCEDURE — 95718 EEG PHYS/QHP 2-12 HR W/VEEG: CPT | Performed by: PSYCHIATRY & NEUROLOGY

## 2022-01-01 PROCEDURE — C1769 GUIDE WIRE: HCPCS | Performed by: INTERNAL MEDICINE

## 2022-01-01 PROCEDURE — 33952 ECMO/ECLS INSJ PRPH CANNULA: CPT | Performed by: INTERNAL MEDICINE

## 2022-01-01 PROCEDURE — 255N000002 HC RX 255 OP 636: Performed by: STUDENT IN AN ORGANIZED HEALTH CARE EDUCATION/TRAINING PROGRAM

## 2022-01-01 PROCEDURE — 93010 ELECTROCARDIOGRAM REPORT: CPT | Mod: 59 | Performed by: INTERNAL MEDICINE

## 2022-01-01 PROCEDURE — 80176 ASSAY OF LIDOCAINE: CPT | Performed by: INTERNAL MEDICINE

## 2022-01-01 PROCEDURE — 70450 CT HEAD/BRAIN W/O DYE: CPT | Mod: 26 | Performed by: RADIOLOGY

## 2022-01-01 PROCEDURE — 81001 URINALYSIS AUTO W/SCOPE: CPT | Performed by: STUDENT IN AN ORGANIZED HEALTH CARE EDUCATION/TRAINING PROGRAM

## 2022-01-01 PROCEDURE — C9803 HOPD COVID-19 SPEC COLLECT: HCPCS | Performed by: EMERGENCY MEDICINE

## 2022-01-01 PROCEDURE — 80307 DRUG TEST PRSMV CHEM ANLYZR: CPT | Performed by: INTERNAL MEDICINE

## 2022-01-01 PROCEDURE — 999N000128 HC STATISTIC PERIPHERAL IV START W/O US GUIDANCE

## 2022-01-01 PROCEDURE — A7035 POS AIRWAY PRESS HEADGEAR: HCPCS

## 2022-01-01 PROCEDURE — 70450 CT HEAD/BRAIN W/O DYE: CPT

## 2022-01-01 PROCEDURE — 84155 ASSAY OF PROTEIN SERUM: CPT

## 2022-01-01 PROCEDURE — 86901 BLOOD TYPING SEROLOGIC RH(D): CPT | Performed by: STUDENT IN AN ORGANIZED HEALTH CARE EDUCATION/TRAINING PROGRAM

## 2022-01-01 PROCEDURE — 36415 COLL VENOUS BLD VENIPUNCTURE: CPT | Performed by: STUDENT IN AN ORGANIZED HEALTH CARE EDUCATION/TRAINING PROGRAM

## 2022-01-01 PROCEDURE — 258N000003 HC RX IP 258 OP 636: Performed by: NURSE ANESTHETIST, CERTIFIED REGISTERED

## 2022-01-01 PROCEDURE — 99291 CRITICAL CARE FIRST HOUR: CPT | Mod: 25 | Performed by: PSYCHIATRY & NEUROLOGY

## 2022-01-01 PROCEDURE — 80053 COMPREHEN METABOLIC PANEL: CPT

## 2022-01-01 PROCEDURE — 74176 CT ABD & PELVIS W/O CONTRAST: CPT | Mod: 26 | Performed by: STUDENT IN AN ORGANIZED HEALTH CARE EDUCATION/TRAINING PROGRAM

## 2022-01-01 PROCEDURE — 250N000009 HC RX 250: Performed by: INTERNAL MEDICINE

## 2022-01-01 PROCEDURE — 86923 COMPATIBILITY TEST ELECTRIC: CPT | Performed by: NURSE PRACTITIONER

## 2022-01-01 PROCEDURE — 370N000017 HC ANESTHESIA TECHNICAL FEE, PER MIN: Performed by: SURGERY

## 2022-01-01 PROCEDURE — 82805 BLOOD GASES W/O2 SATURATION: CPT | Performed by: PHYSICIAN ASSISTANT

## 2022-01-01 PROCEDURE — 5A02210 ASSISTANCE WITH CARDIAC OUTPUT USING BALLOON PUMP, CONTINUOUS: ICD-10-PCS | Performed by: INTERNAL MEDICINE

## 2022-01-01 PROCEDURE — 71250 CT THORAX DX C-: CPT | Mod: 26 | Performed by: STUDENT IN AN ORGANIZED HEALTH CARE EDUCATION/TRAINING PROGRAM

## 2022-01-01 PROCEDURE — 94002 VENT MGMT INPAT INIT DAY: CPT

## 2022-01-01 PROCEDURE — 74018 RADEX ABDOMEN 1 VIEW: CPT | Mod: 26 | Performed by: STUDENT IN AN ORGANIZED HEALTH CARE EDUCATION/TRAINING PROGRAM

## 2022-01-01 PROCEDURE — 250N000011 HC RX IP 250 OP 636: Performed by: SURGERY

## 2022-01-01 PROCEDURE — 999N000035 HC STATISTIC CODE BLUE NO ACCESS REQUIRED

## 2022-01-01 PROCEDURE — 80354 DRUG SCREENING FENTANYL: CPT | Performed by: INTERNAL MEDICINE

## 2022-01-01 PROCEDURE — C9460 INJECTION, CANGRELOR: HCPCS | Performed by: INTERNAL MEDICINE

## 2022-01-01 PROCEDURE — 370N000003 HC ANESTHESIA WARD SERVICE

## 2022-01-01 PROCEDURE — 81001 URINALYSIS AUTO W/SCOPE: CPT | Performed by: INTERNAL MEDICINE

## 2022-01-01 PROCEDURE — 83735 ASSAY OF MAGNESIUM: CPT | Performed by: EMERGENCY MEDICINE

## 2022-01-01 PROCEDURE — 272N000001 HC OR GENERAL SUPPLY STERILE: Performed by: SURGERY

## 2022-01-01 PROCEDURE — 33968 REMOVE AORTIC ASSIST DEVICE: CPT

## 2022-01-01 PROCEDURE — 250N000011 HC RX IP 250 OP 636: Performed by: NURSE ANESTHETIST, CERTIFIED REGISTERED

## 2022-01-01 PROCEDURE — 93454 CORONARY ARTERY ANGIO S&I: CPT | Mod: 26 | Performed by: INTERNAL MEDICINE

## 2022-01-01 PROCEDURE — 86316 IMMUNOASSAY TUMOR OTHER: CPT | Performed by: STUDENT IN AN ORGANIZED HEALTH CARE EDUCATION/TRAINING PROGRAM

## 2022-01-01 PROCEDURE — 92929 PR PRQ TRLUML CORONARY BM STENT W/ANGIO ADDL ART/BRNCH: CPT | Mod: 59 | Performed by: INTERNAL MEDICINE

## 2022-01-01 PROCEDURE — 84100 ASSAY OF PHOSPHORUS: CPT | Performed by: NURSE PRACTITIONER

## 2022-01-01 PROCEDURE — 99285 EMERGENCY DEPT VISIT HI MDM: CPT | Mod: 25 | Performed by: EMERGENCY MEDICINE

## 2022-01-01 PROCEDURE — 70553 MRI BRAIN STEM W/O & W/DYE: CPT

## 2022-01-01 PROCEDURE — 71250 CT THORAX DX C-: CPT

## 2022-01-01 PROCEDURE — 82330 ASSAY OF CALCIUM: CPT | Performed by: NURSE PRACTITIONER

## 2022-01-01 PROCEDURE — 250N000009 HC RX 250: Performed by: PHYSICIAN ASSISTANT

## 2022-01-01 PROCEDURE — 87086 URINE CULTURE/COLONY COUNT: CPT | Performed by: STUDENT IN AN ORGANIZED HEALTH CARE EDUCATION/TRAINING PROGRAM

## 2022-01-01 PROCEDURE — 84484 ASSAY OF TROPONIN QUANT: CPT | Performed by: EMERGENCY MEDICINE

## 2022-01-01 PROCEDURE — 272N000002 HC OR SUPPLY OTHER OPNP: Performed by: INTERNAL MEDICINE

## 2022-01-01 PROCEDURE — 92928 PRQ TCAT PLMT NTRAC ST 1 LES: CPT | Mod: XS | Performed by: INTERNAL MEDICINE

## 2022-01-01 PROCEDURE — 85730 THROMBOPLASTIN TIME PARTIAL: CPT | Performed by: INTERNAL MEDICINE

## 2022-01-01 PROCEDURE — 250N000009 HC RX 250: Performed by: SURGERY

## 2022-01-01 PROCEDURE — 85610 PROTHROMBIN TIME: CPT | Performed by: INTERNAL MEDICINE

## 2022-01-01 PROCEDURE — 93321 DOPPLER ECHO F-UP/LMTD STD: CPT

## 2022-01-01 PROCEDURE — 5A1955Z RESPIRATORY VENTILATION, GREATER THAN 96 CONSECUTIVE HOURS: ICD-10-PCS | Performed by: STUDENT IN AN ORGANIZED HEALTH CARE EDUCATION/TRAINING PROGRAM

## 2022-01-01 PROCEDURE — 85520 HEPARIN ASSAY: CPT

## 2022-01-01 PROCEDURE — 33949 ECMO/ECLS DAILY MGMT ARTERY: CPT

## 2022-01-01 PROCEDURE — C1763 CONN TISS, NON-HUMAN: HCPCS | Performed by: SURGERY

## 2022-01-01 PROCEDURE — 027337Z DILATION OF CORONARY ARTERY, FOUR OR MORE ARTERIES WITH FOUR OR MORE DRUG-ELUTING INTRALUMINAL DEVICES, PERCUTANEOUS APPROACH: ICD-10-PCS | Performed by: INTERNAL MEDICINE

## 2022-01-01 PROCEDURE — 93306 TTE W/DOPPLER COMPLETE: CPT | Mod: 26 | Performed by: INTERNAL MEDICINE

## 2022-01-01 PROCEDURE — 84450 TRANSFERASE (AST) (SGOT): CPT

## 2022-01-01 PROCEDURE — A9585 GADOBUTROL INJECTION: HCPCS | Performed by: INTERNAL MEDICINE

## 2022-01-01 PROCEDURE — P9016 RBC LEUKOCYTES REDUCED: HCPCS | Performed by: STUDENT IN AN ORGANIZED HEALTH CARE EDUCATION/TRAINING PROGRAM

## 2022-01-01 PROCEDURE — 85384 FIBRINOGEN ACTIVITY: CPT | Performed by: INTERNAL MEDICINE

## 2022-01-01 PROCEDURE — 93454 CORONARY ARTERY ANGIO S&I: CPT | Performed by: INTERNAL MEDICINE

## 2022-01-01 PROCEDURE — 272N000088 HC PUMP APP ADULT PERFUSION: Performed by: INTERNAL MEDICINE

## 2022-01-01 PROCEDURE — 83735 ASSAY OF MAGNESIUM: CPT | Performed by: NURSE PRACTITIONER

## 2022-01-01 PROCEDURE — 87205 SMEAR GRAM STAIN: CPT | Performed by: INTERNAL MEDICINE

## 2022-01-01 PROCEDURE — 80061 LIPID PANEL: CPT | Performed by: PHYSICIAN ASSISTANT

## 2022-01-01 PROCEDURE — C9601 PERC DRUG-EL COR STENT BRAN: HCPCS | Performed by: INTERNAL MEDICINE

## 2022-01-01 PROCEDURE — 83615 LACTATE (LD) (LDH) ENZYME: CPT | Performed by: INTERNAL MEDICINE

## 2022-01-01 PROCEDURE — 027236Z DILATION OF CORONARY ARTERY, THREE ARTERIES WITH THREE DRUG-ELUTING INTRALUMINAL DEVICES, PERCUTANEOUS APPROACH: ICD-10-PCS | Performed by: INTERNAL MEDICINE

## 2022-01-01 PROCEDURE — C1757 CATH, THROMBECTOMY/EMBOLECT: HCPCS | Performed by: INTERNAL MEDICINE

## 2022-01-01 PROCEDURE — 36556 INSERT NON-TUNNEL CV CATH: CPT | Mod: GC | Performed by: INTERNAL MEDICINE

## 2022-01-01 PROCEDURE — 92941 PRQ TRLML REVSC TOT OCCL AMI: CPT | Mod: LD | Performed by: INTERNAL MEDICINE

## 2022-01-01 PROCEDURE — 85025 COMPLETE CBC W/AUTO DIFF WBC: CPT | Performed by: EMERGENCY MEDICINE

## 2022-01-01 PROCEDURE — 85048 AUTOMATED LEUKOCYTE COUNT: CPT

## 2022-01-01 PROCEDURE — 99207 PR SC NO CHARGE VISIT: CPT | Performed by: PSYCHIATRY & NEUROLOGY

## 2022-01-01 PROCEDURE — 99152 MOD SED SAME PHYS/QHP 5/>YRS: CPT | Mod: GC | Performed by: INTERNAL MEDICINE

## 2022-01-01 PROCEDURE — 999N000111 HC STATISTIC OT IP EVAL DEFER: Performed by: OCCUPATIONAL THERAPIST

## 2022-01-01 PROCEDURE — 84132 ASSAY OF SERUM POTASSIUM: CPT | Performed by: EMERGENCY MEDICINE

## 2022-01-01 PROCEDURE — 33947 ECMO/ECLS INITIATION ARTERY: CPT

## 2022-01-01 PROCEDURE — 93010 ELECTROCARDIOGRAM REPORT: CPT | Performed by: INTERNAL MEDICINE

## 2022-01-01 PROCEDURE — G0463 HOSPITAL OUTPT CLINIC VISIT: HCPCS

## 2022-01-01 PROCEDURE — 85014 HEMATOCRIT: CPT | Performed by: INTERNAL MEDICINE

## 2022-01-01 PROCEDURE — 250N000012 HC RX MED GY IP 250 OP 636 PS 637: Performed by: NURSE PRACTITIONER

## 2022-01-01 PROCEDURE — C9601 PERC DRUG-EL COR STENT BRAN: HCPCS | Mod: LC | Performed by: INTERNAL MEDICINE

## 2022-01-01 PROCEDURE — 360N000079 HC SURGERY LEVEL 6, PER MIN: Performed by: SURGERY

## 2022-01-01 PROCEDURE — 83690 ASSAY OF LIPASE: CPT | Performed by: NURSE PRACTITIONER

## 2022-01-01 PROCEDURE — C9600 PERC DRUG-EL COR STENT SING: HCPCS | Performed by: INTERNAL MEDICINE

## 2022-01-01 PROCEDURE — 92943 PRQ TRLUML REVSC CH OCC ANT: CPT | Mod: LC | Performed by: INTERNAL MEDICINE

## 2022-01-01 PROCEDURE — 33947 ECMO/ECLS INITIATION ARTERY: CPT | Mod: GC | Performed by: INTERNAL MEDICINE

## 2022-01-01 PROCEDURE — 250N000009 HC RX 250: Performed by: NURSE ANESTHETIST, CERTIFIED REGISTERED

## 2022-01-01 PROCEDURE — 99207 PR SC NO CHARGE VISIT: CPT | Performed by: INTERNAL MEDICINE

## 2022-01-01 PROCEDURE — 93925 LOWER EXTREMITY STUDY: CPT

## 2022-01-01 PROCEDURE — 85018 HEMOGLOBIN: CPT | Performed by: INTERNAL MEDICINE

## 2022-01-01 PROCEDURE — 99292 CRITICAL CARE ADDL 30 MIN: CPT | Mod: 25 | Performed by: STUDENT IN AN ORGANIZED HEALTH CARE EDUCATION/TRAINING PROGRAM

## 2022-01-01 PROCEDURE — 99233 SBSQ HOSP IP/OBS HIGH 50: CPT | Mod: GC | Performed by: HOSPITALIST

## 2022-01-01 PROCEDURE — 86923 COMPATIBILITY TEST ELECTRIC: CPT | Performed by: STUDENT IN AN ORGANIZED HEALTH CARE EDUCATION/TRAINING PROGRAM

## 2022-01-01 PROCEDURE — 82803 BLOOD GASES ANY COMBINATION: CPT | Performed by: INTERNAL MEDICINE

## 2022-01-01 PROCEDURE — 80347 BENZODIAZEPINES 13 OR MORE: CPT | Performed by: STUDENT IN AN ORGANIZED HEALTH CARE EDUCATION/TRAINING PROGRAM

## 2022-01-01 PROCEDURE — 80165 DIPROPYLACETIC ACID FREE: CPT | Performed by: INTERNAL MEDICINE

## 2022-01-01 PROCEDURE — C9606 PERC D-E COR REVASC W AMI S: HCPCS | Mod: LD | Performed by: INTERNAL MEDICINE

## 2022-01-01 PROCEDURE — 5A1522G EXTRACORPOREAL OXYGENATION, MEMBRANE, PERIPHERAL VENO-ARTERIAL: ICD-10-PCS | Performed by: INTERNAL MEDICINE

## 2022-01-01 PROCEDURE — 0CDXXZ0 EXTRACTION OF LOWER TOOTH, SINGLE, EXTERNAL APPROACH: ICD-10-PCS | Performed by: DENTIST

## 2022-01-01 PROCEDURE — P9045 ALBUMIN (HUMAN), 5%, 250 ML: HCPCS | Performed by: STUDENT IN AN ORGANIZED HEALTH CARE EDUCATION/TRAINING PROGRAM

## 2022-01-01 PROCEDURE — 82947 ASSAY GLUCOSE BLOOD QUANT: CPT | Performed by: EMERGENCY MEDICINE

## 2022-01-01 PROCEDURE — C9600 PERC DRUG-EL COR STENT SING: HCPCS | Mod: LC | Performed by: INTERNAL MEDICINE

## 2022-01-01 PROCEDURE — 04UK0KZ SUPPLEMENT RIGHT FEMORAL ARTERY WITH NONAUTOLOGOUS TISSUE SUBSTITUTE, OPEN APPROACH: ICD-10-PCS | Performed by: SURGERY

## 2022-01-01 PROCEDURE — 71250 CT THORAX DX C-: CPT | Mod: 26 | Performed by: RADIOLOGY

## 2022-01-01 PROCEDURE — 99223 1ST HOSP IP/OBS HIGH 75: CPT | Mod: 25 | Performed by: INTERNAL MEDICINE

## 2022-01-01 PROCEDURE — 99207 PR SC NO CHARGE VISIT: CPT | Performed by: STUDENT IN AN ORGANIZED HEALTH CARE EDUCATION/TRAINING PROGRAM

## 2022-01-01 PROCEDURE — 36415 COLL VENOUS BLD VENIPUNCTURE: CPT | Performed by: EMERGENCY MEDICINE

## 2022-01-01 PROCEDURE — 999N000208 ECHOCARDIOGRAM LIMITED

## 2022-01-01 PROCEDURE — 02C03ZZ EXTIRPATION OF MATTER FROM CORONARY ARTERY, ONE ARTERY, PERCUTANEOUS APPROACH: ICD-10-PCS | Performed by: INTERNAL MEDICINE

## 2022-01-01 PROCEDURE — 36556 INSERT NON-TUNNEL CV CATH: CPT | Performed by: INTERNAL MEDICINE

## 2022-01-01 PROCEDURE — U0005 INFEC AGEN DETEC AMPLI PROBE: HCPCS | Performed by: EMERGENCY MEDICINE

## 2022-01-01 PROCEDURE — 80354 DRUG SCREENING FENTANYL: CPT | Performed by: STUDENT IN AN ORGANIZED HEALTH CARE EDUCATION/TRAINING PROGRAM

## 2022-01-01 PROCEDURE — 999N000208 ECHOCARDIOGRAM COMPLETE

## 2022-01-01 PROCEDURE — 83605 ASSAY OF LACTIC ACID: CPT

## 2022-01-01 PROCEDURE — 35286 RPR BLVSL GRF OTH/TH VN LXTR: CPT | Mod: XU | Performed by: SURGERY

## 2022-01-01 PROCEDURE — C1751 CATH, INF, PER/CENT/MIDLINE: HCPCS | Performed by: INTERNAL MEDICINE

## 2022-01-01 PROCEDURE — 33967 INSERT I-AORT PERCUT DEVICE: CPT | Performed by: INTERNAL MEDICINE

## 2022-01-01 DEVICE — STENT RESOLUTE ONYX DE 2.7FR 2.00X22MM RONYX DE20022UX: Type: IMPLANTABLE DEVICE | Status: FUNCTIONAL

## 2022-01-01 DEVICE — STENT CORONARY DES SYNERGY XD MR US 2.75X20MM H7493941820270: Type: IMPLANTABLE DEVICE | Status: FUNCTIONAL

## 2022-01-01 DEVICE — IMPLANTABLE DEVICE: Type: IMPLANTABLE DEVICE | Status: FUNCTIONAL

## 2022-01-01 DEVICE — GRAFT PATCH VASC XENOSURE BIOLOGIC 0.8X08CM E0.8P8: Type: IMPLANTABLE DEVICE | Site: GROIN | Status: FUNCTIONAL

## 2022-01-01 DEVICE — STENT RESOLUTE ONYX DE 2.7FR 2.00X12MM RONYX DE20012UX: Type: IMPLANTABLE DEVICE | Status: FUNCTIONAL

## 2022-01-01 DEVICE — STENT CORONARY DES SYNERGY XD MR US 3.00X24MM H7493941824300: Type: IMPLANTABLE DEVICE | Status: FUNCTIONAL

## 2022-01-01 DEVICE — STENT CORONARY DES SYNERGY XD MR US 2.75X38MM H7493941838270: Type: IMPLANTABLE DEVICE | Status: FUNCTIONAL

## 2022-01-01 RX ORDER — MIDAZOLAM HCL IN 0.9 % NACL/PF 1 MG/ML
8 PLASTIC BAG, INJECTION (ML) INTRAVENOUS CONTINUOUS
Status: DISCONTINUED | OUTPATIENT
Start: 2022-01-01 | End: 2022-01-01

## 2022-01-01 RX ORDER — NALOXONE HYDROCHLORIDE 0.4 MG/ML
0.4 INJECTION, SOLUTION INTRAMUSCULAR; INTRAVENOUS; SUBCUTANEOUS
Status: CANCELLED | OUTPATIENT
Start: 2022-01-01 | End: 2022-01-01

## 2022-01-01 RX ORDER — AMIODARONE HYDROCHLORIDE 200 MG/1
400 TABLET ORAL DAILY
Status: DISCONTINUED | OUTPATIENT
Start: 2022-01-01 | End: 2022-01-01

## 2022-01-01 RX ORDER — ASPIRIN 81 MG/1
81 TABLET, CHEWABLE ORAL DAILY
Status: DISCONTINUED | OUTPATIENT
Start: 2022-01-01 | End: 2022-01-01

## 2022-01-01 RX ORDER — ASPIRIN 81 MG/1
81 TABLET ORAL DAILY
Status: DISCONTINUED | OUTPATIENT
Start: 2022-01-01 | End: 2022-01-01

## 2022-01-01 RX ORDER — PROPOFOL 10 MG/ML
25 INJECTION, EMULSION INTRAVENOUS CONTINUOUS
Status: DISCONTINUED | OUTPATIENT
Start: 2022-01-01 | End: 2022-01-01 | Stop reason: HOSPADM

## 2022-01-01 RX ORDER — POTASSIUM CHLORIDE 29.8 MG/ML
20 INJECTION INTRAVENOUS ONCE
Status: COMPLETED | OUTPATIENT
Start: 2022-01-01 | End: 2022-01-01

## 2022-01-01 RX ORDER — FUROSEMIDE 10 MG/ML
60 INJECTION INTRAMUSCULAR; INTRAVENOUS ONCE
Status: COMPLETED | OUTPATIENT
Start: 2022-01-01 | End: 2022-01-01

## 2022-01-01 RX ORDER — IOPAMIDOL 755 MG/ML
INJECTION, SOLUTION INTRAVASCULAR
Status: DISCONTINUED | OUTPATIENT
Start: 2022-01-01 | End: 2022-01-01 | Stop reason: HOSPADM

## 2022-01-01 RX ORDER — LIDOCAINE HYDROCHLORIDE ANHYDROUS AND DEXTROSE MONOHYDRATE .8; 5 G/100ML; G/100ML
1 INJECTION, SOLUTION INTRAVENOUS CONTINUOUS
Status: DISCONTINUED | OUTPATIENT
Start: 2022-01-01 | End: 2022-01-01

## 2022-01-01 RX ORDER — POTASSIUM CHLORIDE 7.45 MG/ML
10 INJECTION INTRAVENOUS
Status: DISCONTINUED | OUTPATIENT
Start: 2022-01-01 | End: 2022-01-01

## 2022-01-01 RX ORDER — PHENYLEPHRINE HCL IN 0.9% NACL 50MG/250ML
.5-6 PLASTIC BAG, INJECTION (ML) INTRAVENOUS CONTINUOUS
Status: DISCONTINUED | OUTPATIENT
Start: 2022-01-01 | End: 2022-01-01

## 2022-01-01 RX ORDER — MIDAZOLAM HCL IN 0.9 % NACL/PF 1 MG/ML
1-8 PLASTIC BAG, INJECTION (ML) INTRAVENOUS CONTINUOUS
Status: DISCONTINUED | OUTPATIENT
Start: 2022-01-01 | End: 2022-01-01

## 2022-01-01 RX ORDER — ASPIRIN 81 MG/1
81 TABLET, CHEWABLE ORAL ONCE
Status: DISCONTINUED | OUTPATIENT
Start: 2022-01-01 | End: 2022-01-01

## 2022-01-01 RX ORDER — NOREPINEPHRINE BITARTRATE 0.06 MG/ML
.01-.6 INJECTION, SOLUTION INTRAVENOUS CONTINUOUS
Status: DISCONTINUED | OUTPATIENT
Start: 2022-01-01 | End: 2022-01-01

## 2022-01-01 RX ORDER — MAGNESIUM SULFATE HEPTAHYDRATE 40 MG/ML
2 INJECTION, SOLUTION INTRAVENOUS ONCE
Status: COMPLETED | OUTPATIENT
Start: 2022-01-01 | End: 2022-01-01

## 2022-01-01 RX ORDER — HEPARIN SODIUM 10000 [USP'U]/100ML
0-5000 INJECTION, SOLUTION INTRAVENOUS CONTINUOUS
Status: DISCONTINUED | OUTPATIENT
Start: 2022-01-01 | End: 2022-01-01

## 2022-01-01 RX ORDER — LEVETIRACETAM 5 MG/ML
500 INJECTION INTRAVASCULAR ONCE
Status: COMPLETED | OUTPATIENT
Start: 2022-01-01 | End: 2022-01-01

## 2022-01-01 RX ORDER — SODIUM CHLORIDE 9 MG/ML
INJECTION, SOLUTION INTRAVENOUS CONTINUOUS
Status: CANCELLED | OUTPATIENT
Start: 2022-01-01

## 2022-01-01 RX ORDER — CARBOXYMETHYLCELLULOSE SODIUM 5 MG/ML
1 SOLUTION/ DROPS OPHTHALMIC EVERY 8 HOURS PRN
Status: DISCONTINUED | OUTPATIENT
Start: 2022-01-01 | End: 2022-01-01 | Stop reason: HOSPADM

## 2022-01-01 RX ORDER — ACETAZOLAMIDE 500 MG/5ML
500 INJECTION, POWDER, LYOPHILIZED, FOR SOLUTION INTRAVENOUS ONCE
Status: COMPLETED | OUTPATIENT
Start: 2022-01-01 | End: 2022-01-01

## 2022-01-01 RX ORDER — POTASSIUM CHLORIDE 20MEQ/15ML
20 LIQUID (ML) ORAL ONCE
Status: COMPLETED | OUTPATIENT
Start: 2022-01-01 | End: 2022-01-01

## 2022-01-01 RX ORDER — LIDOCAINE 40 MG/G
CREAM TOPICAL
Status: DISCONTINUED | OUTPATIENT
Start: 2022-01-01 | End: 2022-01-01

## 2022-01-01 RX ORDER — NALOXONE HYDROCHLORIDE 0.4 MG/ML
0.2 INJECTION, SOLUTION INTRAMUSCULAR; INTRAVENOUS; SUBCUTANEOUS
Status: ACTIVE | OUTPATIENT
Start: 2022-01-01 | End: 2022-01-01

## 2022-01-01 RX ORDER — FENTANYL CITRATE 50 UG/ML
25 INJECTION, SOLUTION INTRAMUSCULAR; INTRAVENOUS
Status: CANCELLED | OUTPATIENT
Start: 2022-01-01

## 2022-01-01 RX ORDER — NOREPINEPHRINE BITARTRATE 0.06 MG/ML
INJECTION, SOLUTION INTRAVENOUS
Status: COMPLETED
Start: 2022-01-01 | End: 2022-01-01

## 2022-01-01 RX ORDER — DEXTROSE MONOHYDRATE 25 G/50ML
25-50 INJECTION, SOLUTION INTRAVENOUS
Status: DISCONTINUED | OUTPATIENT
Start: 2022-01-01 | End: 2022-01-01 | Stop reason: HOSPADM

## 2022-01-01 RX ORDER — POTASSIUM CHLORIDE 29.8 MG/ML
20 INJECTION INTRAVENOUS
Status: DISCONTINUED | OUTPATIENT
Start: 2022-01-01 | End: 2022-01-01

## 2022-01-01 RX ORDER — HEPARIN SODIUM 1000 [USP'U]/ML
INJECTION, SOLUTION INTRAVENOUS; SUBCUTANEOUS
Status: DISCONTINUED | OUTPATIENT
Start: 2022-01-01 | End: 2022-01-01 | Stop reason: HOSPADM

## 2022-01-01 RX ORDER — ONDANSETRON 2 MG/ML
4 INJECTION INTRAMUSCULAR; INTRAVENOUS EVERY 6 HOURS PRN
Status: CANCELLED | OUTPATIENT
Start: 2022-01-01

## 2022-01-01 RX ORDER — FENTANYL CITRATE 50 UG/ML
25 INJECTION, SOLUTION INTRAMUSCULAR; INTRAVENOUS
Status: DISCONTINUED | OUTPATIENT
Start: 2022-01-01 | End: 2022-01-01

## 2022-01-01 RX ORDER — MIDAZOLAM HCL IN 0.9 % NACL/PF 1 MG/ML
1-12 PLASTIC BAG, INJECTION (ML) INTRAVENOUS CONTINUOUS
Status: DISCONTINUED | OUTPATIENT
Start: 2022-01-01 | End: 2022-01-01 | Stop reason: HOSPADM

## 2022-01-01 RX ORDER — LEVETIRACETAM 10 MG/ML
1000 INJECTION INTRAVASCULAR EVERY 12 HOURS
Status: DISCONTINUED | OUTPATIENT
Start: 2022-01-01 | End: 2022-01-01

## 2022-01-01 RX ORDER — ASPIRIN 325 MG
TABLET ORAL
Status: DISCONTINUED | OUTPATIENT
Start: 2022-01-01 | End: 2022-01-01 | Stop reason: HOSPADM

## 2022-01-01 RX ORDER — MAGNESIUM SULFATE HEPTAHYDRATE 40 MG/ML
2 INJECTION, SOLUTION INTRAVENOUS ONCE
Status: DISCONTINUED | OUTPATIENT
Start: 2022-01-01 | End: 2022-01-01

## 2022-01-01 RX ORDER — NITROGLYCERIN 0.4 MG/1
0.4 TABLET SUBLINGUAL EVERY 5 MIN PRN
Status: DISCONTINUED | OUTPATIENT
Start: 2022-01-01 | End: 2022-01-01 | Stop reason: HOSPADM

## 2022-01-01 RX ORDER — CEFAZOLIN SODIUM/WATER 2 G/20 ML
SYRINGE (ML) INTRAVENOUS PRN
Status: DISCONTINUED | OUTPATIENT
Start: 2022-01-01 | End: 2022-01-01

## 2022-01-01 RX ORDER — GLYCOPYRROLATE 0.2 MG/ML
0.2 INJECTION, SOLUTION INTRAMUSCULAR; INTRAVENOUS ONCE
Status: COMPLETED | OUTPATIENT
Start: 2022-01-01 | End: 2022-01-01

## 2022-01-01 RX ORDER — OXYMETAZOLINE HYDROCHLORIDE 0.05 G/100ML
2 SPRAY NASAL EVERY 4 HOURS
Status: DISCONTINUED | OUTPATIENT
Start: 2022-01-01 | End: 2022-01-01

## 2022-01-01 RX ORDER — GUAIFENESIN 600 MG/1
15 TABLET, EXTENDED RELEASE ORAL DAILY
Status: DISCONTINUED | OUTPATIENT
Start: 2022-01-01 | End: 2022-01-01

## 2022-01-01 RX ORDER — ATROPINE SULFATE 0.1 MG/ML
0.5 INJECTION INTRAVENOUS
Status: ACTIVE | OUTPATIENT
Start: 2022-01-01 | End: 2022-01-01

## 2022-01-01 RX ORDER — METOPROLOL TARTRATE 1 MG/ML
5 INJECTION, SOLUTION INTRAVENOUS
Status: CANCELLED | OUTPATIENT
Start: 2022-01-01

## 2022-01-01 RX ORDER — NOREPINEPHRINE BITARTRATE 0.06 MG/ML
INJECTION, SOLUTION INTRAVENOUS CONTINUOUS PRN
Status: DISCONTINUED | OUTPATIENT
Start: 2022-01-01 | End: 2022-01-01 | Stop reason: HOSPADM

## 2022-01-01 RX ORDER — SODIUM CHLORIDE 9 MG/ML
INJECTION, SOLUTION INTRAVENOUS CONTINUOUS
Status: ACTIVE | OUTPATIENT
Start: 2022-01-01 | End: 2022-01-01

## 2022-01-01 RX ORDER — METOPROLOL TARTRATE 1 MG/ML
5 INJECTION, SOLUTION INTRAVENOUS
Status: DISCONTINUED | OUTPATIENT
Start: 2022-01-01 | End: 2022-01-01 | Stop reason: HOSPADM

## 2022-01-01 RX ORDER — ACETAZOLAMIDE 500 MG/5ML
500 INJECTION, POWDER, LYOPHILIZED, FOR SOLUTION INTRAVENOUS ONCE
Status: DISCONTINUED | OUTPATIENT
Start: 2022-01-01 | End: 2022-01-01

## 2022-01-01 RX ORDER — SODIUM CHLORIDE, SODIUM GLUCONATE, SODIUM ACETATE, POTASSIUM CHLORIDE AND MAGNESIUM CHLORIDE 526; 502; 368; 37; 30 MG/100ML; MG/100ML; MG/100ML; MG/100ML; MG/100ML
INJECTION, SOLUTION INTRAVENOUS CONTINUOUS PRN
Status: DISCONTINUED | OUTPATIENT
Start: 2022-01-01 | End: 2022-01-01

## 2022-01-01 RX ORDER — HYDROCHLOROTHIAZIDE 25 MG/1
50 TABLET ORAL ONCE
Status: COMPLETED | OUTPATIENT
Start: 2022-01-01 | End: 2022-01-01

## 2022-01-01 RX ORDER — SODIUM CHLORIDE 9 MG/ML
INJECTION, SOLUTION INTRAVENOUS CONTINUOUS
Status: CANCELLED | OUTPATIENT
Start: 2022-01-01 | End: 2022-01-01

## 2022-01-01 RX ORDER — HYDROCHLOROTHIAZIDE 25 MG/1
25 TABLET ORAL ONCE
Status: COMPLETED | OUTPATIENT
Start: 2022-01-01 | End: 2022-01-01

## 2022-01-01 RX ORDER — PROPOFOL 10 MG/ML
5-75 INJECTION, EMULSION INTRAVENOUS CONTINUOUS
Status: DISCONTINUED | OUTPATIENT
Start: 2022-01-01 | End: 2022-01-01

## 2022-01-01 RX ORDER — LEVETIRACETAM 15 MG/ML
1500 INJECTION INTRAVASCULAR EVERY 12 HOURS
Status: DISCONTINUED | OUTPATIENT
Start: 2022-01-01 | End: 2022-01-01

## 2022-01-01 RX ORDER — AMIODARONE HYDROCHLORIDE 200 MG/1
400 TABLET ORAL 2 TIMES DAILY
Status: DISCONTINUED | OUTPATIENT
Start: 2022-01-01 | End: 2022-01-01

## 2022-01-01 RX ORDER — NALOXONE HYDROCHLORIDE 0.4 MG/ML
0.2 INJECTION, SOLUTION INTRAMUSCULAR; INTRAVENOUS; SUBCUTANEOUS
Status: DISCONTINUED | OUTPATIENT
Start: 2022-01-01 | End: 2022-01-01 | Stop reason: HOSPADM

## 2022-01-01 RX ORDER — DEXTROSE MONOHYDRATE 100 MG/ML
INJECTION, SOLUTION INTRAVENOUS CONTINUOUS PRN
Status: DISCONTINUED | OUTPATIENT
Start: 2022-01-01 | End: 2022-01-01 | Stop reason: HOSPADM

## 2022-01-01 RX ORDER — FENTANYL CITRATE 50 UG/ML
INJECTION, SOLUTION INTRAMUSCULAR; INTRAVENOUS
Status: DISCONTINUED | OUTPATIENT
Start: 2022-01-01 | End: 2022-01-01 | Stop reason: HOSPADM

## 2022-01-01 RX ORDER — ONDANSETRON 2 MG/ML
4 INJECTION INTRAMUSCULAR; INTRAVENOUS EVERY 6 HOURS PRN
Status: DISCONTINUED | OUTPATIENT
Start: 2022-01-01 | End: 2022-01-01 | Stop reason: HOSPADM

## 2022-01-01 RX ORDER — ATROPINE SULFATE 10 MG/ML
1 SOLUTION/ DROPS OPHTHALMIC
Status: DISCONTINUED | OUTPATIENT
Start: 2022-01-01 | End: 2022-01-01 | Stop reason: HOSPADM

## 2022-01-01 RX ORDER — MIDAZOLAM HCL IN 0.9 % NACL/PF 1 MG/ML
12 PLASTIC BAG, INJECTION (ML) INTRAVENOUS CONTINUOUS
Status: DISCONTINUED | OUTPATIENT
Start: 2022-01-01 | End: 2022-01-01

## 2022-01-01 RX ORDER — ACETAMINOPHEN 325 MG/1
650 TABLET ORAL EVERY 6 HOURS PRN
Status: DISCONTINUED | OUTPATIENT
Start: 2022-01-01 | End: 2022-01-01 | Stop reason: HOSPADM

## 2022-01-01 RX ORDER — POLYETHYLENE GLYCOL 3350 17 G/17G
17 POWDER, FOR SOLUTION ORAL DAILY
Status: DISCONTINUED | OUTPATIENT
Start: 2022-01-01 | End: 2022-01-01

## 2022-01-01 RX ORDER — NALOXONE HYDROCHLORIDE 0.4 MG/ML
0.4 INJECTION, SOLUTION INTRAMUSCULAR; INTRAVENOUS; SUBCUTANEOUS
Status: ACTIVE | OUTPATIENT
Start: 2022-01-01 | End: 2022-01-01

## 2022-01-01 RX ORDER — FUROSEMIDE 10 MG/ML
60 INJECTION INTRAMUSCULAR; INTRAVENOUS ONCE
Status: DISCONTINUED | OUTPATIENT
Start: 2022-01-01 | End: 2022-01-01

## 2022-01-01 RX ORDER — AMOXICILLIN 250 MG
2 CAPSULE ORAL 2 TIMES DAILY
Status: DISCONTINUED | OUTPATIENT
Start: 2022-01-01 | End: 2022-01-01

## 2022-01-01 RX ORDER — FLUMAZENIL 0.1 MG/ML
0.2 INJECTION, SOLUTION INTRAVENOUS
Status: CANCELLED | OUTPATIENT
Start: 2022-01-01 | End: 2022-01-01

## 2022-01-01 RX ORDER — HEPARIN SODIUM 10000 [USP'U]/100ML
10-50 INJECTION, SOLUTION INTRAVENOUS CONTINUOUS
Status: DISCONTINUED | OUTPATIENT
Start: 2022-01-01 | End: 2022-01-01

## 2022-01-01 RX ORDER — MAGNESIUM SULFATE HEPTAHYDRATE 40 MG/ML
4 INJECTION, SOLUTION INTRAVENOUS EVERY 4 HOURS PRN
Status: DISCONTINUED | OUTPATIENT
Start: 2022-01-01 | End: 2022-01-01

## 2022-01-01 RX ORDER — FENTANYL CITRATE 50 UG/ML
25 INJECTION, SOLUTION INTRAMUSCULAR; INTRAVENOUS ONCE
Status: DISCONTINUED | OUTPATIENT
Start: 2022-01-01 | End: 2022-01-01 | Stop reason: HOSPADM

## 2022-01-01 RX ORDER — OXYCODONE HYDROCHLORIDE 10 MG/1
10 TABLET ORAL EVERY 4 HOURS PRN
Status: CANCELLED | OUTPATIENT
Start: 2022-01-01

## 2022-01-01 RX ORDER — HEPARIN SODIUM 200 [USP'U]/100ML
3 INJECTION, SOLUTION INTRAVENOUS CONTINUOUS
Status: DISCONTINUED | OUTPATIENT
Start: 2022-01-01 | End: 2022-01-01

## 2022-01-01 RX ORDER — POTASSIUM CHLORIDE 1.5 G/1.58G
20 POWDER, FOR SOLUTION ORAL ONCE
Status: COMPLETED | OUTPATIENT
Start: 2022-01-01 | End: 2022-01-01

## 2022-01-01 RX ORDER — VANCOMYCIN HYDROCHLORIDE 1 G/20ML
2000 INJECTION, POWDER, LYOPHILIZED, FOR SOLUTION INTRAVENOUS ONCE
Status: DISCONTINUED | OUTPATIENT
Start: 2022-01-01 | End: 2022-01-01

## 2022-01-01 RX ORDER — NALOXONE HYDROCHLORIDE 0.4 MG/ML
0.4 INJECTION, SOLUTION INTRAMUSCULAR; INTRAVENOUS; SUBCUTANEOUS
Status: DISCONTINUED | OUTPATIENT
Start: 2022-01-01 | End: 2022-01-01 | Stop reason: HOSPADM

## 2022-01-01 RX ORDER — ONDANSETRON 4 MG/1
4 TABLET, ORALLY DISINTEGRATING ORAL EVERY 6 HOURS PRN
Status: CANCELLED | OUTPATIENT
Start: 2022-01-01

## 2022-01-01 RX ORDER — OXYMETAZOLINE HYDROCHLORIDE 0.05 G/100ML
3 SPRAY NASAL ONCE
Status: COMPLETED | OUTPATIENT
Start: 2022-01-01 | End: 2022-01-01

## 2022-01-01 RX ORDER — CLOBAZAM 10 MG/1
10 TABLET ORAL 2 TIMES DAILY
Status: DISCONTINUED | OUTPATIENT
Start: 2022-01-01 | End: 2022-01-01 | Stop reason: HOSPADM

## 2022-01-01 RX ORDER — CALCIUM CHLORIDE 100 MG/ML
INJECTION INTRAVENOUS; INTRAVENTRICULAR PRN
Status: DISCONTINUED | OUTPATIENT
Start: 2022-01-01 | End: 2022-01-01

## 2022-01-01 RX ORDER — IBUPROFEN 200 MG
200 TABLET ORAL EVERY 6 HOURS PRN
Status: DISCONTINUED | OUTPATIENT
Start: 2022-01-01 | End: 2022-01-01 | Stop reason: HOSPADM

## 2022-01-01 RX ORDER — PROTAMINE SULFATE 10 MG/ML
INJECTION, SOLUTION INTRAVENOUS PRN
Status: DISCONTINUED | OUTPATIENT
Start: 2022-01-01 | End: 2022-01-01

## 2022-01-01 RX ORDER — NALOXONE HYDROCHLORIDE 0.4 MG/ML
0.2 INJECTION, SOLUTION INTRAMUSCULAR; INTRAVENOUS; SUBCUTANEOUS
Status: CANCELLED | OUTPATIENT
Start: 2022-01-01 | End: 2022-01-01

## 2022-01-01 RX ORDER — POLYETHYLENE GLYCOL 3350 17 G/17G
17 POWDER, FOR SOLUTION ORAL 2 TIMES DAILY
Status: DISCONTINUED | OUTPATIENT
Start: 2022-01-01 | End: 2022-01-01

## 2022-01-01 RX ORDER — FUROSEMIDE 10 MG/ML
INJECTION INTRAMUSCULAR; INTRAVENOUS
Status: DISCONTINUED
Start: 2022-01-01 | End: 2022-01-01 | Stop reason: HOSPADM

## 2022-01-01 RX ORDER — GLYCOPYRROLATE 0.2 MG/ML
0.2 INJECTION, SOLUTION INTRAMUSCULAR; INTRAVENOUS EVERY 30 MIN PRN
Status: DISCONTINUED | OUTPATIENT
Start: 2022-01-01 | End: 2022-01-01 | Stop reason: HOSPADM

## 2022-01-01 RX ORDER — HYDRALAZINE HYDROCHLORIDE 20 MG/ML
10 INJECTION INTRAMUSCULAR; INTRAVENOUS EVERY 4 HOURS PRN
Status: DISCONTINUED | OUTPATIENT
Start: 2022-01-01 | End: 2022-01-01 | Stop reason: HOSPADM

## 2022-01-01 RX ORDER — PIPERACILLIN SODIUM, TAZOBACTAM SODIUM 3; .375 G/15ML; G/15ML
3.38 INJECTION, POWDER, LYOPHILIZED, FOR SOLUTION INTRAVENOUS EVERY 6 HOURS
Status: DISPENSED | OUTPATIENT
Start: 2022-01-01 | End: 2022-01-01

## 2022-01-01 RX ORDER — SODIUM CHLORIDE 9 MG/ML
INJECTION, SOLUTION INTRAVENOUS CONTINUOUS
Status: DISCONTINUED | OUTPATIENT
Start: 2022-01-01 | End: 2022-01-01

## 2022-01-01 RX ORDER — FENTANYL CITRATE 50 UG/ML
50 INJECTION, SOLUTION INTRAMUSCULAR; INTRAVENOUS EVERY 30 MIN PRN
Status: DISCONTINUED | OUTPATIENT
Start: 2022-01-01 | End: 2022-01-01 | Stop reason: HOSPADM

## 2022-01-01 RX ORDER — NITROGLYCERIN 0.4 MG/1
0.4 TABLET SUBLINGUAL EVERY 5 MIN PRN
Status: CANCELLED | OUTPATIENT
Start: 2022-01-01

## 2022-01-01 RX ORDER — FENTANYL CITRATE 50 UG/ML
50 INJECTION, SOLUTION INTRAMUSCULAR; INTRAVENOUS EVERY 10 MIN PRN
Status: DISCONTINUED | OUTPATIENT
Start: 2022-01-01 | End: 2022-01-01 | Stop reason: HOSPADM

## 2022-01-01 RX ORDER — HYDROCHLOROTHIAZIDE 25 MG/1
25 TABLET ORAL DAILY
Status: DISCONTINUED | OUTPATIENT
Start: 2022-01-01 | End: 2022-01-01

## 2022-01-01 RX ORDER — FLUMAZENIL 0.1 MG/ML
0.2 INJECTION, SOLUTION INTRAVENOUS
Status: ACTIVE | OUTPATIENT
Start: 2022-01-01 | End: 2022-01-01

## 2022-01-01 RX ORDER — AMINO AC/PROTEIN HYDR/WHEY PRO 10G-100/30
2 LIQUID (ML) ORAL 4 TIMES DAILY
Status: DISCONTINUED | OUTPATIENT
Start: 2022-01-01 | End: 2022-01-01

## 2022-01-01 RX ORDER — BISACODYL 10 MG
10 SUPPOSITORY, RECTAL RECTAL ONCE
Status: COMPLETED | OUTPATIENT
Start: 2022-01-01 | End: 2022-01-01

## 2022-01-01 RX ORDER — ATROPINE SULFATE 0.1 MG/ML
0.5 INJECTION INTRAVENOUS
Status: CANCELLED | OUTPATIENT
Start: 2022-01-01 | End: 2022-01-01

## 2022-01-01 RX ORDER — GLYCOPYRROLATE 0.2 MG/ML
0.1 INJECTION, SOLUTION INTRAMUSCULAR; INTRAVENOUS EVERY 4 HOURS PRN
Status: DISCONTINUED | OUTPATIENT
Start: 2022-01-01 | End: 2022-01-01 | Stop reason: HOSPADM

## 2022-01-01 RX ORDER — MIDAZOLAM HCL IN 0.9 % NACL/PF 1 MG/ML
PLASTIC BAG, INJECTION (ML) INTRAVENOUS CONTINUOUS PRN
Status: COMPLETED | OUTPATIENT
Start: 2022-01-01 | End: 2022-01-01

## 2022-01-01 RX ORDER — OXYCODONE HYDROCHLORIDE 10 MG/1
10 TABLET ORAL EVERY 4 HOURS PRN
Status: DISCONTINUED | OUTPATIENT
Start: 2022-01-01 | End: 2022-01-01

## 2022-01-01 RX ORDER — MAGNESIUM SULFATE HEPTAHYDRATE 40 MG/ML
2 INJECTION, SOLUTION INTRAVENOUS DAILY PRN
Status: DISCONTINUED | OUTPATIENT
Start: 2022-01-01 | End: 2022-01-01

## 2022-01-01 RX ORDER — HEPARIN SODIUM 1000 [USP'U]/ML
INJECTION, SOLUTION INTRAVENOUS; SUBCUTANEOUS PRN
Status: DISCONTINUED | OUTPATIENT
Start: 2022-01-01 | End: 2022-01-01

## 2022-01-01 RX ORDER — ONDANSETRON 4 MG/1
4 TABLET, ORALLY DISINTEGRATING ORAL EVERY 6 HOURS PRN
Status: DISCONTINUED | OUTPATIENT
Start: 2022-01-01 | End: 2022-01-01 | Stop reason: HOSPADM

## 2022-01-01 RX ORDER — FENTANYL CITRATE 50 UG/ML
INJECTION, SOLUTION INTRAMUSCULAR; INTRAVENOUS PRN
Status: DISCONTINUED | OUTPATIENT
Start: 2022-01-01 | End: 2022-01-01

## 2022-01-01 RX ORDER — AMOXICILLIN 250 MG
2 CAPSULE ORAL AT BEDTIME
Status: DISCONTINUED | OUTPATIENT
Start: 2022-01-01 | End: 2022-01-01

## 2022-01-01 RX ORDER — OXYCODONE HYDROCHLORIDE 5 MG/1
5 TABLET ORAL EVERY 4 HOURS PRN
Status: CANCELLED | OUTPATIENT
Start: 2022-01-01

## 2022-01-01 RX ORDER — FUROSEMIDE 10 MG/ML
60 INJECTION INTRAMUSCULAR; INTRAVENOUS EVERY 6 HOURS
Status: DISPENSED | OUTPATIENT
Start: 2022-01-01 | End: 2022-01-01

## 2022-01-01 RX ORDER — HYDRALAZINE HYDROCHLORIDE 20 MG/ML
10 INJECTION INTRAMUSCULAR; INTRAVENOUS EVERY 4 HOURS PRN
Status: CANCELLED | OUTPATIENT
Start: 2022-01-01

## 2022-01-01 RX ORDER — LIDOCAINE 40 MG/G
CREAM TOPICAL
Status: DISCONTINUED | OUTPATIENT
Start: 2022-01-01 | End: 2022-01-01 | Stop reason: HOSPADM

## 2022-01-01 RX ORDER — AMINO AC/PROTEIN HYDR/WHEY PRO 10G-100/30
2 LIQUID (ML) ORAL 3 TIMES DAILY
Status: DISCONTINUED | OUTPATIENT
Start: 2022-01-01 | End: 2022-01-01

## 2022-01-01 RX ORDER — POTASSIUM CHLORIDE 29.8 MG/ML
INJECTION INTRAVENOUS CONTINUOUS PRN
Status: COMPLETED | OUTPATIENT
Start: 2022-01-01 | End: 2022-01-01

## 2022-01-01 RX ORDER — GADOBUTROL 604.72 MG/ML
10 INJECTION INTRAVENOUS ONCE
Status: COMPLETED | OUTPATIENT
Start: 2022-01-01 | End: 2022-01-01

## 2022-01-01 RX ORDER — FUROSEMIDE 10 MG/ML
40 INJECTION INTRAMUSCULAR; INTRAVENOUS ONCE
Status: COMPLETED | OUTPATIENT
Start: 2022-01-01 | End: 2022-01-01

## 2022-01-01 RX ORDER — OXYCODONE HYDROCHLORIDE 5 MG/1
5 TABLET ORAL EVERY 4 HOURS PRN
Status: DISCONTINUED | OUTPATIENT
Start: 2022-01-01 | End: 2022-01-01

## 2022-01-01 RX ORDER — ADENOSINE 3 MG/ML
INJECTION, SOLUTION INTRAVENOUS
Status: DISCONTINUED | OUTPATIENT
Start: 2022-01-01 | End: 2022-01-01 | Stop reason: HOSPADM

## 2022-01-01 RX ORDER — NICOTINE POLACRILEX 4 MG
15-30 LOZENGE BUCCAL
Status: DISCONTINUED | OUTPATIENT
Start: 2022-01-01 | End: 2022-01-01 | Stop reason: HOSPADM

## 2022-01-01 RX ADMIN — TICAGRELOR 90 MG: 90 TABLET ORAL at 08:06

## 2022-01-01 RX ADMIN — PIPERACILLIN AND TAZOBACTAM 3.38 G: 3; .375 INJECTION, POWDER, LYOPHILIZED, FOR SOLUTION INTRAVENOUS at 04:04

## 2022-01-01 RX ADMIN — CLOBAZAM 5 MG: 10 TABLET ORAL at 07:49

## 2022-01-01 RX ADMIN — MIDAZOLAM HYDROCHLORIDE 12 MG/HR: 1 INJECTION, SOLUTION INTRAVENOUS at 15:02

## 2022-01-01 RX ADMIN — ACETAMINOPHEN 650 MG: 325 TABLET, FILM COATED ORAL at 12:17

## 2022-01-01 RX ADMIN — FUROSEMIDE 40 MG: 10 INJECTION, SOLUTION INTRAVENOUS at 09:09

## 2022-01-01 RX ADMIN — Medication 2 PACKET: at 07:49

## 2022-01-01 RX ADMIN — OXYMETAZOLINE HYDROCHLORIDE 2 SPRAY: 0.05 SPRAY NASAL at 20:06

## 2022-01-01 RX ADMIN — OXYMETAZOLINE HYDROCHLORIDE 2 SPRAY: 0.05 SPRAY NASAL at 03:56

## 2022-01-01 RX ADMIN — TICAGRELOR 90 MG: 90 TABLET ORAL at 07:42

## 2022-01-01 RX ADMIN — THIAMINE HCL TAB 100 MG 100 MG: 100 TAB at 08:17

## 2022-01-01 RX ADMIN — TICAGRELOR 90 MG: 90 TABLET ORAL at 19:58

## 2022-01-01 RX ADMIN — CLOBAZAM 10 MG: 10 TABLET ORAL at 20:44

## 2022-01-01 RX ADMIN — PIPERACILLIN AND TAZOBACTAM 3.38 G: 3; .375 INJECTION, POWDER, LYOPHILIZED, FOR SOLUTION INTRAVENOUS at 10:54

## 2022-01-01 RX ADMIN — VALPROATE SODIUM 1000 MG: 100 INJECTION, SOLUTION INTRAVENOUS at 03:55

## 2022-01-01 RX ADMIN — PROPOFOL 30 MCG/KG/MIN: 10 INJECTION, EMULSION INTRAVENOUS at 06:05

## 2022-01-01 RX ADMIN — MULTIVITAMIN 15 ML: LIQUID ORAL at 07:47

## 2022-01-01 RX ADMIN — THIAMINE HCL TAB 100 MG 100 MG: 100 TAB at 08:36

## 2022-01-01 RX ADMIN — VALPROATE SODIUM 1000 MG: 100 INJECTION, SOLUTION INTRAVENOUS at 11:59

## 2022-01-01 RX ADMIN — HUMAN INSULIN 7 UNITS/HR: 100 INJECTION, SOLUTION SUBCUTANEOUS at 05:18

## 2022-01-01 RX ADMIN — OXYMETAZOLINE HYDROCHLORIDE 2 SPRAY: 0.05 SPRAY NASAL at 08:05

## 2022-01-01 RX ADMIN — ACETAZOLAMIDE 500 MG: 500 INJECTION, POWDER, LYOPHILIZED, FOR SOLUTION INTRAVENOUS at 15:12

## 2022-01-01 RX ADMIN — Medication 200 MCG/HR: at 20:01

## 2022-01-01 RX ADMIN — Medication 150 MCG/HR: at 01:04

## 2022-01-01 RX ADMIN — FUROSEMIDE 60 MG: 10 INJECTION, SOLUTION INTRAMUSCULAR; INTRAVENOUS at 08:04

## 2022-01-01 RX ADMIN — HEPARIN SODIUM AND DEXTROSE 1800 UNITS/HR: 10000; 5 INJECTION INTRAVENOUS at 18:46

## 2022-01-01 RX ADMIN — ASPIRIN 81 MG CHEWABLE TABLET 81 MG: 81 TABLET CHEWABLE at 08:36

## 2022-01-01 RX ADMIN — ASPIRIN 81 MG CHEWABLE TABLET 81 MG: 81 TABLET CHEWABLE at 07:58

## 2022-01-01 RX ADMIN — ACETAMINOPHEN 650 MG: 325 TABLET, FILM COATED ORAL at 03:49

## 2022-01-01 RX ADMIN — ASPIRIN 81 MG CHEWABLE TABLET 81 MG: 81 TABLET CHEWABLE at 07:42

## 2022-01-01 RX ADMIN — HEPARIN SODIUM 500 UNITS/HR: 10000 INJECTION, SOLUTION INTRAVENOUS at 09:29

## 2022-01-01 RX ADMIN — PROPOFOL 25 MCG/KG/MIN: 10 INJECTION, EMULSION INTRAVENOUS at 19:05

## 2022-01-01 RX ADMIN — PROPOFOL 40 MCG/KG/MIN: 10 INJECTION, EMULSION INTRAVENOUS at 00:33

## 2022-01-01 RX ADMIN — Medication 200 MCG/HR: at 08:17

## 2022-01-01 RX ADMIN — HEPARIN SODIUM 1800 UNITS/HR: 10000 INJECTION, SOLUTION INTRAVENOUS at 06:59

## 2022-01-01 RX ADMIN — PROPOFOL 25 MCG/KG/MIN: 10 INJECTION, EMULSION INTRAVENOUS at 03:29

## 2022-01-01 RX ADMIN — ASPIRIN 81 MG CHEWABLE TABLET 81 MG: 81 TABLET CHEWABLE at 07:40

## 2022-01-01 RX ADMIN — PROPOFOL 25 MCG/KG/MIN: 10 INJECTION, EMULSION INTRAVENOUS at 21:14

## 2022-01-01 RX ADMIN — PROPOFOL 30 MCG/KG/MIN: 10 INJECTION, EMULSION INTRAVENOUS at 13:59

## 2022-01-01 RX ADMIN — OXYMETAZOLINE HYDROCHLORIDE 2 SPRAY: 0.05 SPRAY NASAL at 20:13

## 2022-01-01 RX ADMIN — TICAGRELOR 90 MG: 90 TABLET ORAL at 08:05

## 2022-01-01 RX ADMIN — Medication 2 PACKET: at 19:36

## 2022-01-01 RX ADMIN — PROPOFOL 25 MCG/KG/MIN: 10 INJECTION, EMULSION INTRAVENOUS at 01:28

## 2022-01-01 RX ADMIN — POLYETHYLENE GLYCOL 3350 17 G: 17 POWDER, FOR SOLUTION ORAL at 08:06

## 2022-01-01 RX ADMIN — ACETAMINOPHEN 650 MG: 325 TABLET, FILM COATED ORAL at 19:32

## 2022-01-01 RX ADMIN — Medication 2 PACKET: at 08:38

## 2022-01-01 RX ADMIN — SODIUM CHLORIDE, SODIUM GLUCONATE, SODIUM ACETATE, POTASSIUM CHLORIDE AND MAGNESIUM CHLORIDE: 526; 502; 368; 37; 30 INJECTION, SOLUTION INTRAVENOUS at 16:57

## 2022-01-01 RX ADMIN — MAGNESIUM SULFATE HEPTAHYDRATE 2 G: 40 INJECTION, SOLUTION INTRAVENOUS at 18:19

## 2022-01-01 RX ADMIN — Medication 1 UNITS/HR: at 23:39

## 2022-01-01 RX ADMIN — GLYCOPYRROLATE 0.2 MG: 0.2 INJECTION INTRAMUSCULAR; INTRAVENOUS at 10:46

## 2022-01-01 RX ADMIN — THIAMINE HCL TAB 100 MG 100 MG: 100 TAB at 07:40

## 2022-01-01 RX ADMIN — POTASSIUM CHLORIDE 20 MEQ: 29.8 INJECTION, SOLUTION INTRAVENOUS at 11:53

## 2022-01-01 RX ADMIN — THIAMINE HCL TAB 100 MG 100 MG: 100 TAB at 07:49

## 2022-01-01 RX ADMIN — PROPOFOL 25 MCG/KG/MIN: 10 INJECTION, EMULSION INTRAVENOUS at 14:43

## 2022-01-01 RX ADMIN — HEPARIN SODIUM 1900 UNITS/HR: 10000 INJECTION, SOLUTION INTRAVENOUS at 14:16

## 2022-01-01 RX ADMIN — AMIODARONE HYDROCHLORIDE 0.5 MG/MIN: 50 INJECTION, SOLUTION INTRAVENOUS at 20:16

## 2022-01-01 RX ADMIN — AMIODARONE HYDROCHLORIDE 1 MG/MIN: 50 INJECTION, SOLUTION INTRAVENOUS at 16:27

## 2022-01-01 RX ADMIN — AMIODARONE HYDROCHLORIDE 400 MG: 200 TABLET ORAL at 09:09

## 2022-01-01 RX ADMIN — TICAGRELOR 90 MG: 90 TABLET ORAL at 19:55

## 2022-01-01 RX ADMIN — MIDAZOLAM HYDROCHLORIDE 2 MG/HR: 1 INJECTION, SOLUTION INTRAVENOUS at 18:34

## 2022-01-01 RX ADMIN — AMIODARONE HYDROCHLORIDE 400 MG: 200 TABLET ORAL at 19:34

## 2022-01-01 RX ADMIN — CALCIUM CHLORIDE 1 G: 100 INJECTION, SOLUTION INTRAVENOUS at 17:33

## 2022-01-01 RX ADMIN — HEPARIN SODIUM 1800 UNITS/HR: 10000 INJECTION, SOLUTION INTRAVENOUS at 20:13

## 2022-01-01 RX ADMIN — FENTANYL CITRATE 100 MCG: 50 INJECTION, SOLUTION INTRAMUSCULAR; INTRAVENOUS at 14:52

## 2022-01-01 RX ADMIN — CLOBAZAM 10 MG: 10 TABLET ORAL at 20:28

## 2022-01-01 RX ADMIN — POTASSIUM CHLORIDE 20 MEQ: 29.8 INJECTION, SOLUTION INTRAVENOUS at 05:27

## 2022-01-01 RX ADMIN — PIPERACILLIN AND TAZOBACTAM 3.38 G: 3; .375 INJECTION, POWDER, LYOPHILIZED, FOR SOLUTION INTRAVENOUS at 15:14

## 2022-01-01 RX ADMIN — PROPOFOL 25 MCG/KG/MIN: 10 INJECTION, EMULSION INTRAVENOUS at 02:53

## 2022-01-01 RX ADMIN — HUMAN INSULIN 10 UNITS/HR: 100 INJECTION, SOLUTION SUBCUTANEOUS at 18:50

## 2022-01-01 RX ADMIN — LEVETIRACETAM 2000 MG: 100 INJECTION, SOLUTION INTRAVENOUS at 19:56

## 2022-01-01 RX ADMIN — PROPOFOL 25 MCG/KG/MIN: 10 INJECTION, EMULSION INTRAVENOUS at 13:37

## 2022-01-01 RX ADMIN — PROPOFOL 30 MCG/KG/MIN: 10 INJECTION, EMULSION INTRAVENOUS at 14:41

## 2022-01-01 RX ADMIN — MIDAZOLAM HYDROCHLORIDE 8 MG/HR: 1 INJECTION, SOLUTION INTRAVENOUS at 07:36

## 2022-01-01 RX ADMIN — NOREPINEPHRINE BITARTRATE 6.4 MCG: 1 INJECTION, SOLUTION, CONCENTRATE INTRAVENOUS at 17:39

## 2022-01-01 RX ADMIN — MIDAZOLAM HYDROCHLORIDE 8 MG/HR: 1 INJECTION, SOLUTION INTRAVENOUS at 20:02

## 2022-01-01 RX ADMIN — Medication 1000 UNITS: at 06:01

## 2022-01-01 RX ADMIN — PROPOFOL 50 MCG/KG/MIN: 10 INJECTION, EMULSION INTRAVENOUS at 09:49

## 2022-01-01 RX ADMIN — LEVETIRACETAM 500 MG: 5 INJECTION INTRAVENOUS at 11:38

## 2022-01-01 RX ADMIN — HEPARIN SODIUM 5000 UNITS: 1000 INJECTION INTRAVENOUS; SUBCUTANEOUS at 15:10

## 2022-01-01 RX ADMIN — LEVETIRACETAM 1500 MG: 15 INJECTION INTRAVENOUS at 19:34

## 2022-01-01 RX ADMIN — CALCIUM CHLORIDE 500 MG: 100 INJECTION INTRAVENOUS; INTRAVENTRICULAR at 17:32

## 2022-01-01 RX ADMIN — PROPOFOL 50 MCG/KG/MIN: 10 INJECTION, EMULSION INTRAVENOUS at 10:58

## 2022-01-01 RX ADMIN — OXYMETAZOLINE HYDROCHLORIDE 2 SPRAY: 0.05 SPRAY NASAL at 11:43

## 2022-01-01 RX ADMIN — CLOBAZAM 10 MG: 10 TABLET ORAL at 07:42

## 2022-01-01 RX ADMIN — HUMAN INSULIN 5 UNITS/HR: 100 INJECTION, SOLUTION SUBCUTANEOUS at 18:18

## 2022-01-01 RX ADMIN — Medication 40 MG: at 07:40

## 2022-01-01 RX ADMIN — OXYMETAZOLINE HYDROCHLORIDE 2 SPRAY: 0.05 SPRAY NASAL at 16:06

## 2022-01-01 RX ADMIN — POTASSIUM CHLORIDE 20 MEQ: 29.8 INJECTION, SOLUTION INTRAVENOUS at 19:00

## 2022-01-01 RX ADMIN — AMIODARONE HYDROCHLORIDE 400 MG: 200 TABLET ORAL at 19:23

## 2022-01-01 RX ADMIN — CALCIUM CHLORIDE 1 G: 100 INJECTION, SOLUTION INTRAVENOUS at 17:16

## 2022-01-01 RX ADMIN — AMIODARONE HYDROCHLORIDE 1 MG/MIN: 50 INJECTION, SOLUTION INTRAVENOUS at 14:35

## 2022-01-01 RX ADMIN — VALPROATE SODIUM 1000 MG: 100 INJECTION, SOLUTION INTRAVENOUS at 12:57

## 2022-01-01 RX ADMIN — LEVETIRACETAM 4000 MG: 100 INJECTION, SOLUTION INTRAVENOUS at 11:23

## 2022-01-01 RX ADMIN — CLOBAZAM 10 MG: 10 TABLET ORAL at 08:34

## 2022-01-01 RX ADMIN — Medication 50 MCG: at 22:33

## 2022-01-01 RX ADMIN — SENNOSIDES AND DOCUSATE SODIUM 2 TABLET: 8.6; 5 TABLET ORAL at 22:16

## 2022-01-01 RX ADMIN — FUROSEMIDE 60 MG: 10 INJECTION, SOLUTION INTRAVENOUS at 18:22

## 2022-01-01 RX ADMIN — MIDAZOLAM HYDROCHLORIDE 12 MG/HR: 1 INJECTION, SOLUTION INTRAVENOUS at 15:11

## 2022-01-01 RX ADMIN — Medication 2 PACKET: at 14:43

## 2022-01-01 RX ADMIN — Medication 50 MCG: at 01:00

## 2022-01-01 RX ADMIN — Medication 50 MCG: at 02:45

## 2022-01-01 RX ADMIN — INSULIN GLARGINE 10 UNITS: 100 INJECTION, SOLUTION SUBCUTANEOUS at 23:01

## 2022-01-01 RX ADMIN — MIDAZOLAM 2 MG: 1 INJECTION INTRAMUSCULAR; INTRAVENOUS at 01:00

## 2022-01-01 RX ADMIN — TICAGRELOR 90 MG: 90 TABLET ORAL at 08:36

## 2022-01-01 RX ADMIN — NICARDIPINE HYDROCHLORIDE 2.5 MG/HR: 0.2 INJECTION, SOLUTION INTRAVENOUS at 20:53

## 2022-01-01 RX ADMIN — FUROSEMIDE 60 MG: 10 INJECTION, SOLUTION INTRAVENOUS at 12:59

## 2022-01-01 RX ADMIN — HEPARIN SODIUM AND DEXTROSE 1000 UNITS/HR: 10000; 5 INJECTION INTRAVENOUS at 12:01

## 2022-01-01 RX ADMIN — POTASSIUM CHLORIDE 20 MEQ: 29.8 INJECTION, SOLUTION INTRAVENOUS at 00:32

## 2022-01-01 RX ADMIN — POTASSIUM CHLORIDE 20 MEQ: 29.8 INJECTION, SOLUTION INTRAVENOUS at 14:53

## 2022-01-01 RX ADMIN — SENNOSIDES AND DOCUSATE SODIUM 2 TABLET: 8.6; 5 TABLET ORAL at 21:01

## 2022-01-01 RX ADMIN — ASPIRIN 81 MG CHEWABLE TABLET 81 MG: 81 TABLET CHEWABLE at 07:49

## 2022-01-01 RX ADMIN — CLOBAZAM 5 MG: 10 TABLET ORAL at 07:42

## 2022-01-01 RX ADMIN — PROPOFOL 30 MCG/KG/MIN: 10 INJECTION, EMULSION INTRAVENOUS at 16:37

## 2022-01-01 RX ADMIN — HEPARIN SODIUM AND DEXTROSE 1800 UNITS/HR: 10000; 5 INJECTION INTRAVENOUS at 06:41

## 2022-01-01 RX ADMIN — Medication 40 MG: at 08:35

## 2022-01-01 RX ADMIN — Medication 2 PACKET: at 20:28

## 2022-01-01 RX ADMIN — VALPROATE SODIUM 1000 MG: 100 INJECTION, SOLUTION INTRAVENOUS at 20:53

## 2022-01-01 RX ADMIN — HUMAN INSULIN 8 UNITS/HR: 100 INJECTION, SOLUTION SUBCUTANEOUS at 01:05

## 2022-01-01 RX ADMIN — PANTOPRAZOLE SODIUM 40 MG: 40 INJECTION, POWDER, FOR SOLUTION INTRAVENOUS at 07:40

## 2022-01-01 RX ADMIN — MULTIVITAMIN 15 ML: LIQUID ORAL at 08:02

## 2022-01-01 RX ADMIN — HUMAN INSULIN 10 UNITS/HR: 100 INJECTION, SOLUTION SUBCUTANEOUS at 18:13

## 2022-01-01 RX ADMIN — POTASSIUM CHLORIDE 20 MEQ: 29.8 INJECTION, SOLUTION INTRAVENOUS at 18:19

## 2022-01-01 RX ADMIN — Medication 2 PACKET: at 15:21

## 2022-01-01 RX ADMIN — POLYETHYLENE GLYCOL 3350 17 G: 17 POWDER, FOR SOLUTION ORAL at 08:18

## 2022-01-01 RX ADMIN — PIPERACILLIN AND TAZOBACTAM 3.38 G: 3; .375 INJECTION, POWDER, LYOPHILIZED, FOR SOLUTION INTRAVENOUS at 09:55

## 2022-01-01 RX ADMIN — HUMAN INSULIN 2 UNITS/HR: 100 INJECTION, SOLUTION SUBCUTANEOUS at 13:15

## 2022-01-01 RX ADMIN — PIPERACILLIN AND TAZOBACTAM 3.38 G: 3; .375 INJECTION, POWDER, LYOPHILIZED, FOR SOLUTION INTRAVENOUS at 09:07

## 2022-01-01 RX ADMIN — LEVETIRACETAM 2000 MG: 100 INJECTION, SOLUTION INTRAVENOUS at 20:13

## 2022-01-01 RX ADMIN — FUROSEMIDE 60 MG: 10 INJECTION, SOLUTION INTRAMUSCULAR; INTRAVENOUS at 10:39

## 2022-01-01 RX ADMIN — PROPOFOL 30 MCG/KG/MIN: 10 INJECTION, EMULSION INTRAVENOUS at 20:02

## 2022-01-01 RX ADMIN — HEPARIN SODIUM AND DEXTROSE 1800 UNITS/HR: 10000; 5 INJECTION INTRAVENOUS at 20:52

## 2022-01-01 RX ADMIN — PROPOFOL 40 MCG/KG/MIN: 10 INJECTION, EMULSION INTRAVENOUS at 02:52

## 2022-01-01 RX ADMIN — Medication 0.03 MCG/KG/MIN: at 00:49

## 2022-01-01 RX ADMIN — AMIODARONE HYDROCHLORIDE 400 MG: 200 TABLET ORAL at 19:54

## 2022-01-01 RX ADMIN — HEPARIN SODIUM 3 ML/HR: 200 INJECTION, SOLUTION INTRAVENOUS at 09:55

## 2022-01-01 RX ADMIN — ASPIRIN 81 MG CHEWABLE TABLET 81 MG: 81 TABLET CHEWABLE at 07:41

## 2022-01-01 RX ADMIN — OXYMETAZOLINE HYDROCHLORIDE 2 SPRAY: 0.05 SPRAY NASAL at 00:41

## 2022-01-01 RX ADMIN — MIDAZOLAM HYDROCHLORIDE 12 MG/HR: 1 INJECTION, SOLUTION INTRAVENOUS at 06:38

## 2022-01-01 RX ADMIN — PROPOFOL 50 MCG/KG/MIN: 10 INJECTION, EMULSION INTRAVENOUS at 04:36

## 2022-01-01 RX ADMIN — Medication 2 PACKET: at 11:32

## 2022-01-01 RX ADMIN — PROPOFOL 50 MCG/KG/MIN: 10 INJECTION, EMULSION INTRAVENOUS at 08:28

## 2022-01-01 RX ADMIN — HEPARIN SODIUM 1800 UNITS/HR: 10000 INJECTION, SOLUTION INTRAVENOUS at 02:12

## 2022-01-01 RX ADMIN — Medication 75 MCG/HR: at 18:16

## 2022-01-01 RX ADMIN — Medication 50 MG: at 14:50

## 2022-01-01 RX ADMIN — PROPOFOL 50 MCG/KG/MIN: 10 INJECTION, EMULSION INTRAVENOUS at 20:36

## 2022-01-01 RX ADMIN — VALPROATE SODIUM 1000 MG: 100 INJECTION, SOLUTION INTRAVENOUS at 19:32

## 2022-01-01 RX ADMIN — TICAGRELOR 90 MG: 90 TABLET ORAL at 08:17

## 2022-01-01 RX ADMIN — CALCIUM CHLORIDE 1 G: 100 INJECTION, SOLUTION INTRAVENOUS at 05:51

## 2022-01-01 RX ADMIN — LEVETIRACETAM 2000 MG: 100 INJECTION, SOLUTION INTRAVENOUS at 08:35

## 2022-01-01 RX ADMIN — THIAMINE HCL TAB 100 MG 100 MG: 100 TAB at 07:37

## 2022-01-01 RX ADMIN — MIDAZOLAM HYDROCHLORIDE 12 MG/HR: 1 INJECTION, SOLUTION INTRAVENOUS at 07:40

## 2022-01-01 RX ADMIN — PIPERACILLIN AND TAZOBACTAM 3.38 G: 3; .375 INJECTION, POWDER, LYOPHILIZED, FOR SOLUTION INTRAVENOUS at 23:25

## 2022-01-01 RX ADMIN — LEVETIRACETAM 2000 MG: 100 INJECTION, SOLUTION INTRAVENOUS at 08:00

## 2022-01-01 RX ADMIN — HEPARIN SODIUM AND DEXTROSE 1000 UNITS/HR: 10000; 5 INJECTION INTRAVENOUS at 13:21

## 2022-01-01 RX ADMIN — Medication 2 PACKET: at 08:46

## 2022-01-01 RX ADMIN — AMIODARONE HYDROCHLORIDE 400 MG: 200 TABLET ORAL at 08:06

## 2022-01-01 RX ADMIN — Medication 40 MG: at 07:42

## 2022-01-01 RX ADMIN — POLYETHYLENE GLYCOL 3350 17 G: 17 POWDER, FOR SOLUTION ORAL at 20:28

## 2022-01-01 RX ADMIN — PROPOFOL 25 MCG/KG/MIN: 10 INJECTION, EMULSION INTRAVENOUS at 08:34

## 2022-01-01 RX ADMIN — TICAGRELOR 90 MG: 90 TABLET ORAL at 20:28

## 2022-01-01 RX ADMIN — POTASSIUM CHLORIDE 20 MEQ: 29.8 INJECTION, SOLUTION INTRAVENOUS at 00:03

## 2022-01-01 RX ADMIN — AMIODARONE HYDROCHLORIDE 400 MG: 200 TABLET ORAL at 08:34

## 2022-01-01 RX ADMIN — HEPARIN SODIUM 1800 UNITS/HR: 10000 INJECTION, SOLUTION INTRAVENOUS at 16:34

## 2022-01-01 RX ADMIN — Medication 2 PACKET: at 11:43

## 2022-01-01 RX ADMIN — PIPERACILLIN AND TAZOBACTAM 3.38 G: 3; .375 INJECTION, POWDER, LYOPHILIZED, FOR SOLUTION INTRAVENOUS at 15:20

## 2022-01-01 RX ADMIN — MULTIVITAMIN 15 ML: LIQUID ORAL at 08:08

## 2022-01-01 RX ADMIN — PROPOFOL 25 MCG/KG/MIN: 10 INJECTION, EMULSION INTRAVENOUS at 13:19

## 2022-01-01 RX ADMIN — HUMAN INSULIN 10 UNITS/HR: 100 INJECTION, SOLUTION SUBCUTANEOUS at 07:43

## 2022-01-01 RX ADMIN — SENNOSIDES AND DOCUSATE SODIUM 2 TABLET: 8.6; 5 TABLET ORAL at 19:54

## 2022-01-01 RX ADMIN — MIDAZOLAM HYDROCHLORIDE 12 MG/HR: 1 INJECTION, SOLUTION INTRAVENOUS at 14:09

## 2022-01-01 RX ADMIN — HUMAN INSULIN 3 UNITS/HR: 100 INJECTION, SOLUTION SUBCUTANEOUS at 00:55

## 2022-01-01 RX ADMIN — PROPOFOL 50 MCG/KG/MIN: 10 INJECTION, EMULSION INTRAVENOUS at 04:43

## 2022-01-01 RX ADMIN — HYDROCHLOROTHIAZIDE 25 MG: 25 TABLET ORAL at 13:08

## 2022-01-01 RX ADMIN — LEVETIRACETAM 2000 MG: 100 INJECTION, SOLUTION INTRAVENOUS at 07:38

## 2022-01-01 RX ADMIN — TICAGRELOR 90 MG: 90 TABLET ORAL at 07:49

## 2022-01-01 RX ADMIN — MULTIVITAMIN 15 ML: LIQUID ORAL at 07:41

## 2022-01-01 RX ADMIN — ALBUMIN (HUMAN) 12.5 G: 12.5 SOLUTION INTRAVENOUS at 00:21

## 2022-01-01 RX ADMIN — PROPOFOL 40 MCG/KG/MIN: 10 INJECTION, EMULSION INTRAVENOUS at 17:39

## 2022-01-01 RX ADMIN — VALPROATE SODIUM 1000 MG: 100 INJECTION, SOLUTION INTRAVENOUS at 21:13

## 2022-01-01 RX ADMIN — PIPERACILLIN AND TAZOBACTAM 3.38 G: 3; .375 INJECTION, POWDER, LYOPHILIZED, FOR SOLUTION INTRAVENOUS at 23:46

## 2022-01-01 RX ADMIN — PIPERACILLIN AND TAZOBACTAM 3.38 G: 3; .375 INJECTION, POWDER, LYOPHILIZED, FOR SOLUTION INTRAVENOUS at 22:08

## 2022-01-01 RX ADMIN — AMIODARONE HYDROCHLORIDE 1 MG/MIN: 50 INJECTION, SOLUTION INTRAVENOUS at 22:57

## 2022-01-01 RX ADMIN — Medication 100 MCG/HR: at 21:16

## 2022-01-01 RX ADMIN — MIDAZOLAM HYDROCHLORIDE 10 MG/HR: 1 INJECTION, SOLUTION INTRAVENOUS at 13:52

## 2022-01-01 RX ADMIN — MULTIVITAMIN 15 ML: LIQUID ORAL at 08:05

## 2022-01-01 RX ADMIN — MIDAZOLAM 3 MG: 1 INJECTION INTRAMUSCULAR; INTRAVENOUS at 18:30

## 2022-01-01 RX ADMIN — LEVETIRACETAM 1500 MG: 15 INJECTION INTRAVENOUS at 19:23

## 2022-01-01 RX ADMIN — VASOPRESSIN 2.4 UNITS/HR: 20 INJECTION INTRAVENOUS at 02:46

## 2022-01-01 RX ADMIN — THROMBIN, TOPICAL (BOVINE): KIT at 10:39

## 2022-01-01 RX ADMIN — Medication 150 MCG/HR: at 13:51

## 2022-01-01 RX ADMIN — OXYMETAZOLINE HYDROCHLORIDE 2 SPRAY: 0.05 SPRAY NASAL at 04:00

## 2022-01-01 RX ADMIN — PROPOFOL 50 MCG/KG/MIN: 10 INJECTION, EMULSION INTRAVENOUS at 08:03

## 2022-01-01 RX ADMIN — MIDAZOLAM 10 MG: 1 INJECTION INTRAMUSCULAR; INTRAVENOUS at 05:00

## 2022-01-01 RX ADMIN — POTASSIUM CHLORIDE 20 MEQ: 29.8 INJECTION, SOLUTION INTRAVENOUS at 23:20

## 2022-01-01 RX ADMIN — INSULIN GLARGINE 10 UNITS: 100 INJECTION, SOLUTION SUBCUTANEOUS at 21:22

## 2022-01-01 RX ADMIN — MIDAZOLAM HYDROCHLORIDE 8 MG/HR: 1 INJECTION, SOLUTION INTRAVENOUS at 08:17

## 2022-01-01 RX ADMIN — VALPROATE SODIUM 1000 MG: 100 INJECTION, SOLUTION INTRAVENOUS at 20:06

## 2022-01-01 RX ADMIN — SODIUM CHLORIDE, POTASSIUM CHLORIDE, SODIUM LACTATE AND CALCIUM CHLORIDE 500 ML: 600; 310; 30; 20 INJECTION, SOLUTION INTRAVENOUS at 22:45

## 2022-01-01 RX ADMIN — Medication 2 PACKET: at 07:59

## 2022-01-01 RX ADMIN — PROPOFOL 20 MCG/KG/MIN: 10 INJECTION, EMULSION INTRAVENOUS at 21:26

## 2022-01-01 RX ADMIN — HUMAN INSULIN 7 UNITS/HR: 100 INJECTION, SOLUTION SUBCUTANEOUS at 12:37

## 2022-01-01 RX ADMIN — ASPIRIN 81 MG CHEWABLE TABLET 81 MG: 81 TABLET CHEWABLE at 08:05

## 2022-01-01 RX ADMIN — EPINEPHRINE 0.03 MCG/KG/MIN: 1 INJECTION INTRAMUSCULAR; INTRAVENOUS; SUBCUTANEOUS at 17:38

## 2022-01-01 RX ADMIN — HUMAN ALBUMIN MICROSPHERES AND PERFLUTREN 5 ML: 10; .22 INJECTION, SOLUTION INTRAVENOUS at 12:09

## 2022-01-01 RX ADMIN — ACETAMINOPHEN 650 MG: 325 TABLET, FILM COATED ORAL at 17:33

## 2022-01-01 RX ADMIN — MAGNESIUM SULFATE IN WATER 2 G: 40 INJECTION, SOLUTION INTRAVENOUS at 13:17

## 2022-01-01 RX ADMIN — PROTAMINE SULFATE 50 MG: 10 INJECTION, SOLUTION INTRAVENOUS at 16:18

## 2022-01-01 RX ADMIN — VALPROATE SODIUM 1000 MG: 100 INJECTION, SOLUTION INTRAVENOUS at 11:44

## 2022-01-01 RX ADMIN — POTASSIUM CHLORIDE 20 MEQ: 29.8 INJECTION, SOLUTION INTRAVENOUS at 15:23

## 2022-01-01 RX ADMIN — PIPERACILLIN AND TAZOBACTAM 3.38 G: 3; .375 INJECTION, POWDER, LYOPHILIZED, FOR SOLUTION INTRAVENOUS at 05:10

## 2022-01-01 RX ADMIN — LEVETIRACETAM 2000 MG: 100 INJECTION, SOLUTION INTRAVENOUS at 19:32

## 2022-01-01 RX ADMIN — OXYMETAZOLINE HYDROCHLORIDE 2 SPRAY: 0.05 SPRAY NASAL at 11:33

## 2022-01-01 RX ADMIN — HUMAN INSULIN 5 UNITS/HR: 100 INJECTION, SOLUTION SUBCUTANEOUS at 04:40

## 2022-01-01 RX ADMIN — HEPARIN SODIUM AND DEXTROSE 1450 UNITS/HR: 10000; 5 INJECTION INTRAVENOUS at 00:55

## 2022-01-01 RX ADMIN — HUMAN INSULIN 4 UNITS/HR: 100 INJECTION, SOLUTION SUBCUTANEOUS at 08:42

## 2022-01-01 RX ADMIN — Medication 100 MCG/HR: at 20:02

## 2022-01-01 RX ADMIN — THIAMINE HCL TAB 100 MG 100 MG: 100 TAB at 08:05

## 2022-01-01 RX ADMIN — Medication 150 MCG/HR: at 14:50

## 2022-01-01 RX ADMIN — CANGRELOR 4 MCG/KG/MIN: 50 INJECTION, POWDER, LYOPHILIZED, FOR SOLUTION INTRAVENOUS at 18:10

## 2022-01-01 RX ADMIN — CLOBAZAM 10 MG: 10 TABLET ORAL at 08:06

## 2022-01-01 RX ADMIN — PANTOPRAZOLE SODIUM 40 MG: 40 INJECTION, POWDER, FOR SOLUTION INTRAVENOUS at 07:37

## 2022-01-01 RX ADMIN — PROPOFOL 40 MCG/KG/MIN: 10 INJECTION, EMULSION INTRAVENOUS at 13:21

## 2022-01-01 RX ADMIN — CLOBAZAM 5 MG: 10 TABLET ORAL at 19:58

## 2022-01-01 RX ADMIN — Medication 50 MCG: at 21:30

## 2022-01-01 RX ADMIN — OXYMETAZOLINE HYDROCHLORIDE 2 SPRAY: 0.05 SPRAY NASAL at 19:55

## 2022-01-01 RX ADMIN — LEVETIRACETAM 2000 MG: 100 INJECTION, SOLUTION INTRAVENOUS at 20:06

## 2022-01-01 RX ADMIN — TICAGRELOR 90 MG: 90 TABLET ORAL at 07:41

## 2022-01-01 RX ADMIN — LIDOCAINE HYDROCHLORIDE 1 MG/MIN: 8 INJECTION, SOLUTION INTRAVENOUS at 05:08

## 2022-01-01 RX ADMIN — FENTANYL CITRATE 100 MCG: 50 INJECTION, SOLUTION INTRAMUSCULAR; INTRAVENOUS at 10:48

## 2022-01-01 RX ADMIN — HUMAN ALBUMIN MICROSPHERES AND PERFLUTREN 5 ML: 10; .22 INJECTION, SOLUTION INTRAVENOUS at 08:21

## 2022-01-01 RX ADMIN — POTASSIUM CHLORIDE 20 MEQ: 20 SOLUTION ORAL at 19:33

## 2022-01-01 RX ADMIN — PROPOFOL 25 MCG/KG/MIN: 10 INJECTION, EMULSION INTRAVENOUS at 01:19

## 2022-01-01 RX ADMIN — CLOBAZAM 10 MG: 10 TABLET ORAL at 19:32

## 2022-01-01 RX ADMIN — HEPARIN SODIUM AND DEXTROSE 1800 UNITS/HR: 10000; 5 INJECTION INTRAVENOUS at 11:43

## 2022-01-01 RX ADMIN — Medication 1000 UNITS: at 02:21

## 2022-01-01 RX ADMIN — HUMAN INSULIN 10 UNITS/HR: 100 INJECTION, SOLUTION SUBCUTANEOUS at 01:07

## 2022-01-01 RX ADMIN — PROPOFOL 50 MCG/KG/MIN: 10 INJECTION, EMULSION INTRAVENOUS at 06:35

## 2022-01-01 RX ADMIN — Medication 2 PACKET: at 08:42

## 2022-01-01 RX ADMIN — Medication 100 MCG/HR: at 18:44

## 2022-01-01 RX ADMIN — LIDOCAINE HYDROCHLORIDE 100 MG: 20 INJECTION INTRAVENOUS at 02:00

## 2022-01-01 RX ADMIN — CALCIUM CHLORIDE 1 G: 100 INJECTION, SOLUTION INTRAVENOUS at 18:47

## 2022-01-01 RX ADMIN — MULTIVITAMIN 15 ML: LIQUID ORAL at 07:42

## 2022-01-01 RX ADMIN — OXYMETAZOLINE HYDROCHLORIDE 2 SPRAY: 0.05 SPRAY NASAL at 00:03

## 2022-01-01 RX ADMIN — PROPOFOL 25 MCG/KG/MIN: 10 INJECTION, EMULSION INTRAVENOUS at 14:20

## 2022-01-01 RX ADMIN — PROPOFOL 40 MCG/KG/MIN: 10 INJECTION, EMULSION INTRAVENOUS at 10:17

## 2022-01-01 RX ADMIN — HUMAN INSULIN 3 UNITS/HR: 100 INJECTION, SOLUTION SUBCUTANEOUS at 06:44

## 2022-01-01 RX ADMIN — SODIUM CHLORIDE, SODIUM GLUCONATE, SODIUM ACETATE, POTASSIUM CHLORIDE AND MAGNESIUM CHLORIDE: 526; 502; 368; 37; 30 INJECTION, SOLUTION INTRAVENOUS at 13:55

## 2022-01-01 RX ADMIN — TICAGRELOR 90 MG: 90 TABLET ORAL at 20:27

## 2022-01-01 RX ADMIN — Medication 40 MG: at 08:03

## 2022-01-01 RX ADMIN — POLYETHYLENE GLYCOL 3350 17 G: 17 POWDER, FOR SOLUTION ORAL at 19:32

## 2022-01-01 RX ADMIN — POLYETHYLENE GLYCOL 3350 17 G: 17 POWDER, FOR SOLUTION ORAL at 08:05

## 2022-01-01 RX ADMIN — MIDAZOLAM HYDROCHLORIDE 12 MG/HR: 1 INJECTION, SOLUTION INTRAVENOUS at 23:36

## 2022-01-01 RX ADMIN — Medication 2 PACKET: at 08:02

## 2022-01-01 RX ADMIN — FUROSEMIDE 60 MG: 10 INJECTION, SOLUTION INTRAVENOUS at 20:06

## 2022-01-01 RX ADMIN — AMIODARONE HYDROCHLORIDE 150 MG: 1.5 INJECTION, SOLUTION INTRAVENOUS at 03:00

## 2022-01-01 RX ADMIN — MULTIVITAMIN 15 ML: LIQUID ORAL at 07:57

## 2022-01-01 RX ADMIN — VALPROATE SODIUM 1000 MG: 100 INJECTION, SOLUTION INTRAVENOUS at 03:35

## 2022-01-01 RX ADMIN — EPINEPHRINE 0.02 MCG/KG/MIN: 1 INJECTION INTRAMUSCULAR; INTRAVENOUS; SUBCUTANEOUS at 19:55

## 2022-01-01 RX ADMIN — Medication 150 MCG/HR: at 07:46

## 2022-01-01 RX ADMIN — MIDAZOLAM HYDROCHLORIDE 12 MG/HR: 1 INJECTION, SOLUTION INTRAVENOUS at 23:23

## 2022-01-01 RX ADMIN — SENNOSIDES AND DOCUSATE SODIUM 2 TABLET: 8.6; 5 TABLET ORAL at 22:04

## 2022-01-01 RX ADMIN — Medication: at 20:49

## 2022-01-01 RX ADMIN — TICAGRELOR 90 MG: 90 TABLET ORAL at 20:31

## 2022-01-01 RX ADMIN — HUMAN INSULIN 7 UNITS/HR: 100 INJECTION, SOLUTION SUBCUTANEOUS at 20:42

## 2022-01-01 RX ADMIN — Medication 1000 UNITS: at 20:18

## 2022-01-01 RX ADMIN — PIPERACILLIN AND TAZOBACTAM 3.38 G: 3; .375 INJECTION, POWDER, LYOPHILIZED, FOR SOLUTION INTRAVENOUS at 03:56

## 2022-01-01 RX ADMIN — VANCOMYCIN HYDROCHLORIDE 2000 MG: 1 INJECTION, POWDER, LYOPHILIZED, FOR SOLUTION INTRAVENOUS at 22:23

## 2022-01-01 RX ADMIN — HUMAN INSULIN 4 UNITS/HR: 100 INJECTION, SOLUTION SUBCUTANEOUS at 02:59

## 2022-01-01 RX ADMIN — POTASSIUM CHLORIDE 20 MEQ: 29.8 INJECTION, SOLUTION INTRAVENOUS at 16:52

## 2022-01-01 RX ADMIN — Medication 50 MG: at 05:00

## 2022-01-01 RX ADMIN — SODIUM CHLORIDE 750 MG: 9 INJECTION, SOLUTION INTRAVENOUS at 04:41

## 2022-01-01 RX ADMIN — Medication 2 PACKET: at 16:06

## 2022-01-01 RX ADMIN — HUMAN INSULIN 7 UNITS/HR: 100 INJECTION, SOLUTION SUBCUTANEOUS at 18:43

## 2022-01-01 RX ADMIN — HEPARIN SODIUM 2000 UNITS/HR: 10000 INJECTION, SOLUTION INTRAVENOUS at 02:21

## 2022-01-01 RX ADMIN — LEVETIRACETAM 2000 MG: 100 INJECTION, SOLUTION INTRAVENOUS at 08:13

## 2022-01-01 RX ADMIN — OXYMETAZOLINE HYDROCHLORIDE 2 SPRAY: 0.05 SPRAY NASAL at 03:59

## 2022-01-01 RX ADMIN — HUMAN INSULIN 3 UNITS/HR: 100 INJECTION, SOLUTION SUBCUTANEOUS at 22:19

## 2022-01-01 RX ADMIN — SENNOSIDES AND DOCUSATE SODIUM 2 TABLET: 8.6; 5 TABLET ORAL at 07:58

## 2022-01-01 RX ADMIN — Medication 50 MCG: at 02:00

## 2022-01-01 RX ADMIN — SENNOSIDES AND DOCUSATE SODIUM 2 TABLET: 8.6; 5 TABLET ORAL at 20:06

## 2022-01-01 RX ADMIN — Medication 0.1 MCG/KG/MIN: at 03:33

## 2022-01-01 RX ADMIN — Medication 40 MG: at 08:18

## 2022-01-01 RX ADMIN — PIPERACILLIN AND TAZOBACTAM 3.38 G: 3; .375 INJECTION, POWDER, LYOPHILIZED, FOR SOLUTION INTRAVENOUS at 18:19

## 2022-01-01 RX ADMIN — PROPOFOL 10 MCG/KG/MIN: 10 INJECTION, EMULSION INTRAVENOUS at 08:05

## 2022-01-01 RX ADMIN — AMIODARONE HYDROCHLORIDE 400 MG: 200 TABLET ORAL at 20:34

## 2022-01-01 RX ADMIN — PROPOFOL 50 MCG/KG/MIN: 10 INJECTION, EMULSION INTRAVENOUS at 18:50

## 2022-01-01 RX ADMIN — CLOBAZAM 10 MG: 10 TABLET ORAL at 07:58

## 2022-01-01 RX ADMIN — PROPOFOL 25 MCG/KG/MIN: 10 INJECTION, EMULSION INTRAVENOUS at 21:03

## 2022-01-01 RX ADMIN — OXYMETAZOLINE HYDROCHLORIDE 2 SPRAY: 0.05 SPRAY NASAL at 00:00

## 2022-01-01 RX ADMIN — AMIODARONE HYDROCHLORIDE 400 MG: 200 TABLET ORAL at 07:40

## 2022-01-01 RX ADMIN — Medication 2 PACKET: at 13:59

## 2022-01-01 RX ADMIN — OXYMETAZOLINE HYDROCHLORIDE 2 SPRAY: 0.05 SPRAY NASAL at 19:24

## 2022-01-01 RX ADMIN — Medication 2 PACKET: at 07:41

## 2022-01-01 RX ADMIN — PROPOFOL 20 MCG/KG/MIN: 10 INJECTION, EMULSION INTRAVENOUS at 17:23

## 2022-01-01 RX ADMIN — PROPOFOL 50 MCG/KG/MIN: 10 INJECTION, EMULSION INTRAVENOUS at 01:32

## 2022-01-01 RX ADMIN — TICAGRELOR 90 MG: 90 TABLET ORAL at 20:06

## 2022-01-01 RX ADMIN — VALPROATE SODIUM 1000 MG: 100 INJECTION, SOLUTION INTRAVENOUS at 19:55

## 2022-01-01 RX ADMIN — Medication: at 12:05

## 2022-01-01 RX ADMIN — PIPERACILLIN AND TAZOBACTAM 3.38 G: 3; .375 INJECTION, POWDER, LYOPHILIZED, FOR SOLUTION INTRAVENOUS at 16:32

## 2022-01-01 RX ADMIN — HUMAN INSULIN 10 UNITS/HR: 100 INJECTION, SOLUTION SUBCUTANEOUS at 06:12

## 2022-01-01 RX ADMIN — PROPOFOL 50 MCG/KG/MIN: 10 INJECTION, EMULSION INTRAVENOUS at 11:38

## 2022-01-01 RX ADMIN — ACETAMINOPHEN 650 MG: 325 TABLET, FILM COATED ORAL at 20:06

## 2022-01-01 RX ADMIN — ACETAMINOPHEN 650 MG: 325 TABLET, FILM COATED ORAL at 02:20

## 2022-01-01 RX ADMIN — Medication 20 MG: at 17:09

## 2022-01-01 RX ADMIN — PIPERACILLIN AND TAZOBACTAM 3.38 G: 3; .375 INJECTION, POWDER, LYOPHILIZED, FOR SOLUTION INTRAVENOUS at 16:15

## 2022-01-01 RX ADMIN — ACETAZOLAMIDE 500 MG: 500 INJECTION, POWDER, LYOPHILIZED, FOR SOLUTION INTRAVENOUS at 11:47

## 2022-01-01 RX ADMIN — VALPROATE SODIUM 1000 MG: 100 INJECTION, SOLUTION INTRAVENOUS at 04:04

## 2022-01-01 RX ADMIN — MIDAZOLAM HYDROCHLORIDE 4 MG/HR: 1 INJECTION, SOLUTION INTRAVENOUS at 19:55

## 2022-01-01 RX ADMIN — Medication 2 PACKET: at 15:59

## 2022-01-01 RX ADMIN — MIDAZOLAM 4 MG: 1 INJECTION INTRAMUSCULAR; INTRAVENOUS at 11:34

## 2022-01-01 RX ADMIN — Medication 2 PACKET: at 19:24

## 2022-01-01 RX ADMIN — HUMAN INSULIN 6 UNITS/HR: 100 INJECTION, SOLUTION SUBCUTANEOUS at 06:09

## 2022-01-01 RX ADMIN — SENNOSIDES AND DOCUSATE SODIUM 2 TABLET: 8.6; 5 TABLET ORAL at 07:40

## 2022-01-01 RX ADMIN — Medication 2 PACKET: at 16:31

## 2022-01-01 RX ADMIN — CLOBAZAM 10 MG: 10 TABLET ORAL at 19:55

## 2022-01-01 RX ADMIN — PROPOFOL 30 MCG/KG/MIN: 10 INJECTION, EMULSION INTRAVENOUS at 10:54

## 2022-01-01 RX ADMIN — Medication 150 MCG/HR: at 22:44

## 2022-01-01 RX ADMIN — PROPOFOL 20 MCG/KG/MIN: 10 INJECTION, EMULSION INTRAVENOUS at 07:36

## 2022-01-01 RX ADMIN — PROPOFOL 40 MCG/KG/MIN: 10 INJECTION, EMULSION INTRAVENOUS at 21:19

## 2022-01-01 RX ADMIN — PROPOFOL 25 MCG/KG/MIN: 10 INJECTION, EMULSION INTRAVENOUS at 19:33

## 2022-01-01 RX ADMIN — VALPROATE SODIUM 1000 MG: 100 INJECTION, SOLUTION INTRAVENOUS at 12:59

## 2022-01-01 RX ADMIN — Medication 2 PACKET: at 19:59

## 2022-01-01 RX ADMIN — MULTIVITAMIN 15 ML: LIQUID ORAL at 07:40

## 2022-01-01 RX ADMIN — EPINEPHRINE 10 MCG: 1 INJECTION INTRAMUSCULAR; INTRAVENOUS; SUBCUTANEOUS at 17:33

## 2022-01-01 RX ADMIN — FUROSEMIDE 60 MG: 10 INJECTION, SOLUTION INTRAMUSCULAR; INTRAVENOUS at 15:58

## 2022-01-01 RX ADMIN — LEVETIRACETAM 2000 MG: 100 INJECTION, SOLUTION INTRAVENOUS at 19:55

## 2022-01-01 RX ADMIN — AMIODARONE HYDROCHLORIDE 150 MG: 1.5 INJECTION, SOLUTION INTRAVENOUS at 09:35

## 2022-01-01 RX ADMIN — POLYETHYLENE GLYCOL 3350 17 G: 17 POWDER, FOR SOLUTION ORAL at 07:58

## 2022-01-01 RX ADMIN — HEPARIN SODIUM 1800 UNITS/HR: 10000 INJECTION, SOLUTION INTRAVENOUS at 01:33

## 2022-01-01 RX ADMIN — SENNOSIDES AND DOCUSATE SODIUM 2 TABLET: 8.6; 5 TABLET ORAL at 22:19

## 2022-01-01 RX ADMIN — SODIUM CHLORIDE 3000 MG: 9 INJECTION, SOLUTION INTRAVENOUS at 11:30

## 2022-01-01 RX ADMIN — THIAMINE HCL TAB 100 MG 100 MG: 100 TAB at 07:43

## 2022-01-01 RX ADMIN — LEVETIRACETAM 2000 MG: 100 INJECTION, SOLUTION INTRAVENOUS at 08:04

## 2022-01-01 RX ADMIN — SUGAMMADEX 200 MG: 100 INJECTION, SOLUTION INTRAVENOUS at 17:51

## 2022-01-01 RX ADMIN — Medication 100 MG: at 17:35

## 2022-01-01 RX ADMIN — TICAGRELOR 90 MG: 90 TABLET ORAL at 19:34

## 2022-01-01 RX ADMIN — Medication 1000 UNITS: at 00:16

## 2022-01-01 RX ADMIN — PROPOFOL 50 MCG/KG/MIN: 10 INJECTION, EMULSION INTRAVENOUS at 22:28

## 2022-01-01 RX ADMIN — POTASSIUM CHLORIDE 20 MEQ: 29.8 INJECTION, SOLUTION INTRAVENOUS at 22:10

## 2022-01-01 RX ADMIN — BISACODYL 10 MG: 10 SUPPOSITORY RECTAL at 14:34

## 2022-01-01 RX ADMIN — OXYMETAZOLINE HYDROCHLORIDE 2 SPRAY: 0.05 SPRAY NASAL at 19:33

## 2022-01-01 RX ADMIN — LEVETIRACETAM 1500 MG: 15 INJECTION INTRAVENOUS at 08:04

## 2022-01-01 RX ADMIN — MULTIVITAMIN 15 ML: LIQUID ORAL at 08:36

## 2022-01-01 RX ADMIN — SENNOSIDES AND DOCUSATE SODIUM 2 TABLET: 8.6; 5 TABLET ORAL at 19:32

## 2022-01-01 RX ADMIN — HEPARIN SODIUM AND DEXTROSE 1750 UNITS/HR: 10000; 5 INJECTION INTRAVENOUS at 10:18

## 2022-01-01 RX ADMIN — Medication 100 MCG/HR: at 22:09

## 2022-01-01 RX ADMIN — ASPIRIN 81 MG CHEWABLE TABLET 81 MG: 81 TABLET CHEWABLE at 08:06

## 2022-01-01 RX ADMIN — MIDAZOLAM HYDROCHLORIDE 8 MG/HR: 1 INJECTION, SOLUTION INTRAVENOUS at 21:18

## 2022-01-01 RX ADMIN — IBUPROFEN 200 MG: 200 TABLET, FILM COATED ORAL at 01:47

## 2022-01-01 RX ADMIN — PROPOFOL 20 MCG/KG/MIN: 10 INJECTION, EMULSION INTRAVENOUS at 02:23

## 2022-01-01 RX ADMIN — PIPERACILLIN AND TAZOBACTAM 3.38 G: 3; .375 INJECTION, POWDER, LYOPHILIZED, FOR SOLUTION INTRAVENOUS at 21:19

## 2022-01-01 RX ADMIN — PIPERACILLIN AND TAZOBACTAM 3.38 G: 3; .375 INJECTION, POWDER, LYOPHILIZED, FOR SOLUTION INTRAVENOUS at 04:32

## 2022-01-01 RX ADMIN — SENNOSIDES AND DOCUSATE SODIUM 2 TABLET: 8.6; 5 TABLET ORAL at 19:58

## 2022-01-01 RX ADMIN — ASPIRIN 81 MG CHEWABLE TABLET 81 MG: 81 TABLET CHEWABLE at 08:17

## 2022-01-01 RX ADMIN — POTASSIUM CHLORIDE 20 MEQ: 1.5 POWDER, FOR SOLUTION ORAL at 23:31

## 2022-01-01 RX ADMIN — LEVETIRACETAM 1500 MG: 15 INJECTION INTRAVENOUS at 07:41

## 2022-01-01 RX ADMIN — PROPOFOL 50 MCG/KG/MIN: 10 INJECTION, EMULSION INTRAVENOUS at 00:24

## 2022-01-01 RX ADMIN — NOREPINEPHRINE BITARTRATE 12.8 MCG: 1 INJECTION, SOLUTION, CONCENTRATE INTRAVENOUS at 17:35

## 2022-01-01 RX ADMIN — Medication 2 PACKET: at 19:55

## 2022-01-01 RX ADMIN — ASPIRIN 81 MG CHEWABLE TABLET 81 MG: 81 TABLET CHEWABLE at 07:47

## 2022-01-01 RX ADMIN — Medication 2 PACKET: at 08:06

## 2022-01-01 RX ADMIN — MIDAZOLAM 4 MG: 1 INJECTION INTRAMUSCULAR; INTRAVENOUS at 22:42

## 2022-01-01 RX ADMIN — OXYMETAZOLINE HYDROCHLORIDE 2 SPRAY: 0.05 SPRAY NASAL at 00:05

## 2022-01-01 RX ADMIN — Medication 40 MG: at 08:36

## 2022-01-01 RX ADMIN — ACETAMINOPHEN 650 MG: 325 TABLET, FILM COATED ORAL at 00:03

## 2022-01-01 RX ADMIN — AMIODARONE HYDROCHLORIDE 400 MG: 200 TABLET ORAL at 08:03

## 2022-01-01 RX ADMIN — GLYCOPYRROLATE 0.2 MG: 0.2 INJECTION INTRAMUSCULAR; INTRAVENOUS at 09:45

## 2022-01-01 RX ADMIN — SODIUM CHLORIDE, POTASSIUM CHLORIDE, SODIUM LACTATE AND CALCIUM CHLORIDE 250 ML: 600; 310; 30; 20 INJECTION, SOLUTION INTRAVENOUS at 14:37

## 2022-01-01 RX ADMIN — EPINEPHRINE 10 MCG: 1 INJECTION INTRAMUSCULAR; INTRAVENOUS; SUBCUTANEOUS at 17:42

## 2022-01-01 RX ADMIN — Medication 0.02 MCG/KG/MIN: at 14:20

## 2022-01-01 RX ADMIN — PROPOFOL 25 MCG/KG/MIN: 10 INJECTION, EMULSION INTRAVENOUS at 13:29

## 2022-01-01 RX ADMIN — VALPROATE SODIUM 1000 MG: 100 INJECTION, SOLUTION INTRAVENOUS at 04:03

## 2022-01-01 RX ADMIN — VALPROATE SODIUM 1000 MG: 100 INJECTION, SOLUTION INTRAVENOUS at 20:16

## 2022-01-01 RX ADMIN — TICAGRELOR 90 MG: 90 TABLET ORAL at 19:57

## 2022-01-01 RX ADMIN — LEVETIRACETAM 2000 MG: 100 INJECTION, SOLUTION INTRAVENOUS at 07:46

## 2022-01-01 RX ADMIN — PROPOFOL 25 MCG/KG/MIN: 10 INJECTION, EMULSION INTRAVENOUS at 17:21

## 2022-01-01 RX ADMIN — FUROSEMIDE 60 MG: 10 INJECTION, SOLUTION INTRAVENOUS at 13:08

## 2022-01-01 RX ADMIN — HUMAN INSULIN 3.5 UNITS/HR: 100 INJECTION, SOLUTION SUBCUTANEOUS at 05:23

## 2022-01-01 RX ADMIN — LEVETIRACETAM 2000 MG: 100 INJECTION, SOLUTION INTRAVENOUS at 20:23

## 2022-01-01 RX ADMIN — HUMAN INSULIN 6 UNITS/HR: 100 INJECTION, SOLUTION SUBCUTANEOUS at 18:26

## 2022-01-01 RX ADMIN — OXYMETAZOLINE HYDROCHLORIDE 2 SPRAY: 0.05 SPRAY NASAL at 08:36

## 2022-01-01 RX ADMIN — HUMAN INSULIN 11 UNITS/HR: 100 INJECTION, SOLUTION SUBCUTANEOUS at 21:23

## 2022-01-01 RX ADMIN — Medication 40 MG: at 08:46

## 2022-01-01 RX ADMIN — VALPROATE SODIUM 1000 MG: 100 INJECTION, SOLUTION INTRAVENOUS at 03:49

## 2022-01-01 RX ADMIN — PROPOFOL 25 MCG/KG/MIN: 10 INJECTION, EMULSION INTRAVENOUS at 08:07

## 2022-01-01 RX ADMIN — PROPOFOL 25 MCG/KG/MIN: 10 INJECTION, EMULSION INTRAVENOUS at 08:48

## 2022-01-01 RX ADMIN — TICAGRELOR 90 MG: 90 TABLET ORAL at 19:32

## 2022-01-01 RX ADMIN — Medication 2 PACKET: at 19:33

## 2022-01-01 RX ADMIN — ASPIRIN 81 MG CHEWABLE TABLET 81 MG: 81 TABLET CHEWABLE at 08:34

## 2022-01-01 RX ADMIN — FUROSEMIDE 60 MG: 10 INJECTION, SOLUTION INTRAMUSCULAR; INTRAVENOUS at 14:19

## 2022-01-01 RX ADMIN — OXYMETAZOLINE HYDROCHLORIDE 2 SPRAY: 0.05 SPRAY NASAL at 20:28

## 2022-01-01 RX ADMIN — Medication 1000 UNITS: at 02:13

## 2022-01-01 RX ADMIN — OXYMETAZOLINE HYDROCHLORIDE 2 SPRAY: 0.05 SPRAY NASAL at 16:31

## 2022-01-01 RX ADMIN — Medication 100 MCG/HR: at 21:17

## 2022-01-01 RX ADMIN — Medication 40 MG: at 07:41

## 2022-01-01 RX ADMIN — PROPOFOL 30 MCG/KG/MIN: 10 INJECTION, EMULSION INTRAVENOUS at 16:00

## 2022-01-01 RX ADMIN — HEPARIN SODIUM 1800 UNITS/HR: 10000 INJECTION, SOLUTION INTRAVENOUS at 12:12

## 2022-01-01 RX ADMIN — MIDAZOLAM HYDROCHLORIDE 6 MG/HR: 1 INJECTION, SOLUTION INTRAVENOUS at 05:46

## 2022-01-01 RX ADMIN — LEVETIRACETAM 2000 MG: 100 INJECTION, SOLUTION INTRAVENOUS at 20:25

## 2022-01-01 RX ADMIN — AMIODARONE HYDROCHLORIDE 400 MG: 200 TABLET ORAL at 07:48

## 2022-01-01 RX ADMIN — MIDAZOLAM HYDROCHLORIDE 4 MG: 1 INJECTION, SOLUTION INTRAMUSCULAR; INTRAVENOUS at 18:34

## 2022-01-01 RX ADMIN — POTASSIUM CHLORIDE 20 MEQ: 40 SOLUTION ORAL at 08:06

## 2022-01-01 RX ADMIN — HUMAN INSULIN 16 UNITS/HR: 100 INJECTION, SOLUTION SUBCUTANEOUS at 03:01

## 2022-01-01 RX ADMIN — FENTANYL CITRATE 100 MCG: 50 INJECTION, SOLUTION INTRAMUSCULAR; INTRAVENOUS at 10:41

## 2022-01-01 RX ADMIN — MAGNESIUM SULFATE HEPTAHYDRATE 2 G: 40 INJECTION, SOLUTION INTRAVENOUS at 18:12

## 2022-01-01 RX ADMIN — THIAMINE HCL TAB 100 MG 100 MG: 100 TAB at 08:03

## 2022-01-01 RX ADMIN — Medication 150 MCG/HR: at 06:08

## 2022-01-01 RX ADMIN — Medication 2 PACKET: at 20:34

## 2022-01-01 RX ADMIN — GADOBUTROL 10 ML: 604.72 INJECTION INTRAVENOUS at 17:49

## 2022-01-01 RX ADMIN — OXYMETAZOLINE HYDROCHLORIDE 2 SPRAY: 0.05 SPRAY NASAL at 04:33

## 2022-01-01 RX ADMIN — PROPOFOL 20 MCG/KG/MIN: 10 INJECTION, EMULSION INTRAVENOUS at 08:28

## 2022-01-01 RX ADMIN — OXYMETAZOLINE HYDROCHLORIDE 3 SPRAY: 0.05 SPRAY NASAL at 10:39

## 2022-01-01 RX ADMIN — MIDAZOLAM HYDROCHLORIDE 6 MG/HR: 1 INJECTION, SOLUTION INTRAVENOUS at 14:20

## 2022-01-01 RX ADMIN — Medication 40 MG: at 08:05

## 2022-01-01 RX ADMIN — SENNOSIDES AND DOCUSATE SODIUM 2 TABLET: 8.6; 5 TABLET ORAL at 20:28

## 2022-01-01 RX ADMIN — PIPERACILLIN AND TAZOBACTAM 3.38 G: 3; .375 INJECTION, POWDER, LYOPHILIZED, FOR SOLUTION INTRAVENOUS at 10:16

## 2022-01-01 RX ADMIN — LEVETIRACETAM 1000 MG: 10 INJECTION INTRAVENOUS at 08:36

## 2022-01-01 RX ADMIN — MAGNESIUM HYDROXIDE 30 ML: 400 SUSPENSION ORAL at 07:40

## 2022-01-01 RX ADMIN — PROPOFOL 25 MCG/KG/MIN: 10 INJECTION, EMULSION INTRAVENOUS at 06:16

## 2022-01-01 RX ADMIN — AMIODARONE HYDROCHLORIDE 400 MG: 200 TABLET ORAL at 07:49

## 2022-01-01 RX ADMIN — HUMAN INSULIN 8 UNITS/HR: 100 INJECTION, SOLUTION SUBCUTANEOUS at 11:41

## 2022-01-01 RX ADMIN — AMIODARONE HYDROCHLORIDE 400 MG: 200 TABLET ORAL at 19:58

## 2022-01-01 RX ADMIN — TICAGRELOR 90 MG: 90 TABLET ORAL at 07:37

## 2022-01-01 RX ADMIN — MIDAZOLAM HYDROCHLORIDE 12 MG/HR: 1 INJECTION, SOLUTION INTRAVENOUS at 23:01

## 2022-01-01 RX ADMIN — PROPOFOL 50 MCG/KG/MIN: 10 INJECTION, EMULSION INTRAVENOUS at 15:55

## 2022-01-01 RX ADMIN — ACETAMINOPHEN 650 MG: 325 TABLET, FILM COATED ORAL at 19:54

## 2022-01-01 RX ADMIN — OXYMETAZOLINE HYDROCHLORIDE 2 SPRAY: 0.05 SPRAY NASAL at 15:59

## 2022-01-01 RX ADMIN — LEVETIRACETAM 500 MG: 5 INJECTION INTRAVENOUS at 10:53

## 2022-01-01 RX ADMIN — PIPERACILLIN AND TAZOBACTAM 3.38 G: 3; .375 INJECTION, POWDER, LYOPHILIZED, FOR SOLUTION INTRAVENOUS at 04:43

## 2022-01-01 RX ADMIN — OXYMETAZOLINE HYDROCHLORIDE 2 SPRAY: 0.05 SPRAY NASAL at 07:49

## 2022-01-01 RX ADMIN — HEPARIN SODIUM AND DEXTROSE 1800 UNITS/HR: 10000; 5 INJECTION INTRAVENOUS at 23:01

## 2022-01-01 RX ADMIN — PIPERACILLIN AND TAZOBACTAM 3.38 G: 3; .375 INJECTION, POWDER, LYOPHILIZED, FOR SOLUTION INTRAVENOUS at 22:15

## 2022-01-01 RX ADMIN — MAGNESIUM HYDROXIDE 30 ML: 400 SUSPENSION ORAL at 18:42

## 2022-01-01 RX ADMIN — Medication 2 PACKET: at 11:22

## 2022-01-01 RX ADMIN — PROPOFOL 50 MCG/KG/MIN: 10 INJECTION, EMULSION INTRAVENOUS at 13:05

## 2022-01-01 RX ADMIN — PIPERACILLIN AND TAZOBACTAM 3.38 G: 3; .375 INJECTION, POWDER, LYOPHILIZED, FOR SOLUTION INTRAVENOUS at 03:06

## 2022-01-01 RX ADMIN — CALCIUM CHLORIDE 500 MG: 100 INJECTION INTRAVENOUS; INTRAVENTRICULAR at 17:20

## 2022-01-01 RX ADMIN — VALPROATE SODIUM 1000 MG: 100 INJECTION, SOLUTION INTRAVENOUS at 04:06

## 2022-01-01 RX ADMIN — PIPERACILLIN AND TAZOBACTAM 3.38 G: 3; .375 INJECTION, POWDER, LYOPHILIZED, FOR SOLUTION INTRAVENOUS at 22:06

## 2022-01-01 RX ADMIN — AMIODARONE HYDROCHLORIDE 1 MG/MIN: 50 INJECTION, SOLUTION INTRAVENOUS at 14:59

## 2022-01-01 RX ADMIN — Medication: at 04:43

## 2022-01-01 RX ADMIN — TICAGRELOR 90 MG: 90 TABLET ORAL at 07:58

## 2022-01-01 RX ADMIN — CLOBAZAM 10 MG: 10 TABLET ORAL at 07:40

## 2022-01-01 RX ADMIN — TICAGRELOR 90 MG: 90 TABLET ORAL at 07:40

## 2022-01-01 RX ADMIN — CLOBAZAM 5 MG: 10 TABLET ORAL at 20:35

## 2022-01-01 RX ADMIN — Medication 150 MCG/HR: at 10:28

## 2022-01-01 RX ADMIN — LEVETIRACETAM 2000 MG: 100 INJECTION, SOLUTION INTRAVENOUS at 07:40

## 2022-01-01 RX ADMIN — AMIODARONE HYDROCHLORIDE 400 MG: 200 TABLET ORAL at 07:41

## 2022-01-01 RX ADMIN — PROPOFOL 50 MCG/KG/MIN: 10 INJECTION, EMULSION INTRAVENOUS at 03:02

## 2022-01-01 RX ADMIN — Medication 2 PACKET: at 07:42

## 2022-01-01 RX ADMIN — Medication 50 MCG: at 19:24

## 2022-01-01 RX ADMIN — Medication 2 PACKET: at 12:00

## 2022-01-01 RX ADMIN — OXYMETAZOLINE HYDROCHLORIDE 2 SPRAY: 0.05 SPRAY NASAL at 07:42

## 2022-01-01 RX ADMIN — ASPIRIN 81 MG CHEWABLE TABLET 81 MG: 81 TABLET CHEWABLE at 07:37

## 2022-01-01 RX ADMIN — Medication 2 PACKET: at 20:09

## 2022-01-01 RX ADMIN — FUROSEMIDE 60 MG: 10 INJECTION, SOLUTION INTRAMUSCULAR; INTRAVENOUS at 09:44

## 2022-01-01 RX ADMIN — ACETAMINOPHEN 650 MG: 325 TABLET, FILM COATED ORAL at 04:18

## 2022-01-01 RX ADMIN — HEPARIN SODIUM 2000 UNITS/HR: 10000 INJECTION, SOLUTION INTRAVENOUS at 06:25

## 2022-01-01 RX ADMIN — HUMAN INSULIN 6 UNITS/HR: 100 INJECTION, SOLUTION SUBCUTANEOUS at 22:48

## 2022-01-01 RX ADMIN — HEPARIN SODIUM 2000 UNITS/HR: 10000 INJECTION, SOLUTION INTRAVENOUS at 15:49

## 2022-01-01 RX ADMIN — AMIODARONE HYDROCHLORIDE 400 MG: 200 TABLET ORAL at 07:42

## 2022-01-01 RX ADMIN — HUMAN INSULIN 8 UNITS/HR: 100 INJECTION, SOLUTION SUBCUTANEOUS at 11:51

## 2022-01-01 RX ADMIN — MIDAZOLAM HYDROCHLORIDE 6 MG/HR: 1 INJECTION, SOLUTION INTRAVENOUS at 21:18

## 2022-01-01 RX ADMIN — OXYMETAZOLINE HYDROCHLORIDE 2 SPRAY: 0.05 SPRAY NASAL at 16:15

## 2022-01-01 RX ADMIN — HUMAN INSULIN 10 UNITS/HR: 100 INJECTION, SOLUTION SUBCUTANEOUS at 13:05

## 2022-01-01 RX ADMIN — Medication 8 MG/HR: at 10:30

## 2022-01-01 RX ADMIN — HUMAN INSULIN 6 UNITS/HR: 100 INJECTION, SOLUTION SUBCUTANEOUS at 21:55

## 2022-01-01 RX ADMIN — SODIUM CHLORIDE 750 MG: 9 INJECTION, SOLUTION INTRAVENOUS at 19:49

## 2022-01-01 RX ADMIN — HUMAN INSULIN 11 UNITS/HR: 100 INJECTION, SOLUTION SUBCUTANEOUS at 16:28

## 2022-01-01 RX ADMIN — PROPOFOL 10 MCG/KG/MIN: 10 INJECTION, EMULSION INTRAVENOUS at 21:19

## 2022-01-01 RX ADMIN — MIDAZOLAM HYDROCHLORIDE 6 MG/HR: 1 INJECTION, SOLUTION INTRAVENOUS at 00:04

## 2022-01-01 RX ADMIN — SENNOSIDES AND DOCUSATE SODIUM 2 TABLET: 8.6; 5 TABLET ORAL at 07:49

## 2022-01-01 RX ADMIN — MIDAZOLAM 2 MG: 1 INJECTION INTRAMUSCULAR; INTRAVENOUS at 02:00

## 2022-01-01 RX ADMIN — THIAMINE HCL TAB 100 MG 100 MG: 100 TAB at 07:58

## 2022-01-01 RX ADMIN — Medication 75 MCG/HR: at 02:34

## 2022-01-01 RX ADMIN — Medication 2 PACKET: at 16:17

## 2022-01-01 RX ADMIN — VALPROATE SODIUM 1000 MG: 100 INJECTION, SOLUTION INTRAVENOUS at 20:35

## 2022-01-01 RX ADMIN — TICAGRELOR 90 MG: 90 TABLET ORAL at 08:03

## 2022-01-01 RX ADMIN — HUMAN INSULIN 7 UNITS/HR: 100 INJECTION, SOLUTION SUBCUTANEOUS at 01:32

## 2022-01-01 RX ADMIN — Medication 40 MG: at 08:04

## 2022-01-01 RX ADMIN — PIPERACILLIN AND TAZOBACTAM 3.38 G: 3; .375 INJECTION, POWDER, LYOPHILIZED, FOR SOLUTION INTRAVENOUS at 09:09

## 2022-01-01 RX ADMIN — CLOBAZAM 5 MG: 10 TABLET ORAL at 12:50

## 2022-01-01 RX ADMIN — MIDAZOLAM HYDROCHLORIDE 12 MG/HR: 1 INJECTION, SOLUTION INTRAVENOUS at 07:26

## 2022-01-01 RX ADMIN — Medication: at 19:57

## 2022-01-01 RX ADMIN — INSULIN GLARGINE 10 UNITS: 100 INJECTION, SOLUTION SUBCUTANEOUS at 22:14

## 2022-01-01 RX ADMIN — SENNOSIDES AND DOCUSATE SODIUM 2 TABLET: 8.6; 5 TABLET ORAL at 07:48

## 2022-01-01 RX ADMIN — VALPROATE SODIUM 1000 MG: 100 INJECTION, SOLUTION INTRAVENOUS at 04:19

## 2022-01-01 RX ADMIN — TICAGRELOR 90 MG: 90 TABLET ORAL at 08:34

## 2022-01-01 RX ADMIN — Medication 2 PACKET: at 13:15

## 2022-01-01 RX ADMIN — VALPROATE SODIUM 1000 MG: 100 INJECTION, SOLUTION INTRAVENOUS at 12:00

## 2022-01-01 RX ADMIN — Medication 40 MG: at 07:57

## 2022-01-01 RX ADMIN — HYDROCHLOROTHIAZIDE 50 MG: 25 TABLET ORAL at 11:47

## 2022-01-01 RX ADMIN — NOREPINEPHRINE BITARTRATE 6.4 MCG: 1 INJECTION, SOLUTION, CONCENTRATE INTRAVENOUS at 17:52

## 2022-01-01 RX ADMIN — Medication 1000 UNITS: at 00:04

## 2022-01-01 RX ADMIN — CLOBAZAM 10 MG: 10 TABLET ORAL at 20:06

## 2022-01-01 RX ADMIN — THIAMINE HCL TAB 100 MG 100 MG: 100 TAB at 15:20

## 2022-01-01 RX ADMIN — LEVETIRACETAM 1000 MG: 10 INJECTION INTRAVENOUS at 20:12

## 2022-01-01 RX ADMIN — TICAGRELOR 90 MG: 90 TABLET ORAL at 20:12

## 2022-01-01 RX ADMIN — MIDAZOLAM HYDROCHLORIDE 4 MG: 1 INJECTION, SOLUTION INTRAMUSCULAR; INTRAVENOUS at 04:26

## 2022-01-01 RX ADMIN — TICAGRELOR 90 MG: 90 TABLET ORAL at 20:34

## 2022-01-01 RX ADMIN — MIDAZOLAM 4 MG: 1 INJECTION INTRAMUSCULAR; INTRAVENOUS at 10:42

## 2022-01-01 RX ADMIN — SENNOSIDES AND DOCUSATE SODIUM 2 TABLET: 8.6; 5 TABLET ORAL at 21:23

## 2022-01-01 RX ADMIN — MIDAZOLAM HYDROCHLORIDE 12 MG/HR: 1 INJECTION, SOLUTION INTRAVENOUS at 00:22

## 2022-01-01 RX ADMIN — PROPOFOL 20 MCG/KG/MIN: 10 INJECTION, EMULSION INTRAVENOUS at 00:45

## 2022-01-01 RX ADMIN — Medication 40 MG: at 08:06

## 2022-01-01 RX ADMIN — Medication 50 MG: at 15:55

## 2022-01-01 RX ADMIN — PIPERACILLIN AND TAZOBACTAM 3.38 G: 3; .375 INJECTION, POWDER, LYOPHILIZED, FOR SOLUTION INTRAVENOUS at 11:21

## 2022-01-01 RX ADMIN — TICAGRELOR 90 MG: 90 TABLET ORAL at 19:23

## 2022-01-01 RX ADMIN — FUROSEMIDE 60 MG: 10 INJECTION, SOLUTION INTRAMUSCULAR; INTRAVENOUS at 16:16

## 2022-01-01 RX ADMIN — PROPOFOL 25 MCG/KG/MIN: 10 INJECTION, EMULSION INTRAVENOUS at 23:54

## 2022-01-01 RX ADMIN — ASPIRIN 81 MG CHEWABLE TABLET 81 MG: 81 TABLET CHEWABLE at 08:03

## 2022-01-01 RX ADMIN — THIAMINE HCL TAB 100 MG 100 MG: 100 TAB at 07:41

## 2022-01-01 RX ADMIN — Medication 2 PACKET: at 20:13

## 2022-01-01 RX ADMIN — POLYETHYLENE GLYCOL 3350 17 G: 17 POWDER, FOR SOLUTION ORAL at 10:54

## 2022-01-01 RX ADMIN — MULTIVITAMIN 15 ML: LIQUID ORAL at 10:39

## 2022-01-01 RX ADMIN — THIAMINE HCL TAB 100 MG 100 MG: 100 TAB at 08:06

## 2022-01-01 RX ADMIN — Medication 200 MCG/HR: at 07:43

## 2022-01-01 RX ADMIN — HUMAN INSULIN 2 UNITS/HR: 100 INJECTION, SOLUTION SUBCUTANEOUS at 10:24

## 2022-01-01 RX ADMIN — Medication 2 G: at 14:15

## 2022-01-01 RX ADMIN — VALPROATE SODIUM 1000 MG: 100 INJECTION, SOLUTION INTRAVENOUS at 12:17

## 2022-01-01 RX ADMIN — PIPERACILLIN AND TAZOBACTAM 3.38 G: 3; .375 INJECTION, POWDER, LYOPHILIZED, FOR SOLUTION INTRAVENOUS at 16:39

## 2022-01-01 RX ADMIN — Medication 1000 UNITS: at 20:00

## 2022-01-01 RX ADMIN — AMIODARONE HYDROCHLORIDE 400 MG: 200 TABLET ORAL at 07:58

## 2022-01-01 RX ADMIN — PIPERACILLIN AND TAZOBACTAM 3.38 G: 3; .375 INJECTION, POWDER, LYOPHILIZED, FOR SOLUTION INTRAVENOUS at 05:03

## 2022-01-01 RX ADMIN — SENNOSIDES AND DOCUSATE SODIUM 2 TABLET: 8.6; 5 TABLET ORAL at 08:06

## 2022-01-01 RX ADMIN — Medication 150 MCG/HR: at 17:52

## 2022-01-01 RX ADMIN — PIPERACILLIN AND TAZOBACTAM 3.38 G: 3; .375 INJECTION, POWDER, LYOPHILIZED, FOR SOLUTION INTRAVENOUS at 21:20

## 2022-01-01 RX ADMIN — Medication 2 PACKET: at 20:06

## 2022-01-01 RX ADMIN — LIDOCAINE HYDROCHLORIDE 1 MG/MIN: 8 INJECTION, SOLUTION INTRAVENOUS at 02:04

## 2022-01-01 RX ADMIN — PROPOFOL 30 MCG/KG/MIN: 10 INJECTION, EMULSION INTRAVENOUS at 00:10

## 2022-01-01 RX ADMIN — HEPARIN SODIUM 2000 UNITS/HR: 10000 INJECTION, SOLUTION INTRAVENOUS at 19:39

## 2022-01-01 RX ADMIN — POTASSIUM CHLORIDE 20 MEQ: 1.5 POWDER, FOR SOLUTION ORAL at 18:33

## 2022-01-01 RX ADMIN — TICAGRELOR 90 MG: 90 TABLET ORAL at 20:48

## 2022-01-01 RX ADMIN — PROPOFOL 40 MCG/KG/MIN: 10 INJECTION, EMULSION INTRAVENOUS at 06:15

## 2022-01-01 RX ADMIN — MULTIVITAMIN 15 ML: LIQUID ORAL at 07:49

## 2022-01-01 RX ADMIN — Medication 100 MG: at 13:55

## 2022-01-01 RX ADMIN — HUMAN INSULIN 3 UNITS/HR: 100 INJECTION, SOLUTION SUBCUTANEOUS at 22:40

## 2022-01-01 RX ADMIN — POLYETHYLENE GLYCOL 3350 17 G: 17 POWDER, FOR SOLUTION ORAL at 07:40

## 2022-01-01 RX ADMIN — Medication 50 MCG: at 18:31

## 2022-01-01 RX ADMIN — POTASSIUM CHLORIDE 20 MEQ: 1.5 POWDER, FOR SOLUTION ORAL at 04:53

## 2022-01-01 RX ADMIN — POLYETHYLENE GLYCOL 3350 17 G: 17 POWDER, FOR SOLUTION ORAL at 07:49

## 2022-01-01 RX ADMIN — VALPROATE SODIUM 1000 MG: 100 INJECTION, SOLUTION INTRAVENOUS at 12:41

## 2022-01-01 RX ADMIN — OXYMETAZOLINE HYDROCHLORIDE 2 SPRAY: 0.05 SPRAY NASAL at 12:39

## 2022-01-01 ASSESSMENT — ACTIVITIES OF DAILY LIVING (ADL)
ADLS_ACUITY_SCORE: 35
ADLS_ACUITY_SCORE: 32
ADLS_ACUITY_SCORE: 43
ADLS_ACUITY_SCORE: 32
ADLS_ACUITY_SCORE: 32
ADLS_ACUITY_SCORE: 51
ADLS_ACUITY_SCORE: 32
ADLS_ACUITY_SCORE: 45
ADLS_ACUITY_SCORE: 47
ADLS_ACUITY_SCORE: 32
WALKING_OR_CLIMBING_STAIRS_DIFFICULTY: NO
ADLS_ACUITY_SCORE: 32
ADLS_ACUITY_SCORE: 45
ADLS_ACUITY_SCORE: 32
ADLS_ACUITY_SCORE: 47
DRESSING/BATHING_DIFFICULTY: NO
ADLS_ACUITY_SCORE: 32
ADLS_ACUITY_SCORE: 49
ADLS_ACUITY_SCORE: 32
ADLS_ACUITY_SCORE: 47
ADLS_ACUITY_SCORE: 32
ADLS_ACUITY_SCORE: 47
ADLS_ACUITY_SCORE: 32
ADLS_ACUITY_SCORE: 45
ADLS_ACUITY_SCORE: 32
ADLS_ACUITY_SCORE: 32
DIFFICULTY_EATING/SWALLOWING: OTHER (SEE COMMENTS)
ADLS_ACUITY_SCORE: 49
ADLS_ACUITY_SCORE: 51
ADLS_ACUITY_SCORE: 32
ADLS_ACUITY_SCORE: 49
ADLS_ACUITY_SCORE: 32
ADLS_ACUITY_SCORE: 49
TOILETING_ISSUES: NO
ADLS_ACUITY_SCORE: 49
DRESSING/BATHING_DIFFICULTY: OTHER (SEE COMMENTS)
ADLS_ACUITY_SCORE: 32
WEAR_GLASSES_OR_BLIND: NO
ADLS_ACUITY_SCORE: 49
ADLS_ACUITY_SCORE: 32
ADLS_ACUITY_SCORE: 32
ADLS_ACUITY_SCORE: 49
ADLS_ACUITY_SCORE: 32
ADLS_ACUITY_SCORE: 45
ADLS_ACUITY_SCORE: 49
ADLS_ACUITY_SCORE: 49
ADLS_ACUITY_SCORE: 32
ADLS_ACUITY_SCORE: 49
ADLS_ACUITY_SCORE: 32
ADLS_ACUITY_SCORE: 32
ADLS_ACUITY_SCORE: 47
ADLS_ACUITY_SCORE: 49
ADLS_ACUITY_SCORE: 47
ADLS_ACUITY_SCORE: 47
ADLS_ACUITY_SCORE: 32
ADLS_ACUITY_SCORE: 47
ADLS_ACUITY_SCORE: 45
ADLS_ACUITY_SCORE: 47
ADLS_ACUITY_SCORE: 32
FALL_HISTORY_WITHIN_LAST_SIX_MONTHS: NO
ADLS_ACUITY_SCORE: 49
ADLS_ACUITY_SCORE: 35
ADLS_ACUITY_SCORE: 49
ADLS_ACUITY_SCORE: 32
ADLS_ACUITY_SCORE: 47
CONCENTRATING,_REMEMBERING_OR_MAKING_DECISIONS_DIFFICULTY: NO
ADLS_ACUITY_SCORE: 49
ADLS_ACUITY_SCORE: 32
ADLS_ACUITY_SCORE: 32
ADLS_ACUITY_SCORE: 49
ADLS_ACUITY_SCORE: 45
ADLS_ACUITY_SCORE: 32
ADLS_ACUITY_SCORE: 49
ADLS_ACUITY_SCORE: 32
ADLS_ACUITY_SCORE: 32
ADLS_ACUITY_SCORE: 45
ADLS_ACUITY_SCORE: 32
ADLS_ACUITY_SCORE: 32
ADLS_ACUITY_SCORE: 45
ADLS_ACUITY_SCORE: 32
ADLS_ACUITY_SCORE: 47
ADLS_ACUITY_SCORE: 49
ADLS_ACUITY_SCORE: 32
ADLS_ACUITY_SCORE: 47
ADLS_ACUITY_SCORE: 47
ADLS_ACUITY_SCORE: 45
ADLS_ACUITY_SCORE: 49
ADLS_ACUITY_SCORE: 47
ADLS_ACUITY_SCORE: 32
ADLS_ACUITY_SCORE: 45
WALKING_OR_CLIMBING_STAIRS_DIFFICULTY: OTHER (SEE COMMENTS)
ADLS_ACUITY_SCORE: 45
ADLS_ACUITY_SCORE: 51
ADLS_ACUITY_SCORE: 32
ADLS_ACUITY_SCORE: 47
ADLS_ACUITY_SCORE: 32
ADLS_ACUITY_SCORE: 32
ADLS_ACUITY_SCORE: 47
ADLS_ACUITY_SCORE: 45
ADLS_ACUITY_SCORE: 49
ADLS_ACUITY_SCORE: 32
ADLS_ACUITY_SCORE: 49
ADLS_ACUITY_SCORE: 32
ADLS_ACUITY_SCORE: 49
ADLS_ACUITY_SCORE: 47
ADLS_ACUITY_SCORE: 32
ADLS_ACUITY_SCORE: 32
ADLS_ACUITY_SCORE: 47
ADLS_ACUITY_SCORE: 51
ADLS_ACUITY_SCORE: 47
ADLS_ACUITY_SCORE: 32
ADLS_ACUITY_SCORE: 32
ADLS_ACUITY_SCORE: 49
ADLS_ACUITY_SCORE: 32
ADLS_ACUITY_SCORE: 49
ADLS_ACUITY_SCORE: 45
ADLS_ACUITY_SCORE: 49
ADLS_ACUITY_SCORE: 32
CONCENTRATING,_REMEMBERING_OR_MAKING_DECISIONS_DIFFICULTY: OTHER (SEE COMMENTS)
ADLS_ACUITY_SCORE: 47
ADLS_ACUITY_SCORE: 49
ADLS_ACUITY_SCORE: 32
ADLS_ACUITY_SCORE: 49
ADLS_ACUITY_SCORE: 45
DOING_ERRANDS_INDEPENDENTLY_DIFFICULTY: OTHER (SEE COMMENTS)
ADLS_ACUITY_SCORE: 45
ADLS_ACUITY_SCORE: 32
ADLS_ACUITY_SCORE: 49
ADLS_ACUITY_SCORE: 49
DIFFICULTY_EATING/SWALLOWING: NO
ADLS_ACUITY_SCORE: 32
ADLS_ACUITY_SCORE: 47
CHANGE_IN_FUNCTIONAL_STATUS_SINCE_ONSET_OF_CURRENT_ILLNESS/INJURY: NO
ADLS_ACUITY_SCORE: 32
WEAR_GLASSES_OR_BLIND: OTHER (SEE COMMENTS)
ADLS_ACUITY_SCORE: 32
ADLS_ACUITY_SCORE: 47
ADLS_ACUITY_SCORE: 49
TOILETING_ISSUES: OTHER (SEE COMMENTS)
ADLS_ACUITY_SCORE: 32
ADLS_ACUITY_SCORE: 47
ADLS_ACUITY_SCORE: 32
DOING_ERRANDS_INDEPENDENTLY_DIFFICULTY: NO
ADLS_ACUITY_SCORE: 32
ADLS_ACUITY_SCORE: 32
ADLS_ACUITY_SCORE: 49
ADLS_ACUITY_SCORE: 32
ADLS_ACUITY_SCORE: 45
ADLS_ACUITY_SCORE: 32

## 2022-09-12 PROBLEM — I21.3 ACUTE ST ELEVATION MYOCARDIAL INFARCTION (STEMI) (H): Status: ACTIVE | Noted: 2022-01-01

## 2022-09-12 NOTE — Clinical Note
The first balloon was inserted into the circumflex.Max pressure = 14 nikki. Total duration = 10 seconds.     Max pressure = 14 nikki. Total duration = 10 seconds.    Balloon reinflated a second time: Max pressure = 14 nikki. Total duration = 10 seconds.  Balloon reinflated a third time: Max pressure = 14 nikki. Total duration = 10 seconds.

## 2022-09-12 NOTE — Clinical Note
Max pressure = 12 nikki. Total duration = 21 seconds.     Max pressure = 12 nikki. Total duration = 11 seconds.    Balloon reinflated a second time: Max pressure = 12 nikki. Total duration = 11 seconds.  Balloon reinflated a third time: Max pressure = 12 nikki. Total duration = 8 seconds.

## 2022-09-12 NOTE — Clinical Note
Max pressure = 14 nikki. Total duration = 10 seconds.     Max pressure = 14 nikki. Total duration = 10 seconds.    Balloon reinflated a second time: Max pressure = 14 nikki. Total duration = 10 seconds.  Balloon reinflated a third time: Max pressure = 14 nikki. Total duration = 9 seconds.  Balloon reinflated a fourth time: Max pressure = 14 nikki. Total duration = 40 seconds.

## 2022-09-12 NOTE — Clinical Note
Stent deployed in the circumflex. Max pressure = 14 nikki. Total duration = 13 seconds. Balloon reinflated a second time: Max pressure = 14 nikki. Total duration = 10 seconds.

## 2022-09-12 NOTE — Clinical Note
The first balloon was inserted into the left anterior descending and middle left anterior descending.Max pressure = 14 nikki. Total duration = 10 seconds.     Max pressure = 14 nikki. Total duration = 10 seconds.    Balloon reinflated a second time: Max pressure = 14 nikki. Total duration = 10 seconds.  Balloon reinflated a third time: Max pressure = 14 nikki. Total duration = 10 seconds.

## 2022-09-12 NOTE — Clinical Note
The first balloon was inserted into the circumflex.Max pressure = 10 nikki. Total duration = 10 seconds.     The second balloon was inserted into the Other. Max pressure = 10 nikki. Total duration = 10 seconds.   Max pressure = 10 nikki. Total duration = 10 seconds.

## 2022-09-12 NOTE — Clinical Note
Max pressure = 12 nikki. Total duration = 15 seconds.     Max pressure = 12 nikki. Total duration = 12 seconds.    Balloon reinflated a second time: Max pressure = 12 nikki. Total duration = 12 seconds.  Balloon reinflated a third time: Max pressure = 12 nikki. Total duration = 10 seconds.  Balloon reinflated a fourth time: Max pressure = 12 nikki. Total duration = 9 seconds.

## 2022-09-12 NOTE — Clinical Note
The first balloon was inserted into the right coronary artery and proximal right coronary artery.Max pressure = 10 nikki. Total duration = 8 seconds.     Max pressure = 12 nikki. Total duration = 35 seconds.    Balloon reinflated a second time: Max pressure = 12 nikki. Total duration = 35 seconds.

## 2022-09-12 NOTE — Clinical Note
IABP inserted in the right femoral artery. IABP inserted with 50 cc balloon volume Lot number is: [id number]

## 2022-09-12 NOTE — ED PROVIDER NOTES
ED Provider Note  [facility]      History     Chief Complaint   Patient presents with     Cardiac Arrest     HPI  Jessi Alessia Jackson is a 89 year old male who presents to the emergency department via EMS today with cardiac arrest.  Evidently the patient was at his place of employment, [employer], and coworkers found him hanging from a forklift.  He was working with the forklift and had evidently fallen, he had a harness on and was caught on it - it was somewhat bunched around his neck per coworkers.  They managed to cut him out of his harness and lowered him to the floor. He was unresponsive.  911 was called.  An AED was placed at the scene and advised to shock.  Shortly thereafter paramedics responded and he was unresponsive and found him to be in ventricular fibrillation.  They gave an additional shock and 1 mg of epinephrine as well as a dose of amiodarone.  Shortly thereafter they obtained ROSC.  An I-gel airway was placed by paramedics.  He has an IO in his left lower leg.    Paramedics report that he was breathing on his own but otherwise has been unresponsive.  We do not have any medical history on the patient.    PMH - unknown    FH - unknown    PSH - unknown    Past Medical and Surgical History, Medications, Allergies, and Social History were reviewed in the electronic medical record. Review with the patient was attempted but limited due to altered mental status.       Review of Systems   Unable to perform ROS: Intubated         Physical Exam   BP: (!) 141/112  Physical Exam  Vitals and nursing note reviewed.   Constitutional:       Comments: Obese older adult male, being bagged, breathing spontaneously but otherwise unresponsive/not volitionally moving   HENT:      Head: Normocephalic.      Mouth/Throat:      Comments: I-gel airway in place  Eyes:      Comments: Fixed and dilated pupils    Neck:      Comments: No sign of strangulation or markings on anterior  neck  Cardiovascular:      Rate and Rhythm: Tachycardia present.      Pulses: Normal pulses.   Pulmonary:      Comments: Being assisted with ventilation by bag-valve-mask but he is spontaneously breathing    Diminished breath sounds B, no wheezing or rhonchi appreciated  Abdominal:      Comments: Obese, soft, quiet bowel sounds   Genitourinary:     Comments: Incontinent of urine  Skin:     Coloration: Skin is pale.   Neurological:      Comments: Not responsive to painful stimuli - GCS 3 but he is spontaneously breathing         ED Course      Procedures        Critical care: Based upon patient's evaluation he is critically ill and does require critical care.  Approximately 20 minutes is spent in prehospital notification, assembling team, assessment, reassessment, record review, coordination of care, entering interpretation of labs, and documentation.    The medical record was reviewed and interpreted.         Lactate elevated due to cardiac arrest, no concern for sepsis at this time.     Results for orders placed or performed during the hospital encounter of 09/12/22   [place] Draw     Status: None (In process)    Narrative    The following orders were created for panel order [place] Draw.  Procedure                               Abnormality         Status                     ---------                               -----------         ------                     Extra Blue Top Tube[[id number]]                              In process                 Extra Red Top Tube[[id number]]                               In process                 Extra Green Top (Lithium...[[id number]]                      In process                 Extra Purple Top Tube[[id number]]                            In process                   Please view results for these tests on the individual orders.   iStat Gases (lactate) venous, POCT     Status: Abnormal   Result Value Ref Range    Lactic Acid POCT 6.9 (HH) <=2.0 mmol/L    Bicarbonate Venous POCT  16 (L) 21 - 28 mmol/L    O2 Sat, Venous POCT 95 94 - 100 %    pCO2V Venous POCT 31 (L) 40 - 50 mm Hg    pH Venous POCT 7.31 (L) 7.32 - 7.43    pO2 Venous POCT 81 (H) 25 - 47 mm Hg   iStat Gases Electrolytes ICA Glucose Venous, POCT     Status: Abnormal   Result Value Ref Range    CPB Applied No     Hematocrit POCT 38 (L) 40 - 53 %    Calcium, Ionized Whole Blood POCT 4.2 (L) 4.4 - 5.2 mg/dL    Glucose Whole Blood POCT 308 (H) 70 - 99 mg/dL    Bicarbonate Venous POCT 17 (L) 21 - 28 mmol/L    Hemoglobin POCT 12.9 (L) 13.3 - 17.7 g/dL    Potassium POCT 2.7 (L) 3.4 - 5.3 mmol/L    Sodium POCT 139 133 - 144 mmol/L    pCO2 Venous POCT 32 (L) 40 - 50 mm Hg    pO2 Venous POCT 84 (H) 25 - 47 mm Hg    pH Venous POCT 7.32 7.32 - 7.43    O2 Sat, Venous POCT 96 94 - 100 %   Arterial Panel POCT     Status: Abnormal   Result Value Ref Range    pH Arterial POCT 7.32 (L) 7.35 - 7.45    pCO2 Arterial POCT 36 35 - 45 mm Hg    pO2 Arterial POCT 590 (H) 80 - 105 mm Hg    Bicarbonate Arterial POCT 18 (L) 21 - 28 mmol/L    Sodium POCT 138 133 - 144 mmol/L    Potassium POCT 3.0 (L) 3.5 - 5.0 mmol/L    Hemoglobin POCT 14.1 13.3 - 17.7 g/dL    Glucose Whole Blood POCT 338 (H) 70 - 99 mg/dL    Calcium, Ionized Whole Blood POCT 4.4 4.4 - 5.2 mg/dL    Base Excess/Deficit (+/-) POCT -7.1 -9.6 - 2.0 mmol/L    FIO2 POCT 100.0 %    Lactic Acid POCT 6.8 (HH) <=2.0 mmol/L   Oxyhemoglobin, arterial POCT     Status: Normal   Result Value Ref Range    Oxyhemoglobin POCT 100 92 - 100 %     Medications   midazolam (VERSED) injection (4 mg Intravenous Given 9/12/22 1738)   fentaNYL (PF) (SUBLIMAZE) injection (100 mcg Intravenous Given 9/12/22 1738)        Assessments & Plan (with Medical Decision Making)   Patient presents to the emergency department today via EMS with cardiac arrest.  The presenting story is somewhat confusing, sounds as if he was found hanging from a forklift, with his harness being caught.  Bystanders reported that it perhaps was  tangled around his neck somewhat, and they were able to cut him out of the harness and lower him to the ground.  Onsite AED advised shock, and paramedics found him to be in ventricular fibrillation.    It is unclear if he perhaps had a cardiac event which caused him to fall, versus him falling and potentially having some strangulation event.  This is based on bystander report of harness was tangled around his neck somewhat.  I do not see any sign of ligature marks around the neck.  Initial rhythm was ventricular fibrillation, highly suspicious for primary cardiac event.    We did activate the Cath Lab and evaluated the patient in the ED.  He is saturating 100% on the ventilator.  He is somewhat hypertensive and tachycardic in the 110 range.  He is spontaneously breathing, and per respiratory is pulling quite large tidal volumes, but he is not spontaneously moving or otherwise responsive.  We did establish IV access and we did draw blood for laboratory analysis.  I-STAT lactate is 6.9 (expected) pH is really surprisingly quite good at 7.31, PCO2 of 31, PO2 of 81.  I-STAT electrolytes show hypokalemia with a potassium of 2.7, sodium 139, calcium 4.2, glucose 308, bicarb 17, hemoglobin 12.9.  Additional labs have been ordered and sent to the lab for further evaluation.    Cardiology did come to the bedside to see the patient, and elected to take him expeditiously to the Cath Lab.  They are aware that he has an I gel airway, not an endotracheal tube.  Patient was quickly moved to the cardiac Cath Lab for cardiac intervention.     I have reviewed the nursing notes. I have reviewed the findings, diagnosis, plan and need for follow up with the patient.    There are no discharge medications for this patient.      Final diagnoses:   Ventricular fibrillation (H)   Cardiac arrest (H)       --  Shawna Leroy MD  [facility], located within [facility] EMERGENCY DEPARTMENT  9/12/2022     Shawna Leroy MD  09/12/22 6450

## 2022-09-12 NOTE — Clinical Note
Arrives from  with RN and ECMO specialists x2. RT. Upon arrival making intermittent jerking motions of upper and lower extremities. 4mg bolus of Versed given, which at the time of this note has ceased all seizure like activity. Currently Propofol at 20 mcg/kg/min, Versed gtt at 8 mg.hr, Amio gtt at 1 mg/kg/min, Insulin 1 unit/hr. Vent

## 2022-09-12 NOTE — Clinical Note
The first balloon was inserted into the circumflex.Max pressure = 6 nikki. Total duration = 10 seconds.

## 2022-09-12 NOTE — Clinical Note
The DP pulses are detected w/ doppler bilaterally.  Verbal/written post procedure instructions were given to patient/caregiver./Instructed patient/caregiver to follow-up with primary care physician./Instructed patient/caregiver regarding signs and symptoms of infection.

## 2022-09-12 NOTE — Clinical Note
Stent deployed in the middle left anterior descending. Max pressure = 10 nikki. Total duration = 12 seconds.

## 2022-09-12 NOTE — Clinical Note
Max pressure = 12 nikki. Total duration = 10 seconds.     Max pressure = 12 nikki. Total duration = 10 seconds.    Balloon reinflated a second time: Max pressure = 12 nikki. Total duration = 10 seconds.

## 2022-09-12 NOTE — Clinical Note
The first balloon was inserted into the left anterior descending and middle left anterior descending.Max pressure = 10 nikki. Total duration = 10 seconds.     Max pressure = 10 nikki. Total duration = 10 seconds.    Balloon reinflated a second time: Max pressure = 10 nikki. Total duration = 10 seconds.

## 2022-09-12 NOTE — Clinical Note
Max pressure = 12 nikki. Total duration = 14 seconds.     Max pressure = 12 nikki. Total duration = 10 seconds.    Balloon reinflated a second time: Max pressure = 12 nikki. Total duration = 10 seconds.  Balloon reinflated a third time: Max pressure = 12 nikki. Total duration = 13 seconds.  Balloon reinflated a fourth time: Max pressure = 12 nikki. Total duration = 10 seconds.

## 2022-09-12 NOTE — Clinical Note
Max pressure = 12 nikki. Total duration = 10 seconds.     Max pressure = 12 nikki. Total duration = 8 seconds.    Balloon reinflated a second time: Max pressure = 12 nikki. Total duration = 8 seconds.  Balloon reinflated a third time: Max pressure = 12 nikki. Total duration = 8 seconds.  Balloon reinflated a fourth time: Max pressure = 12 nikki. Total duration = 8 seconds.  Balloon reinflated a fourth time: Max pressure = 12 nikki. Total duration = 8 seconds.

## 2022-09-12 NOTE — ANESTHESIA PROCEDURE NOTES
Airway       Patient location during procedure: ED (cath lab 4)  Staff -        Resident/Fellow: Maria Teresa Wilson MD       CRNA: Sisi Delarosa APRN CRNA       Performed By: resident  Consent for Airway        Urgency: emergent       Consent: The procedure was performed in an emergent situation.  Indications and Patient Condition       Indications for airway management: cardiovascular arrest       Mallampati: Not Assessed       Mask difficulty assessment: 0 - not attempted    Final Airway Details       Final airway type: endotracheal airway       Successful airway: ETT - single  Endotracheal Airway Details        ETT size (mm): 8.0       Cuffed: yes       Successful intubation technique: video laryngoscopy       VL Blade Size: MAC 4       Grade View of Cords: 1 (long and curled epiglottis somewhat obscuring cords)       Adjucts: stylet       Position: Center       Measured from: gums/teeth       Secured at (cm): 24    Post intubation assessment        ETT secured, Vent settings by primary/ICU team, Sedation to be ordered by primary/ICU team, No apparent complications and Primary/ICU team to review CXR       Placement verified by: capnometry and chest rise        Number of attempts at approach: 1       Ease of procedure: easy       Dentition: Intact and Unchanged    Medication(s) Administered   rocuronium (ZEMURON) 10 mg/mL injection - Intravenous   100 mg - 9/12/2022 5:35:00 PM  Additional Comments       Pre medicated with Versed 4mg and fentanyl 100 mcg prior to rocuronium and intubation.

## 2022-09-12 NOTE — Clinical Note
Max pressure = 14 nikki. Total duration = 8 seconds.     Max pressure = 14 nikki. Total duration = 6 seconds.    Balloon reinflated a second time: Max pressure = 14 nikki. Total duration = 6 seconds. Max pressure = 14 nikki. Total duration = 8 seconds.  Balloon reinflated a fourth time: Max pressure = 14 nikki. Total duration = 30 seconds.

## 2022-09-12 NOTE — Clinical Note
Max pressure = 12 nikki. Total duration = 20 seconds.     Max pressure = 12 nikki. Total duration = 19 seconds.    Balloon reinflated a second time: Max pressure = 12 nikki. Total duration = 19 seconds.  Balloon reinflated a third time: Max pressure = 12 nikki. Total duration = 10 seconds.

## 2022-09-12 NOTE — PROGRESS NOTES
[facility]  Procedure Note           Intubation:       Jessi Jackson  MRN# [id number]   September 12, 2022, 6:00 PM Indication: Respiratory failure  Inability to protect airway           Patient intubated at: September 12, 2022, 5:45 PM           Cervical spine: Was not stabilized during the procedure   Sedative medication: Was administered during the procedure   Technique used: Direct laryngoscopy   Endotracheal tube size: 8.0 cm with cuff   Number of attempts: 1   Placement confirmed by: Auscultation of bilateral breath sounds  Visualization of bilateral chest wall rise  End-tidal CO2 monitor  Fluoro   ET tube repositioning: Was not performed   Tube secured at: 24 cm      This procedure was performed without difficulty and he tolerated the procedure fairly well with no complications.      Recorded by Maye Torres RT

## 2022-09-12 NOTE — Clinical Note
IABP inserted in the right femoral artery. IABP inserted with 50 cc balloon volume Lot number is: [id number]

## 2022-09-12 NOTE — Clinical Note
Patient in VFib on arrival to cath lab, shocked at 200J. Vfib terminated with return to sinus rhythm

## 2022-09-12 NOTE — Clinical Note
Max pressure = 10 nikki. Total duration = 12 seconds.     Max pressure = 12 nikki. Total duration = 8 seconds.    Balloon reinflated a second time: Max pressure = 12 nikki. Total duration = 8 seconds.

## 2022-09-12 NOTE — Clinical Note
Prepped: left groin. Prepped with: Betadine. The patient was draped. .Pre-procedure site marking:Insertion site not predetermined

## 2022-09-12 NOTE — H&P
[facility]  Interventional Cardiology History & Physical  2022     H&P:   Brian Brunner (1966) was admitted on 2022. Patient was found by a co-worker to be hanging from a forklift unresponsive. He was wearing a harness that was attached to the forklift. The harness was cut and the patient was lowered down to the ground. 911 was called. An AED was placed and patient received 1 shock. He also received 2 mg of epinephrine and 300 mg of Amiodarone. ROSC was obtained afterwards. EKG showed ST elevation in the later leads. Patient was brought to the [facility] for further evaluation/management.     Patient was brought to the cath lab. On angiogram he was noted to have mLAD 100% and underwent PCI. Pateint was admitted to the ICU for further management.    Witnessed arest (y/n): Y  Home or public location (y/n): Y  Bystander CPR (y/n): Y  Initial rhythm: Y  Did they have ROSC (y/n): Y  # of shocks: 1  Epi: 2  mg  Amio: 300 mg    Initial rhythm in the cath lab: Sinus rhythm.  First AB.30/43/120/21  First Lactate: 6.9    Assessment and Plan:     Mr. Stephens is a 66 y.o male who was found by a co-worker to be hanging from a forklift unresponsive. Initial rhythm VT/Vfib arrest s/p ROSC on the field. Underwent emergent coronary angiography s/p JAMES mid LAD (culprit lesion), Cx and OM2.     Neuro: #. At risk for anoxic brain injury  --Intubated, sedated, paralyzed. Cool to 33 degrees.  --pending formal initial head CT read   --permissive hypercapnia PCO2 40-50  --permissive hypernatremia  --continue versed, fentanyl, and cis as needed to maintain paralysis   --RASS goal -3 to -4   --CT head ordered     CV: #. VF arrest s/p ROSC.   #. STEMI s/p JAMES to LAD, Circumflex, and OM2   --Off pressors at the moment.   --IABP 1 to 1  --start nicardipine for MAP goal less than 100  --complete echo ordered  --continue ASA 81mg and ticagrelor 90mg BID  --hold temp at 33C  --hold  lipitor for now given likely hepatic injury during arrest  --hold ACE/ARB for now given likely reduced renal fxn after arrest  --holding beta blocker given shock      Pulm: #. Acute hypoxic respiratory failure  --Intubated.  --CXR ordered.  --Wean vent as able  --Daily CXR  --Q2h ABGs for now  --Consider scheduled duonebs if signs of lung dz, currently PRN       GI: --No known medical hx.   --monitor BID LFTs  --NPO while cooled   --Nutrition consult pending feeding tube placement once he is warmed   --Bowel regimen - on hold for now due to hypothermia  --GI Prophylaxis: PPI    Renal: #. MARIANA 2/2 cardiac arrest  --monitor urine output  --maintain K>3 and Mg>2      ID: #. Leukocytosis  --No signs of infection. Leukocytosis c/w arrest. Blood cultures collected.   --vancomycin/zosyn x5 days for presumed aspiration pneumonia  --monitor for signs of infection given cooling, lines, and leukocytosis     Hem/Onc: No acute issues.     Endo: No known medical history. BG elevated.  --start insulin drip  --f/u HgbA1c    Lines:   ETT September 12, 2022  Owens catheter September 12, 2022  OG tube September 12, 2022  Restraint: needed    Current lines are required for patient management       Code Status: Full    Jorge Reyes Castro, MD, MS  Cardiovascular disease fellow         Medications:   I have reviewed this patient's current medications    History reviewed. No pertinent past medical history.    History reviewed. No pertinent family history.  Social History     Socioeconomic History     Marital status: Not on file     Spouse name: Not on file     Number of children: Not on file     Years of education: Not on file     Highest education level: Not on file   Occupational History     Not on file   Tobacco Use     Smoking status: Not on file     Smokeless tobacco: Not on file   Substance and Sexual Activity     Alcohol use: Not on file     Drug use: Not on file     Sexual activity: Not on file   Other Topics Concern     Not on file    Social History Narrative     Not on file     Social Determinants of Health     Financial Resource Strain: Not on file   Food Insecurity: Not on file   Transportation Needs: Not on file   Physical Activity: Not on file   Stress: Not on file   Social Connections: Not on file   Intimate Partner Violence: Not on file   Housing Stability: Not on file        Review of Systems:     GENERAL APPEARANCE: Intubated, sedated. NAD.HEENT:  No icterus, PERRL 2 mm, ETT in place, OG tube in place  CARDIOVASCULAR: regular rate and rhythm, normal S1 and S2, no S3 or S4 and no murmur, click or rub. Normal PMI. Pulses dopplerable.  RESP: Coarse bilaterally. Mechanical ventilation.    GASTRO: Soft, bowel sounds hypoactive but present  GENITOURINARY: Owens in place.  EXTREMITIES: Cool, 1+ edema. ThermoGard as well as IABP in the right groin.  NEURO: Sedated and intubated, Pupils equal and reactive. Fent and Versed for sedation, as below.  INTEGUMENTARY: No rashes. Cannula/Line sites CDI  LINES/TUBES/DRAINS: R radial Arterial line. ETT. OG. Owens Catheter. IABP and thermogard in the right groin.     Arterial Blood Gas:   Recent Labs   Lab 09/12/22 1807 09/12/22 1743 09/12/22 1729 09/12/22 1716   PH 7.30* 7.30* 7.32* 7.31*   PCO2 43 39 36  --    PO2 120* 477* 590*  --    HCO3 21 19* 18*  --    O2PER 60.0 100.0 100.0  --      CXR: Pending   There were no vitals filed for this visit.No intake/output data recorded.  Recent Labs   Lab 09/12/22 1807 09/12/22 1743    141   POTASSIUM 3.8 3.3*   * 320*     No components found for: URINE   No lab results found in last 7 days.     No data recorded   Recent Labs   Lab 09/12/22 1807 09/12/22 1743 09/12/22 1729 09/12/22 1716   HGB 13.6 13.7 14.1 12.9*   HCT  --   --   --  38*     No lab results found in last 7 days.  Recent Labs   Lab 09/12/22 1807 09/12/22 1743 09/12/22 1729 09/12/22 1716   * 320* 338* 308*       Lines:           Data:   All lab results  reviewed    No results found for this or any previous visit (from the past 24 hour(s)).

## 2022-09-12 NOTE — Clinical Note
Potential access sites were evaluated for patency using ultrasound.   The left femoral artery and vein were selected. Access was obtained under with Sonosite guidance using a standard 18 guage needle with direct visualization of needle entry.

## 2022-09-12 NOTE — ED TRIAGE NOTES
Pt BIBA from [employer] where patient works. 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 Pt was hanging by harness and coworkers cut it off. Pt lost consciousness, CPR was started. Pt was in Vfib, 1 shock was delivered and 2 epi and 1 ami was given. Pt has fixed, open eyes upon arrival. Pt has left leg IO and 18 G in left wrist. Blood pressures have been in 140s systollically. Blood sugar was 267 by EMS.

## 2022-09-13 NOTE — PLAN OF CARE
Major Shift Events:  Pt cooled at 33 degrees. EEG began this AM. Pt sedated - able to open eyes and withdraw lower extremities from pain. SB with IABP on 1:1. MAPs 70-80s. Heparin initiated for LV thrombus. CMV settings on vent. LS coarse. Minimal UO - team aware.     Plan: Begin rewarming around 2100. Will attempt sedation vacation once rewarmed. Continue to monitor and notify team of acute changes.    For vital signs and complete assessments, please see documentation flowsheets.       Goal Outcome Evaluation:    Plan of Care Reviewed With: sibling     Overall Patient Progress: no change    Outcome Evaluation: Will begin re-warming tonight around 2100.

## 2022-09-13 NOTE — PROGRESS NOTES
Cardiology ICU Progress Note    Brief HPI:  Brian Brunner is a 61 year old male with unknown PMHx who was admitted on 9/12/2022 following an OOHCA.     In brief review, pt was found by a co-worker to be hanging from a forklift unresponsive. Patient was lowered to the ground, CPR started, 911 was called, AED was placed and patient received 1 shock, along with 2 mg of epinephrine and 300 mg of Amiodarone with ROSC. EKG showed ST elevation in the later leads. Patient was brought to  [facility] and taken directly to [facility] (formerly [facility]) where he had a coronary angiogram revealing 100% occluded mLAD s/p thrombectomy and JAMES, 80% LCx lesion s/p JAMES, 100% occluded OM2 s/p JAMES. A IABP, cooling cath and arterial lines placed and pateint was admitted to the ICU for further management.    Subjective and Interval: NAEO. Reached target temp 2150. Pressors weaned off 0200 this am.    Assessment and plan by system:   Today's changes:  Rewarm 2115  Echo prelim with potential mural thrombus  Start Heparin for possible thrombus  Repeat EKG this am (pending)  Amio if VT becomes more frequent/ recurs  Warm to 34 if ectopy recurs  Wean sedation  Stop Vanc    # Addendum: Confirmed mural thrombus-->HIIH initiated. Updated brother.      Neurology   # Concern for anoxic brain injury    Intubated, sedated. Cooled to 33 degrees. due to be rewarmed this evening 2115  CT head without intracranial pathology    Current sedation:  Versed @ 6 mg/hr  Fentanyl @ 100 mcg/hour    Plan:  -- Continue versed, fentanyl for sedation with a RASS goal -4 until warm  -- Cis as needed to maintain paralysis if shivering  -- continue vEEG      Cardiovascular  # VF Cardiac arrest 2/2 STEMI  # CAD with STEMI s/p PCI to mLAD, LCx, OM2  # IABP in situ for shock and coronary perfusion  # Probable Cardiomyopathy- await echo  # non sustained VT after revascularization  #Possible mural thrombus  As above, reportedly brief arrest with ROSC after 1 shock and ACLS meds. Coronary angiogram  revealing 100% occluded mLAD s/p thrombectomy and JAMES, 80% LCx lesion s/p JAMES, 100% occluded OM2 s/p JAMES.   - IABP in situ 1:1  - Weaned off pressors 0200 .   - Remains on Heparin  - Lactic WNL 1.7  - Trop not yet peaked 1636    TTE: pending  EKG: pending    Current Pressors/inotropes/antiarrythmic:   None  Plan:  -- Continue ASA 81mg and ticagrelor 90mg BID   -- Hold lipitor for now given shock liver  -- Hold ACE/ARB for now given likely reduced renal fxn after arrest  -- Holding beta blocker given shock  -- Lipid panel   -- Telemetry monitoring  -- Troponin downtrending     Pulmonary  # Acute hypoxemic respiratory failure requiring intubation  # Probable aspiration pneumonia  # Rib fractures  # pl effusions  Vent Settings: CMV/12/420/5/40%  AB.31/42/132/21  CXR: ETT 3 cm above lexy, IABP has been repositioned and is now 3 cm above lexy. Opacities but improved from prior  CAP CT with rib fractures    Plan:  -- Wean vent as able  -- Daily CXR  -- Serial ABGs   -- Consider scheduled duonebs if signs of lung dz, currently PRN    -- Peridex        Gastrointestinal, Nutrition  # Shock liver 2/2 cardiac arrest  -130, AST 56-58, Tbili 0.4    Plan:  -- Monitor LFTs  -- NPO while cooled - Nutrition consult once warmed   -- Bowel regimen - on hold for now due to hypothermia  -- GI Prophylaxis:  Protonix 40 mg daily     Renal, Electrolytes  # Probable Acute Renal Injury  # Hypoalbuminemia   # Lactic acidosis  -- Cr 0.86 BUN 13, lactic WNL, bicarb 20  I/O:s  UOP  385 yest cc  yesterday, 251 ml since midnight    Plan:  -- Monitor urine output  -- Avoid nephrotoxins    Infectious Disease  # Aspiration Pneumonia- in the setting of arrest.  CXR/CAP as above.   # Leukocytosis  WBC 25.2 on admit, now downtrending 17. No fever while cooled.   -- Monitor for signs of infection  -- Daily blood cultures while on ECMO   Cultures thus far:   - No growth to date  Antibiotics               - Vancomycin  - present                - Zosyn 9/13- present      Hematology  # Anemia of critical illness  # Possible Mural thrombus?  Hgb stable 12 (12) Plt  (231). No e/o bleeding.  -- ASA/ticagrelor for JAMES    DVT PPX: Start Heparin as above  - Start Heparin gtt for mural thrombus     Endocrinology  # DM II  -- insulin gtt as needed  -- Hgb a1c 7.5    Integumentary:  - No skin issues    Lines/Tubes/Drains:  IABP  CVC  RRA line  OG  ETT  Owens      ICU  Feeding: NPO  Analgesia: Fent   Sedation: Versed   Thrombopx: Start Heparin   Head of bed: Reverse trend   Ulcers: PPI  Glucose: Insulin gtt      Family will be updated by me    Patient seen and discussed with staff physician.    Time Spent on this Encounter   I spent 75 minutes on the unit/floor managing the care of Brian D Brunner. Over 50% of my time was spent on the following:   - Counseling the patient and/or family regarding: diagnostic results, prognosis, risks and benefits of treatment options, recommended follow-up and medical compliance  - Coordination of care with the: consultant(s), care coordinator/ and nurse  Specifically POC, Medications, prognosis, aftercare    VLADIMIR Napier CNP, APRN, CNP  [facility]  Interventional Cardiology-CSI Service  Pager [phone]     Objective:  Most recent vital signs:  /67 (BP Location: Left arm)   Pulse 55   Temp (!) 92.1  F (33.4  C)   Resp 12   Wt 98.5 kg (217 lb 2.5 oz)   SpO2 100%   Temp:  [90.3  F (32.4  C)-93.6  F (34.2  C)] 92.1  F (33.4  C)  Pulse:  [] 55  Resp:  [12-41] 12  BP: (102-141)/() 102/67  MAP:  [57 mmHg-114 mmHg] 67 mmHg  Arterial Line BP: ()/(28-56) 124/28  FiO2 (%):  [40 %-80 %] 40 %  SpO2:  [99 %-100 %] 100 %  Wt Readings from Last 2 Encounters:   09/12/22 98.5 kg (217 lb 2.5 oz)       Intake/Output Summary (Last 24 hours) at 9/13/2022 0745  Last data filed at 9/13/2022 0700  Gross per 24 hour   Intake 1049.55 ml    Output 645 ml   Net 404.55 ml     Physical exam:  General: In bed, in NAD  HEENT: PERRL, no scleral icterus or injection  CARDIAC: RRR, no m/r/g appreciated. Peripheral pulses dopplered  RESP: Mechanical ventilation; CTAB, no wheezes, rhonchi or crackles appreciated.  GI: soft, BS hypoactive  : Owens  EXTREMITIES: NO LE edema, pulses 2+. Femoral access site w/o bleeding, dressing c/d/i.   SKIN: No acute lesions appreciated  NEURO: Intubated and sedated    Labs (Past three days):  CBC  Recent Labs   Lab 09/13/22  0329 09/12/22 2020 09/12/22 1847 09/12/22 1807 09/12/22 1729 09/12/22  1716   WBC 17.4* 25.2*  --   --   --   --    RBC 4.50 4.59  --   --   --   --    HGB 12.4* 12.8* 13.5 13.6   < > 12.9*   HCT 38.1* 38.8*  --   --   --  38*   MCV 85 85  --   --   --   --    MCH 27.6 27.9  --   --   --   --    MCHC 32.5 33.0  --   --   --   --    RDW 13.2 13.1  --   --   --   --     231  --   --   --   --     < > = values in this interval not displayed.     BMP  Recent Labs   Lab 09/13/22  0659 09/13/22  0613 09/13/22  0500 09/13/22  0406 09/13/22 0329 09/12/22 2029 09/12/22 2020 09/12/22 1847 09/12/22 1807 09/12/22  1729 09/12/22  1716   0000   NA  --   --   --   --  136  --  134* 133 137   < > 139  --    POTASSIUM  --   --   --   --  3.5  --  4.6 3.9 3.8   < > 2.7*  --    CHLORIDE  --   --   --   --  105  --  101  --   --   --   --   --    CO2  --   --   --   --  20*  --  22  --   --   --   --   --    ANIONGAP  --   --   --   --  11  --  11  --   --   --   --   --    * 162* 184* 202* 232*   < > 294* 288* 305*   < > 308*   < >   BUN  --   --   --   --  13.3  --  12.4  --   --   --   --   --    CR  --   --   --   --  0.86  --  0.94  --   --   --   --   --    GFRESTIMATED  --   --   --   --  67  --  63  --   --   --   --   --    JACKIE  --   --   --   --  8.4  --  8.2  --   --   --   --   --    MAG  --   --   --   --  1.9  --  1.7  --   --   --  1.9  --    PHOS  --   --   --   --  2.6  --  2.2*  --    --   --   --   --     < > = values in this interval not displayed.     Troponins:     INR  Recent Labs   Lab 09/13/22  0329 09/12/22  1716   INR 1.25* 1.23*     Liver panel  Recent Labs   Lab 09/13/22 0329 09/12/22 2020   PROTTOTAL 6.3* 6.5   ALBUMIN 3.1* 3.2*   BILITOTAL 0.4 0.6   ALKPHOS 95 109   * 113*   ALT 58* 57*       Imaging/procedure results:  XR Chest Port 1 View  RESIDENT PRELIMINARY INTERPRETATION  Impression:   1. Repositioned IABP marker at the level of the distal aortic arch.  2. Cardiomegaly and persistent perihilar opacities.  XR Abdomen Port 1 View  Narrative: XR ABDOMEN PORT 1 VIEW  9/12/2022 9:34 PM      HISTORY: OG placement    COMPARISON: 9/12/2022    FINDINGS:   Single AP view of the central abdomen. Enteric tube tip and side-port  overlying the stomach. Inferior IABP marker at the approximate L3  level Nonobstructive bowel gas pattern. Stable right groin central  line. No pneumatosis.   Impression: IMPRESSION:   1. Enteric tube tip and side-port overlying the stomach.  2. Low-lying inferior IABP marker, as previously communicated with the  CV ICU team at the time of preceding chest radiograph report.  XR Chest Port 1 View  Narrative: XR CHEST PORT 1 VIEW  9/12/2022 9:34 PM      HISTORY: Check endotracheal tube placement. DO NOT log-roll patient.   Place film under patient using patient safety handling process.    COMPARISON: Same-day CT chest abdomen pelvis    FINDINGS:   Single AP view of the chest. Endotracheal tube tip over the  midthoracic trachea approximately 3.6 mm above the lexy. Inferior  central line tip over the T10 level of the IVC. Enteric tube courses  beyond the field-of-view. IABP marker approximately 3.8 cm below the  level of the lexy. Mild cardiomegaly. No pneumothorax. Small  layering pleural effusions. Diffuse interstitial and airspace  opacities.  Impression: IMPRESSION:   1. Endotracheal tube tip over the midthoracic trachea approximately  3.6 mm above the  lexy.   2. IABP marker approximately 3.8 cm below the level of the lexy.  Recommend repositioning.  3. Diffuse opacities consistent with pulmonary edema with or without  superimposed aspiration, as seen on CT.  4. Small layering pleural effusions.    [Urgent Result: Malpositioned IABP]    Finding was identified on 9/12/2022 9:58 PM.     CV-ICU was contacted by Dr. Richards at 9/12/2022 10:00 PM and  verbalized understanding of the urgent finding.   CT Chest Abdomen Pelvis w/o Contrast  Narrative: EXAMINATION: CT CHEST ABDOMEN PELVIS W/O CONTRAST, 9/12/2022 8:29 PM    TECHNIQUE:  Helical CT images from the lung apices through the  symphysis pubis were obtained without contrast.  Coronal and sagittal  reformatted images were generated at a workstation for further  assessment.    COMPARISON: Same day cardiac catheterization    HISTORY: post cardiac arrest    FINDINGS:  Lines and tubes: Endotracheal tip in the mid thoracic trachea. Enteric  tube tip near the pylorus. Right groin superior IABP marker  approximately 1.5 cm below the level the lexy in the descending  aorta. Right groin central line tip in the intrahepatic IVC. Owens  catheter within the bladder.    Thyroid: No suspicious nodules.  Heart and mediastinum: Cardiomegaly. No significant pericardial  effusion. Coronary artery calcifications and stenting.. Normal  esophagus.  Thoracic vasculature: Normal caliber thoracic aorta. Normal caliber  pulmonary artery.  Lymph nodes: Prominent mediastinal lymph nodes. No suspicious  lymphadenopathy.  Lungs: No pneumothorax. Small left greater than right pleural  effusions. Associated bilateral dependent atelectasis. Diffuse smooth  interlobular septal thickening. Upper lobe predominant bronchocentric  groundglass and mildly consolidative opacities.    Liver: No suspicious lesion.  Gallbladder: No cholelithiasis or evidence of acute cholecystitis. No  intra- or extra-hepatic biliary ductal dilatation.  Spleen:  Unremarkable.  Pancreas: Unremarkable.  Adrenal glands: No discrete nodules.  Kidneys: No hydronephrosis. No nephrolithiasis. No suspicious mass.  Right parapelvic cyst. Subcentimeter hypodensity in the left lower  pole, too small to characterize.  Bladder / Pelvic organs: Bladder is filled with excreted contrast.  Prostatomegaly.  Bowel: No evidence of obstruction. The appendix is unremarkable.  Colonic diverticulosis without evidence of acute diverticulitis.  Lymph nodes: Scattered prominent lymph nodes, likely reactive. No  suspicious lymphadenopathy.  Peritoneum / Retroperitoneum: No pneumoperitoneum. No organized fluid  collections. Small fat-containing inguinal hernias. Mild central  mesenteric haziness/stranding, likely related to resuscitation/third  spacing.  Abdominal vasculature: Normal caliber abdominal aorta.    Bones and soft tissues: Degenerative changes of the visualized spine.  Scattered sclerotic foci in the bony pelvis presumed bone islands. No  suspicious bony lesions. Acute nondisplaced to minimally displaced  fractures of the right anterior 2nd-7th and left 3rd-6th ribs.  Impression: IMPRESSION:   1. Lines and tubes: Endotracheal tip in the mid thoracic trachea.  Enteric tube tip near the pylorus. Right groin superior IABP marker  approximately 1.5 cm below the level the lexy in the descending  aorta. Right groin central line tip in the intrahepatic IVC. Owens  catheter within the bladder.  2. Acute resuscitative fractures of the right anterior 2nd-7th and  left 3rd-6th ribs. No pneumothorax.  3. Cardiomegaly with small left greater than right pleural effusions,  pulmonary edema, and bronchocentric opacities also suggestive of  pulmonary edema although aspiration is a consideration.  CT Head w/o Contrast  Narrative: CT HEAD W/O CONTRAST 9/12/2022 8:29 PM    Provided History: cardiac arrest    Comparison: None.    Technique: Using multidetector thin collimation helical acquisition  technique,  axial, coronal and sagittal CT images from the skull base  to the vertex were obtained without intravenous contrast.     Findings:    Intracranial contrast from earlier same day cardiac catheterization is  largely subtracted on the virtual noncontrast images with residual  hyperdensity within the dural venous sinuses.    No definite intracranial hemorrhage. No mass effect. No midline shift.  No extra-axial fluid collection. The gray to white matter  differentiation of the cerebral hemispheres is preserved. Ventricles  are proportionate to the sulci. No sulcal effacement. The basal  cisterns are patent.    Hypoplastic frontal sinuses. Mucosal thickening in the ethmoid air  cells. Debris in the nasopharynx. The mastoid air cells are clear.  Orbits appear unremarkable. No acute fracture.   Cisterna magna.  Impression: Impression:  No acute intracranial pathology.    I have personally reviewed the examination and initial interpretation  and I agree with the findings.    LIDA MORE MD         SYSTEM ID:  [id number]

## 2022-09-13 NOTE — PROGRESS NOTES
"CLINICAL NUTRITION SERVICES - ASSESSMENT NOTE     Nutrition Prescription    RECOMMENDATIONS FOR MDs/PROVIDERS TO ORDER:  Once pt re-warmed and ready to initiated EN, please consult nutrition: \"Registered Dietitian to Assess and Order TF per Medical Nutrition Therapy Protocol\"    Malnutrition Status:    Patient does not meet two of the established criteria necessary for diagnosing malnutrition    Recommendations already ordered by Registered Dietitian (RD):  -100 mg thiamine for EF <30%    Future/Additional Recommendations:  Once pt re-warmed and EN within nutrition POC, recommend initiating via OGT:  TwoCal HN @ 45 mL/hr (1080 mL/day) + 1 pkt Prosource QID to provide 2320 kcals (28 kcals/kg/day), 135 g PRO (1.6 g/kg/day), 756 mL H2O, 237 g CHO and 5 g Fiber daily.  - Initiate @ 15 ml/hr and advance by 15 ml q8hr as tolerated  - Do not start or advance until lytes (Mg++,K+) WNL and phos>2.0  - Recommend 30 ml q4hr fluid flushes for tube patency. Additional fluids and/or adjustments per MD.    - Order multivitamin/mineral (15 ml/day via FT) to help ensure micronutrient needs being met with suspected hypermetabolic demands and potential interruptions to TF infusions.  - Elevated HOB with gastric feeds      REASON FOR ASSESSMENT  Brian D Brunner is a/an 61 year old male assessed by the dietitian for Nutrition Risk Monitoring    NUTRITION HISTORY  Unable to obtain nutrition hx at this time as pt intubated and sedated.     CURRENT NUTRITION ORDERS  Diet: NPO    LABS  Labs reviewed    MEDICATIONS  Medications reviewed    ANTHROPOMETRICS  Height: 5'10\" (estimated by RD)  Most Recent Weight: 98.5 kg (217 lb 2.5 oz)    IBW: 75.5 kg  BMI: Obesity Grade I BMI 30-34.9  Weight History: None on file     Dosing Weight: 83 kg (adjusted BW based on IBW and actual lowest BW of 98.5 kg)    ASSESSED NUTRITION NEEDS  Estimated Energy Needs: 2821-3515 kcals/day (25 - 30 kcals/kg)  Justification: Maintenance  Estimated Protein Needs: " 125-165 grams protein/day (1.5 - 2 grams of pro/kg)  Justification: s/p cardiac arrest  Estimated Fluid Needs: 2500 mL/day (1 mL/kcal)   Justification: Maintenance    PHYSICAL FINDINGS  See malnutrition section below.  No abnormal nutrition-related physical findings observed.     MALNUTRITION  % Intake: Unable to assess  % Weight Loss: Unable to assess  Subcutaneous Fat Loss: None observed  Muscle Loss: None observed  Fluid Accumulation/Edema: None noted  Malnutrition Diagnosis: Patient does not meet two of the established criteria necessary for diagnosing malnutrition    NUTRITION DIAGNOSIS  Inadequate oral intake related to intubation as evidenced by NPO status.       INTERVENTIONS  Implementation  Enteral Nutrition - Initiate when pt re-warmed and hemodynamically stable     Goals  Diet adv v nutrition support within 2-3 days.     Monitoring/Evaluation  Progress toward goals will be monitored and evaluated per protocol.    Blanca Delarosa, MS, RD, LD  4E (CVICU) RD pager: [phone]  Ascom: [phone]  Weekend/Holiday RD pager: [phone]

## 2022-09-13 NOTE — PLAN OF CARE
Goal Outcome Evaluation:    Plan of Care Reviewed With: other (see comments) (unable to discuss with pt at this time)     Overall Patient Progress: no change    Outcome Evaluation: recommend initiating EN when pt re-warmed

## 2022-09-13 NOTE — PROGRESS NOTES
[facility], Procedure Note          Intra-Aortic Balloon Pump Insertion:       Jessi Jackson  MRN# [id number]    Indication: Cardiogenic Shock           Procedure performed: IABP placement   Location: Heart Cath   Catheter size: 50 cc 8 FR   Inserted: 9/12 at 1920   Catheter placed: R fem   Complications:: None   Assist initiated: Yes   Percent augmentation: 100       Verification of position: fluroscopy   Comments: None      Recorded by Shellie Edwards RRT-NPS

## 2022-09-13 NOTE — PROGRESS NOTES
CLINICAL NUTRITION SERVICES - BRIEF NOTE    Received provider consult for heart healthy diet education. Pt not appropriate for nutrition education at this time.     RD will follow per LOS protocol or if re-consulted.     Blanca Delarosa MS, RD, LD  4E (CVICU) RD pager: [phone]  Ascom: [phone]  Weekend/Holiday RD pager: [phone]

## 2022-09-13 NOTE — PLAN OF CARE
Major Shift Events: Pt arrived to unit after cath lab/CT at 2040. Pt is not responsive to stimuli. Sedated. Fent and versed. SR. IABP 1:1. MAP goal >65, <100, pressors off. CMV settings. OG to SUPA Owens, output 20mL/hour, MD notified. Pt being cooled at 33C, started at 2040. Reached goal temp at 2115.  Plan: Monitor temp and vitals. Continue w/ cooling. Continue w/ POC.  For vital signs and complete assessments, please see documentation flowsheets.

## 2022-09-13 NOTE — PHARMACY-VANCOMYCIN DOSING SERVICE
"Pharmacy Vancomycin Initial Note  Date of Service 2022  Patient's  1899  89 year old, male    Indication: Aspiration Pneumonia    Current estimated CrCl = CrCl cannot be calculated (Unknown ideal weight.).    Creatinine for last 3 days  2022:  8:20 PM Creatinine 0.94 mg/dL    Recent Vancomycin Level(s) for last 3 days  No results found for requested labs within last 72 hours.      Vancomycin IV Administrations (past 72 hours)                   vancomycin (VANCOCIN) 2,000 mg in sodium chloride 0.9 % 500 mL intermittent infusion (mg) 2,000 mg New Bag 22 2223                Nephrotoxins and other renal medications (From now, onward)    Start     Dose/Rate Route Frequency Ordered Stop    22  vancomycin (VANCOCIN) 2,000 mg in sodium chloride 0.9 % 500 mL intermittent infusion         2,000 mg  over 120 Minutes Intravenous EVERY 24 HOURS 22  vancomycin (VANCOCIN) 2,000 mg in sodium chloride 0.9 % 500 mL intermittent infusion         2,000 mg  over 120 Minutes Intravenous ONCE 22  norepinephrine (LEVOPHED) 16 mg in  mL infusion MAX CONC CENTRAL LINE         0.01-0.6 mcg/kg/min × 98.5 kg (Dosing Weight)  0.9-55.4 mL/hr  Intravenous CONTINUOUS 22  piperacillin-tazobactam (ZOSYN) 3.375 g vial to attach to  mL bag        Note to Pharmacy: For [facility], [facility] and [facility]: For Zosyn-naive patients, use the \"Zosyn initial dose + extended infusion\" order panel.    3.375 g  over 30 Minutes Intravenous EVERY 6 HOURS 22  vasopressin (VASOSTRICT) 20 Units in sodium chloride 0.9 % 100 mL standard conc infusion         0.5-4 Units/hr  2.5-20 mL/hr  Intravenous CONTINUOUS 22            Contrast Orders - past 72 hours (72h ago, onward)    Start     Dose/Rate Route Frequency Stop    22 1933  iopamidol (ISOVUE-370) solution  Status:  Discontinued     "       ONCE PRN 09/12/22 2016          [employer] Prediction of Planned Initial Vancomycin Regimen  N/A, post arrest        Plan:  1. Start vancomycin  2000 mg (20 mg/kg) IV q24h.   2. Vancomycin monitoring method: Trough (Method 2 = manual dose calculation)  3. Vancomycin therapeutic monitoring goal: 15-20 mg/L  4. Pharmacy will check vancomycin levels as appropriate in 1-3 Days.    5. Serum creatinine levels will be ordered daily for the first week of therapy and at least twice weekly for subsequent weeks.      Graham Sorenson RPH

## 2022-09-13 NOTE — PROGRESS NOTES
Admitted/transferred from: Admitted from cath lab  Reason for admission/transfer: Cardiac arrest  2 RN skin assessment: completed by Sera RN  Result of skin assessment and interventions/actions: L elbow skin tear  Height, weight, drug calc weight: Done  Patient belongings (see Flowsheet)  MDRO education added to care planN/A  ?

## 2022-09-13 NOTE — PROGRESS NOTES
[facility]   Critical Care Cardiology - Progress Note    Brian D Brunner MRN: [id number]  Age: 61 year old, : 1960  Admission Date: 2022    Assessment and Plan:  Brian Brunner is a 61 year old male with unknown PMHx who was admitted on 2022 following an OOHCA.      In brief review, pt was found by a co-worker to be hanging from a forklift unresponsive. Patient was lowered to the ground, CPR started, 911 was called, AED was placed and patient received 1 shock, along with 2 mg of epinephrine and 300 mg of Amiodarone with ROSC. EKG showed ST elevation in the later leads. Patient was brought to  [facility] and taken directly to [facility] where he had a coronary angiogram revealing 100% occluded mLAD s/p thrombectomy and JAMES, 80% LCx lesion s/p JAMES, 100% occluded OM2 s/p JAMES. An IABP, cooling cath and arterial lines placed and patient was admitted to the ICU for further management.    Interval History:  No acute events overnight.  Slight hypoxia requiring increase of FiO2 from 40% to 50%. Otherwise, no acute events.  intermittent rhythmic twitching, evaluated by Neurocritical care.    Today's Plan:  - add on lipid panel  - case request; cath lab today for complete revascularization given recurrent VT  - start suppository today  - goal net even to 1L negative today, use of lasix to do so  - remove left femoral sheath after cath    Neurology  # Concern for anoxic brain injury    # Rhythmic Twitching  Intubated, sedated. Cooled to 33 degrees. Rewarmed -. CT head without intracranial pathology. Rhythmic twitching  - low dose propofol okay. Sedation holiday s/p cath  - wean fentanyl as able  - continue vEEG   - palliative care consult  - Neurocritical care consult; no overt seizure on EEG associated with twitching.      Cardiovascular  # non sustained VT after revascularization now on VA ECMO  # VF Cardiac arrest 2/2 STEMI  # CAD with STEMI s/p PCI to mLAD, LCx, OM2  # IABP in  situ for shock and coronary perfusion  # Cardiomyopathy  # mural thrombus  As above, reportedly brief arrest with ROSC after 1 shock and ACLS meds. Coronary angiogram revealing 100% occluded mLAD s/p thrombectomy and JAMES, 80% LCx lesion s/p JAMES, 100% occluded OM2 s/p JAMES. Weaned off pressors 0200 . VT storm early , now cannulated on VA ECMO. initially on amiodarone and lidocaine; lidocaine discontinued 9/15. Hbg A1c 7.5%  - add on lipid panel  - case request; cath lab today for complete revascularization given recurrent VT  - IABP in situ 1:1  ECMO Cares   - Flow 3.7-3.8LPM   - Sweep 0.5LPM   - FiO2 50%   - -194. On heparin ggt  Pressors/inotropes/antiarrythmic:    - amiodarone ggt; continue at 1mg/kg/min given recent VT storm  GDMT:   - Continue ASA 81mg and ticagrelor 90mg BID    - Hold Lipitor for now given shock liver   - Hold ACE/ARB for now given likely reduced renal fxn after arrest   - Holding beta blocker given shock    Pulmonary  # Acute hypoxemic respiratory failure requiring intubation  # aspiration pneumonia  # Rib fractures  # pl effusions  Vent Settings:  CMV 12/450/7/40%  AB.54/38/72/32  - Wean vent as able  - Daily CXR  - Serial ABGs   - Consider scheduled duonebs if signs of lung dz, currently PRN    - Peridex BID    Gastrointestinal, Nutrition  # Shock liver 2/2 cardiac arrest  # No BM Since Admission  LFTs down trending.  - Monitor LFTs  - on senna, miralax.    - start suppository today    Renal, Electrolytes  # Probable Acute Renal Injury  # Hypoalbuminemia   # Metabolic Alkalosis  # Lactic acidosis, resolved  Cr 1.16, stable.  I/O's: UOP 3716cc yesterday (received 60mg IV lasix x2). Net negative 553cc yesterday  - Monitor urine output  - goal net even to 1L negative today  - Avoid nephrotoxins    Infectious Disease  # Aspiration Pneumonia  # Leukocytosis  WBC 22.8 today, beginning to downtrend.  Tmax 98.8   - Monitor for signs of infection  Cultures thus far:                -  No growth to date  Antibiotics               - Vancomycin 9/13                - Zosyn 9/13- present     Hematology  # Anemia of critical illness  # Mural thrombus  Hgb 8.3. Platelet 165  - ASA/ticagrelor for JAMES  - Heparin gtt for mural thrombus, ECMO    Endocrinology  # DM II  - insulin gtt as needed    MSK/Skin  No active issues     Pertinent Lines  L venous sheath (thermogard) - remove today  Right radial arterial line  L femoral artery IABP  17F RFA  25F RFV  8F RSFA (distal reperfusion catheter)  NG  Owens  ETT    ICU Cares:  CXR: ETT, IABP well positioned. Parenchyma grossly normal  Fluids/Feeds: TF held given gastric placement for procedure today.  Fluids not indicated  DVT Prophylaxis: heparin ggt  GI Prophylaxis: protonix  Bowel Regimen: senna, miralax.  Add suppository today  Anticipated Floor Transfer: N/A    I spent 65 minute face-to-face or coordinating care of Brian D Brunner. Over 50% of our time on the unit was spent counseling the patient and/or coordinating care regarding cardiac arrest.    Family Update: updated by me today    Patient seen and discussed with Dr. Emily Shah.  Assessment and plan as above.    Snow Ortiz PA-C  Critical Care Cardiology  Pager: [phone]      Objective Findings:  Temp:  [90.3  F (32.4  C)-93.6  F (34.2  C)] 91.9  F (33.3  C)  Pulse:  [] 58  Resp:  [12-41] 12  BP: (102-141)/() 102/67  MAP:  [57 mmHg-114 mmHg] 79 mmHg  Arterial Line BP: ()/(28-56) 131/37  FiO2 (%):  [40 %-80 %] 40 %  SpO2:  [99 %-100 %] 100 %    I/O:  Intake/Output Summary (Last 24 hours) at 9/16/2022 0920  Last data filed at 9/16/2022 0800  Gross per 24 hour   Intake 3241.06 ml   Output 3838 ml   Net -596.94 ml       Physical Exam:  GEN: intubated. Appears comfortable  HEENT: no craniial trauma  Pulm: seemingly CTAB  Cardiac: regular rate/rhythm. No murmur, rub, gallop  GI: soft, non distended  Neuro: pupils reactive. Does not follow commands  Integument: no skin  breakdown  Vascular: LE warm, well perfused      Medications:    artificial tears   Both Eyes Q8H     aspirin  81 mg Per Feeding Tube Daily     pantoprazole  40 mg Intravenous Daily     piperacillin-tazobactam  3.375 g Intravenous Q6H     ticagrelor  90 mg Oral or Feeding Tube BID       cisatracurium       dextrose       EPINEPHrine       fentaNYL 100 mcg/hr (09/13/22 0800)     heparin 1,750 Units/hr (09/13/22 1018)     insulin regular 3 Units/hr (09/13/22 1017)     midazolam 6 mg/hr (09/13/22 0800)     niCARdipine Stopped (09/12/22 2152)     norepinephrine Stopped (09/13/22 0209)     Percutaneous Coronary Intervention orders placed (this is information for BPA alerting)       phenylephrine       propofol Stopped (09/13/22 0209)     BETA BLOCKER NOT PRESCRIBED       STATIN NOT PRESCRIBED       vasopressin           Labs:   [facility]  Recent Labs   Lab 09/13/22  1015 09/13/22  1011 09/13/22  0904 09/13/22  0805 09/13/22  0406 09/13/22  0329 09/12/22  2029 09/12/22  2020 09/12/22  1847 09/12/22  1807 09/12/22  1729 09/12/22  1716   0000   NA  --   --   --   --   --  136  --  134* 133 137   < > 139  --    POTASSIUM  --   --   --   --   --  3.5  --  4.6 3.9 3.8   < > 2.7*  --    CHLORIDE  --   --   --   --   --  105  --  101  --   --   --   --   --    CO2  --   --   --   --   --  20*  --  22  --   --   --   --   --    ANIONGAP  --   --   --   --   --  11  --  11  --   --   --   --   --    * 160* 148* 146*   < > 232*   < > 294* 288* 305*   < > 308*   < >   BUN  --   --   --   --   --  13.3  --  12.4  --   --   --   --   --    CR  --   --   --   --   --  0.86  --  0.94  --   --   --   --   --    GFRESTIMATED  --   --   --   --   --  67  --  63  --   --   --   --   --    JACKIE  --   --   --   --   --  8.4  --  8.2  --   --   --   --   --    MAG  --   --   --   --   --  1.9  --  1.7  --   --   --  1.9  --    PHOS  --   --   --   --   --  2.6  --  2.2*  --   --   --   --   --    PROTTOTAL  --   --   --   --   --  6.3*  --   6.5  --   --   --   --   --    ALBUMIN  --   --   --   --   --  3.1*  --  3.2*  --   --   --   --   --    BILITOTAL  --   --   --   --   --  0.4  --  0.6  --   --   --   --   --    ALKPHOS  --   --   --   --   --  95  --  109  --   --   --   --   --    AST  --   --   --   --   --  130*  --  113*  --   --   --   --   --    ALT  --   --   --   --   --  58*  --  57*  --   --   --   --   --     < > = values in this interval not displayed.     CBC  Recent Labs   Lab 09/13/22  0329 09/12/22 2020 09/12/22 1847 09/12/22 1807 09/12/22 1729 09/12/22  1716   WBC 17.4* 25.2*  --   --   --   --    RBC 4.50 4.59  --   --   --   --    HGB 12.4* 12.8* 13.5 13.6   < > 12.9*   HCT 38.1* 38.8*  --   --   --  38*   MCV 85 85  --   --   --   --    MCH 27.6 27.9  --   --   --   --    MCHC 32.5 33.0  --   --   --   --    RDW 13.2 13.1  --   --   --   --     231  --   --   --   --     < > = values in this interval not displayed.     Arterial Blood Gas  Recent Labs   Lab 09/13/22  1015 09/13/22 0328 09/12/22 2021 09/12/22 2020 09/12/22  1847   PH 7.31* 7.31* 7.36  --  7.30*   PCO2 46* 42 39  --  42   PO2 121* 132* 174*  --  109*   HCO3 23 21 22  --  21   O2PER 40 50 80 80 60.0         Pertinent Imaging Studies:  Coronary angiogram:     Ost RCA lesion is 50% stenosed.    Dist RCA lesion is 60% stenosed.    RPDA lesion is 60% stenosed.    3rd Diag lesion is 99% stenosed.    1st Sept lesion is 50% stenosed.    1st Mrg lesion is 100% stenosed.    2nd Diag lesion is 40% stenosed.    2nd Mrg lesion is 40% stenosed.    1st LPL lesion is 30% stenosed.  1. Patent stents in LAD and LCx/OM, no new lesions noted on diagnostic angiogram as compared to post PCI angiogram on 9/12/22.  2. Successful ECMO cannulation at left femoral vein and artery with distal perfusion sheath.    Echo:   Biplane LVEF is 16%.  On VA ECMO at 3.8LPM.  LV apical thrombus noted.  Right ventricular function, chamber size, wall motion, and thickness  are  normal.      Snow Ortiz PA-C  Critical Care Cardiology  Pager: [phone]

## 2022-09-13 NOTE — PROGRESS NOTES
VEEG monitoring preliminary results:    VEEG has been running for over one hour.  EEG showed severe generalized slowing with attenuated background activities. Recording had significant muscle artifacts.  No epileptiform activities, no clinical or electrographic seizures were observed.  Findings are consistent with profound encephalopathy.       Carlos Colbert MD  Neurology

## 2022-09-14 NOTE — PROGRESS NOTES
ECLS Cannulation Note:    Date on: 9/14/2022  Time on: 0354  Cannulating Physician: Jj    Arterial Cannula: 17 Fr. In the left femoral artery      Venous Cannula: 25 Fr. In the left femoral vein      ECMO components include  Circuit Lot Number:  [id number]  Console Serial Number:  [id number]  Oxygenator Lot Number:  [id number]    Cannulation was performed in heart cath, placement was verified by fluorscopy, cannula position is adequate.    Patient's blood type is B+.    Shellie Edwards RRT-NPS  ECMO Specialist  9/14/2022 5:04 AM

## 2022-09-14 NOTE — CONSULTS
[facility]  Palliative Care Social Work Note:    Patient Info:  Brian D Brunner is a 61 year old male with unknown PMHx who was admitted on 9/12/2022 following an OOHCA.      In brief review, pt was found by a co-worker to be hanging from a forklift unresponsive. Patient was lowered to the ground, CPR started, 911 was called, AED was placed and patient received 1 shock, along with 2 mg of epinephrine and 300 mg of Amiodarone with ROSC. EKG showed ST elevation in the later leads. Patient was brought to  [facility] and taken directly to [facility] where he had a coronary angiogram revealing 100% occluded mLAD s/p thrombectomy and JAMES, 80% LCx lesion s/p JAMES, 100% occluded OM2 s/p JAMES. A IABP, cooling cath and arterial lines placed and pateint was admitted to the ICU for further management. Unfortunately, patient went into VT storm, was shocked several times, Amio started, ROSC intermittently obtained but ultimately patient was so unstable, he was cannulated onto VA ECMO on 9/13.      As above, patient went into refractory VT overnight requiring ~ 15 shocks, amio bolus and ultimately cannulated onto VA ECMO. Pressors weaned off shortly after patient was cannulated. Nursing concerns for jerking like movements and bolus of Versed and Propofol were given. Yesterday, mural thrombus noted on echo for which Heparin started, stopped Vanc. Did not scan post VA ECMO cannulation/resucitation.    Brief summary of visit: Palliative Care JORJE and PCSW made introductory phone call to pt's brother and primary contact, Aaron this afternoon. Reviewed role of palliative care team and provided Aaron opportunity to ask questions, express concerns/worries and share some of Lisandro's story with team.     Aaron identifies good support from his brothers (five brothers total) and co-workers/friends. Aaron verbalizes hope for Lisandro's recovery but also shared he is less hopeful today than he was yesterday following last night's  cardiac event.    Aaron shared that Lisandro's wife passed unexpectedly in her sleep approximately 4 years ago. Roughly six months later, Lisandro's step dtr took her own life. Most recently, the brothers have lost their father following prolonged health struggles. Aaron noted that through all of this, he believes Lisandro has struggled with depression. Aaron also shared that Lisandro has not ever spoken about health care wishes for himself.    Lisandro lives alone and per Aaron, in his spare time enjoys the [employer] and spending time on social media.      Date of Admission: 9/12/2022    Reason for consult: Patient and family support    Sources of information: Family member brother Aaron    Recommendations & Plan:  Palliative care team will continue to follow; provide emotional and decisional support as needed/appropriate; participate in care conferences.     These recommendations have been discussed with palliative care team.    Symptoms & Concerns Addressed Today:  Emotional support needs assessed and discussed with pt's brother Aaron during call today    Strengths Identified:    Family support    Relationships & Support:  Aspects of relationships and support assessed today:    Identified family members: see above    Professional supports: NA    Family coping: continue to assess    Bereavement Risk concerns: low    Coping, Mental Health & Adjustment to Illness:   Other not assessed at this time    Goals, Decision Making & Advance Care Planning:   Prognosis, Goals, and/or Advance Care Planning were assessed today: No  If yes, brief summary of discussion: brother Aaron hopeful for recovery though verbalizes concerns of what is possible  Preferred language: English  Patient's decision making preferences: not assessed  I have concerns about the patient/family's health literacy today: No  Patient has a completed Health Care Directive: No.   Code status per chart review: Full Code    Clinical Social Work Interventions:   Introduction of  Palliative clinical social work interventions  Family communication facilitated  Life review facilitation      AARTI Shaw  Palliative Care   Pager [phone]    [facility] Inpatient Team Consult pager [phone] (M-F 8-4:30)  After-hours Answering Service [phone]

## 2022-09-14 NOTE — CONSULTS
Physician Attestation   Lisandro was seen and evaluated by me on 09/14/22.  He was in critical condition as the result of post cardiac arrest encephalopathy at risk for seizure and neurological deterioration  His condition is now Critical.    I have reviewed changes in critical data from the last 24 hours, including medications, laboratory results, vital signs, and radiograph results.      The acute issues managed by me today include post cardiac encephalopathy with concern for seizure. vEEG reviewed and showed initially generalized slow with intermittent gpeds with polyspike. Currently vEEG with burst suppression pattern. CTH reviewed and no acute findings  -cont post arrest care  -cont vEEG  -no AEDs at this time.     I formulated and discussed my care plan with the patient s Advanced Practice Provider     I discussed the course and plan with the Bedside Nurse, Care Coordinator/, and Primary team and answered all questions to the best of my ability.     Total Critical Care time spent by me, excluding procedures, was 38 minutes    Rajesh Dong MD    Neurocritical Care Consultation    Reason for critical care admission: Cardiac Arrest  Admitting Team: CSI  Date of Service:  09/14/2022  Date of Admission:  9/12/2022  Hospital Day: 3    Assessment/Plan  Brian D Brunner is a 61 year old male with a unknown past medical history of admitted on 9/12/2022 for Cardiac arrest. Yesterday pt was being warmed, rearrested and was cannulated for VA EECMO. NCC consulted for concerns for seizures. Pt was making shoulder shrugs movements per RN.     Neuro  #Concern for seizures   -EEG-Based on EEG burst suppression pattern and level of sedation no seizures are seen. Recommend weaning sedation as able and we will watch for EEG pattern changes.     Analgesics & sedation  Current sedation:  -Fentanyl-200  -Propofol-20  -Versed-8    Exam findings   -Cough: No  -Gag: No  -Pupils: reactive, pupillometry  q4    Neuroimaging  -Day 1 CTH shows: No acute pathology  -Repeat CTH  No acute pathology    Neurophysiology  -continue vEEG with VA ECMO  -vEEG shows: No seizures, Burst suppression pattern. Wean sedation as able.      Recommendations  -Avoid hypotonic solutions as they may worsen cerebral edema   -Avoid nitroprusside or nitroglycerin as they may also worsen cerbral edema  -Advise slow correction of hypernatremia if needed  -When safe to do so, please limit use of CNS acting medications such as benzodiazepines, opiates, anticholinergics, which will permit a more accurate neurological assessment  -We will continue to follow, please call [phone] with any questions        TIME SPENT ON THIS ENCOUNTER   I spent 36 minutes of critical care time on the unit/floor managing the care of Brian D Brunner. Upon evaluation, this patient had a high probability of imminent or life-threatening deterioration due to Cardiac arrest, on ECMO, Concern for seizures, which required my direct attention, intervention, and personal management. Greater than 50% of my time was spent at the bedside counseling the patient and/or coordinating care regarding services listed in this note.    The patient was seen and discussed with the [facility] attending, Dr. Dong.    Junior Muhammad, [facility], *[phone]    History of Present Illness:  Brian D Brunner is a 61 year old male with a unknown past medical history of admitted on 9/12/2022 for Cardiac arrest. Pt was found hanging from forklift unresponsive by coworker. AED placed on site and it delivered 1-shock. EMS arrived and administer 2mg of epi and 300 amio. ROSC obtained pt coooled.     Yesterday pt was being warmed and rearrested and was cannulated for VA EECMO  Consulted for concerns for seizures. Pt was making shoulder shrugs movements per RN.     Unable to obtain hx from pt 2/2 level of sedation and crittically ill. No family present.       PAST MEDICAL HISTORY: Unable to assess because of pt being  sedated and critically ill. No family present.     PAST SURGICAL HISTORY:   Past Surgical History:   Procedure Laterality Date     CV ARTERIAL LINE PLACEMENT N/A 9/12/2022    Procedure: Arterial Line Placement;  Surgeon: Lul Porter MD;  Location: [facility] CARDIAC CATH LAB     CV CENTRAL VENOUS CATHETER PLACEMENT N/A 9/12/2022    Procedure: Central Venous Catheter Placement;  Surgeon: Lul Porter MD;  Location: [facility] CARDIAC CATH LAB     CV CORONARY ANGIOGRAM N/A 9/12/2022    Procedure: Coronary Angiogram;  Surgeon: Lul Porter MD;  Location: [facility] CARDIAC CATH LAB     CV INTRA AORTIC BALLOON N/A 9/12/2022    Procedure: Intraprocedure Aortic Balloon Pump Insertion;  Surgeon: Llu Porter MD;  Location: [facility] CARDIAC CATH LAB     CV PCI ASPIRATION THROMECTOMY N/A 9/12/2022    Procedure: Percutaneous Coronary Intervention Aspiration Thrombectomy;  Surgeon: Lul Porter MD;  Location: [facility] CARDIAC CATH LAB     CV PCI STENT DRUG ELUTING N/A 9/12/2022    Procedure: Percutaneous Coronary Intervention Stent;  Surgeon: Lul Porter MD;  Location: [facility] CARDIAC CATH LAB       FAMILY HISTORY:   Unable to obtain due to: Unable to assess because of pt being sedated and critically ill. No family present.         SOCIAL HISTORY:   Unable to assess because of pt being sedated and critically ill. No family present.     ROS: The 10 point Review of Systems is negative other than noted in the HPI or here.     Current Medications:    artificial tears   Both Eyes Q8H     aspirin  81 mg Per Feeding Tube Daily     pantoprazole  40 mg Intravenous Daily     piperacillin-tazobactam  3.375 g Intravenous Q6H     polyethylene glycol  17 g Oral Daily     potassium chloride  20 mEq Intravenous Once     senna-docusate  2 tablet Oral At Bedtime     thiamine  100 mg Oral Daily     ticagrelor  90 mg Oral or Feeding Tube BID  "      PRN Medications:  calcium chloride, dextrose, glucose **OR** dextrose **OR** glucagon, fentaNYL, fentaNYL, heparin ANTICOAGULANT, HOLD MEDICATION, hydrALAZINE, lidocaine 4%, lidocaine 4%, lidocaine (buffered or not buffered), lidocaine (buffered or not buffered), magnesium sulfate, magnesium sulfate, metoprolol, midazolam, midazolam, nitroGLYcerin, ondansetron **OR** ondansetron, oxyCODONE **OR** oxyCODONE, Percutaneous Coronary Intervention orders placed (this is information for BPA alerting), BETA BLOCKER NOT PRESCRIBED, STATIN NOT PRESCRIBED, sodium chloride (PF), sodium chloride (PF), sodium phosphate, sodium phosphate, sodium phosphate, sodium phosphate    Infusions:    amiodarone 1 mg/min (09/14/22 1300)     cisatracurium       dextrose       fentaNYL 200 mcg/hr (09/14/22 1300)     heparin (PRESSURE BAG) 2 unit/mL in 0.9% NaCl 6 Units/hr (09/14/22 1300)     HEParin 600 Units/hr (09/14/22 1400)     insulin regular 2 Units/hr (09/14/22 1315)     lidocaine 1 mg/min (09/14/22 1300)     midazolam 8 mg/hr (09/14/22 1300)     norepinephrine Stopped (09/14/22 0353)     Percutaneous Coronary Intervention orders placed (this is information for BPA alerting)       propofol 20 mcg/kg/min (09/14/22 1300)     BETA BLOCKER NOT PRESCRIBED       STATIN NOT PRESCRIBED       vasopressin         Physical Examination:  Vitals: /61   Pulse 80   Temp 99  F (37.2  C)   Resp 12   Ht 1.702 m (5' 7\")   Wt 98.5 kg (217 lb 2.5 oz)   SpO2 100%   BMI 34.01 kg/m    General: Adult male patient, lying in bed, critically-ill  HEENT: Normocephalic, atraumatic, no icterus, oral cavity/oropharynx pink and moist, ETT  Cardiac: Tele, ECMO   Pulm: CTAB, unlabored, expansion symmetric, no retractions or use of accessory muscles, VENT  Abdomen: Soft, round, bowel sounds present  Extremities: cool, generalized edema, distal pulses +2, well perfused  Skin: No rash or lesion  Psych: sedated  Neuro:  Pt is deeply sedated. Pupils equal " and reactive on pupillometry. Pt does not open eyes or follow commands. -cough/gag. No response to noxious stim.     Labs:  Recent Labs   Lab 09/14/22  0935 09/14/22  0805 09/14/22  0459 09/14/22  0117    138 136 137   POTASSIUM 3.7 3.9 3.9 4.0   CHLORIDE 108* 107 105 106   CO2 20* 19* 19* 21*   BUN 18.5 18.3 18.3 17.4   CR 1.13 1.07 1.06 0.93   JACKIE 8.3* 8.4* 8.7* 8.6*   BILITOTAL 0.4 0.4 0.5 0.5   ALKPHOS 74 77 75 86   ALT 41 38 47 54*   AST 70* 73* 82* 100*       Recent Labs   Lab 09/14/22  0805 09/14/22  0459 09/14/22  0116 09/13/22  2244   WBC 18.5* 20.0* 23.4* 23.3*   HGB 10.6* 11.2* 12.5* 12.1*    198 215 207       Recent Labs   Lab 09/14/22  1357 09/14/22  1156 09/14/22  0935 09/14/22  0759   PH 7.43 7.41 7.47* 7.48*   PCO2 35 36 29* 26*   PO2 117* 133* 132* 124*   HCO3 24 23 21 19*       All cultures:  Recent Labs   Lab 09/13/22  1627 09/13/22  0628 09/12/22 2027 09/12/22 2026   CULTURE No growth, less than 1 day No growth after 12 hours 4+ Normal emily No growth after 1 day  No growth after 1 day       Imaging:    All relevant imaging and laboratory values personally reviewed.

## 2022-09-14 NOTE — PROGRESS NOTES
Brief Cardiology update    At 10:30 pm, patient went into pulseless V fib. ROSC was obtained after 1 shock and 1 cycle of manual chest compressions. Patient received a bolus of amiodarone and was started on an infusion of this medication. Along the night, patient continued to have multiple episodes of ventricular fibrillation (at least 15) which were successfully terminated with external cardioversion. Lidocaine bolus was given and drip was initiated in the first episodes of malignant arrythmia. Decision was made to activate cath lab. CORS without patent stent. Given electric storm, patient was cannulated for VA ECMO.       Jorge Reyes Castro, MD, MS  Cardiology Fellow

## 2022-09-14 NOTE — PROGRESS NOTES
Author took over care of patient s/p cath lab for ECMO cannulation. Since receiving patient, rhythmic jerking movements noted. Notified Dr. Bishop, who requested propofol and midazolam boluses. Mild improvement noted for short amount of time after same. Sedation and analgesia continue. No pressor requirements since cannulation, per cath lab RN. IABP remains in place. Continuing to monitor.

## 2022-09-14 NOTE — CONSULTS
[facility] Nurse Inpatient Assessment     Consulted for: ECMO    Patient History (according to provider note(s):      Brian Brunner is a 61 year old male with unknown PMHx who was admitted on 9/12/2022 following an OOHCA.      In brief review, pt was found by a co-worker to be hanging from a forklift unresponsive. Patient was lowered to the ground, CPR started, 911 was called, AED was placed and patient received 1 shock, along with 2 mg of epinephrine and 300 mg of Amiodarone with ROSC. EKG showed ST elevation in the later leads. Patient was brought to  [facility] and taken directly to [facility] where he had a coronary angiogram revealing 100% occluded mLAD s/p thrombectomy and JAMES, 80% LCx lesion s/p JAMES, 100% occluded OM2 s/p JAMES. A IABP, cooling cath and arterial lines placed and pateint was admitted to the ICU for further management. Unfortunately, patient went into VT storm, was shocked several times, Amio started, ROSC intermittently obtained but ultimately patient was so unstable, he was cannulated onto VA ECMO on 9/13.     Areas Assessed:      Areas visualized during today's visit: Focused: ECMO    ECMO cannula location: left Left groin ECMO cannula, Face and Feet  Positioning tolerance: Good  Date of ECMO cannulation: 9/14 left groin VA ECMO  Presence of ischemia: No  Location of ischemia: none   Pressure Injury Present::No  Pressure Injury Prevention Interventions In Place:  Z flow Positioner under head, Pillows for repositioning, TAPs Wedge Positioners in use, Heel off-loading boots, Pillows under calves for heel suspension, Mepilex Sacral Dressing and Dressing to sacrum for prevention        Pressure Injury Prevention Interventions Added:  Optifoam Dressing under ECMO Cannula     High risk mouth: constant bleeding and possible loose teeth        Treatment Plan:     ECMO pressure injury prevention plan:      Reposition patient every 1-2 hours using TAP  Wedges  Position head on Z flow positioner, mold indentation at areas of pressure points.  Pad ECMO IJ cannula with Optifoam (#[id number]) along face and scalp between skin and cannula and under Coban head wraps    Pad ECMO groin and chest cannula under rigid connectors with Optifoam or Soft cloth  Heel off-loading Boots at all times  Sacral Mepilex for Prevention, change every 5 days and prn  Low Air loss mattress      Orders: Written    RECOMMEND PRIMARY TEAM ORDER: None, at this time  Education provided: plan of care  Discussed plan of care with: Nurse  WO nurse follow-up plan: weekly  Notify WOC if wound(s) deteriorate.  Nursing to notify the Provider(s) and re-consult the WOC Nurse if new skin concern.    DATA:     Current support surface: Standard  Low air loss mattress  Containment of urine/stool: Incontinent pad in bed  BMI: There is no height or weight on file to calculate BMI.   Active diet order: Orders Placed This Encounter      Low Saturated Fat Na <2400 mg     Output: I/O last 3 completed shifts:  In: 2348.65 [I.V.:1633.65; NG/GT:215; IV Piggyback:500]  Out: 721 [Urine:446; Emesis/NG output:275]     Labs: Recent Labs   Lab 09/14/22  0459 09/13/22  0329 09/12/22 2020   ALBUMIN 2.7*   < > 3.2*   HGB 11.2*   < > 12.8*   INR 1.48*   < >  --    WBC 20.0*   < > 25.2*   A1C  --   --  7.5*   .00*   < > 156.00*    < > = values in this interval not displayed.     Pressure injury risk assessment:   Sensory Perception: 1-->completely limited  Moisture: 4-->rarely moist  Activity: 1-->bedfast  Mobility: 1-->completely immobile  Nutrition: 2-->probably inadequate  Friction and Shear: 2-->potential problem  Cholo Score: 11    Yvrose Singh RN CWOCN   Dept. Pager: [phone]  Dept. Office Number: [phone]       none

## 2022-09-14 NOTE — PROGRESS NOTES
1000 unit(s) heparin bolus 2/2 ACT's of 135 and 143 @ 1000.  Heparin gtt was also increased to 600 unit(s)/hr.

## 2022-09-14 NOTE — PROGRESS NOTES
Cardiology ICU Progress Note    Brief HPI:  Brian Brunner is a 61 year old male with unknown PMHx who was admitted on 9/12/2022 following an OOHCA.     In brief review, pt was found by a co-worker to be hanging from a forklift unresponsive. Patient was lowered to the ground, CPR started, 911 was called, AED was placed and patient received 1 shock, along with 2 mg of epinephrine and 300 mg of Amiodarone with ROSC. EKG showed ST elevation in the later leads. Patient was brought to  [facility] and taken directly to [facility] where he had a coronary angiogram revealing 100% occluded mLAD s/p thrombectomy and JAMES, 80% LCx lesion s/p JAMES, 100% occluded OM2 s/p JAMES. A IABP, cooling cath and arterial lines placed and pateint was admitted to the ICU for further management. Unfortunately, patient went into VT storm, was shocked several times, Amio started, ROSC intermittently obtained but ultimately patient was so unstable, he was cannulated onto VA ECMO on 9/13.     Subjective and Interval: As above, patient went into refractory VT overnight requiring ~ 15 shocks, amio bolus and ultimately cannulated onto VA ECMO. Pressors weaned off shortly after patient was cannulated. Nursing concerns for jerking like movements and bolus of Versed and Propofol were given. Yesterday, mural thrombus noted on echo for which Heparin started, stopped Vanc. Did not scan post VA ECMO cannulation/resucitation.    Assessment and plan by system:   Today's changes:  Update family. Left message for brother, Aaron  Pain service for stellate ganglio block  EP consult for VT storm tomorrow if no improvement  Lido level tomorrow  Change lyte protocol now that normothermic  Touch base with epileptologist regarding seizures  Consult NCC if concerns  Wean off Propofol  Increase amio to 1  Consider repeat scanning if remains stable  Nutrition consult tomorrow  Bowel reg      Neurology   # Concern for anoxic brain injury    Intubated, sedated. Cooled to 33 degrees.  Arrested shortly after rewarming 9/13 with refractory VT/VF thus continued to rewarm after second arrest. Now 36.1  CT head without intracranial pathology, as above, no repeat scan was done s/p 2nd arrest/VA ECMO cannulation    Current sedation:  Versed @ 8 mg/hr  Fentanyl @ 200 mcg/hour  Propofol @ 20    Plan:  -- Continue versed, fentanyl for sedation with a RASS goal -4 until warm  -- Wean off Prop now  -- Continue vEEG      Cardiovascular  # Refractory VT after revascularization, now s/p VA ECMO  # VF Cardiac arrest 2/2 STEMI  # CAD with STEMI s/p PCI to mLAD, LCx, OM2  # IABP in situ for shock and coronary perfusion  # Probable Cardiomyopathy- await echo  # LV mural thrombus  As above, reportedly brief arrest with ROSC after 1 shock and ACLS meds. Coronary angiogram revealing 100% occluded mLAD s/p thrombectomy and JAMES, 80% LCx lesion s/p JAMES, 100% occluded OM2 s/p JAMES.  Unfortunately, patient went into refractory VT shortly after rewarming 9/13, was shocked ~15 times, Amio started, ROSC intermittently obtained but ultimately patient was so unstable, he was cannulated onto peripheral VA ECMO.    - IABP in situ 1:1  - Lactic remains WNL 1.4  - Trop not yet peaked 1636-->2268 (though after ECMO)    TTE: EF 15-20%, severe diffuse hypokinesis, akinesis of the apical segments, basal-mid inferolateral segments consistent with LAD and LCx disease. There is a mural thrombus in the LV apex.  EKG: Sinus rhythm    EMO:   Flows 3.4-4.1   Sweep @ 2LPM   Fio2 60%   Heparin previously off due to supatherapuetic ACT's, goal 180-200    Current Pressors/inotropes/antiarrythmic:   Lido @ 1   Amio @ 0.5  Plan:  -- Continue ASA 81mg and ticagrelor 90mg BID   -- Heparin for ECMO  -- Repeat echo this am  -- Hold GDMT for now  -- No turndown today  -- Wean lido as able first     Pulmonary  # Acute hypoxemic respiratory failure requiring intubation  # Probable aspiration pneumonia  # Rib fractures  # pl effusions  Vent Settings:  CMV/12/420/5/40%  AB.31/42/132/21  CXR: ETT too high, advance pending next xray- worsening opacities  CAP CT with rib fractures, no repeat after chest compressions?    Plan:  -- Wean vent as able  -- Daily CXR  -- Serial ABGs   -- Consider scheduled duonebs if signs of lung dz, currently PRN    -- Peridex        Gastrointestinal, Nutrition  # Shock liver 2/2 cardiac arrest  ALT  downtrending 47 (54) AST 82 (100) Tbili 0.5    Plan:  -- Monitor LFTs  -- NPO todayNutrition consult tomorrow  -- Bowel regimen - start today  -- GI Prophylaxis:  Protonix 40 mg daily     Renal, Electrolytes  # Probable Acute Renal Injury  # Hypoalbuminemia   # Lactic acidosis  -- Cr 0.86 BUN 13, lactic WNL, bicarb 20  I/O:s  UOP  385 yest cc  yesterday, 251 ml since midnight    Plan:  -- Monitor urine output  -- Avoid nephrotoxins    Infectious Disease  # Aspiration Pneumonia- in the setting of arrest.  CXR/CAP as above.   # Leukocytosis  WBC 20 (17.) No fevers, though completing TTM  -- Monitor for signs of infection  -- Daily blood cultures while on ECMO   Cultures thus far:   - No growth to date  Antibiotics               - Vancomycin  -                - Zosyn - present      Hematology  # Anemia of critical illness  # Mural thrombus  # Deranged INR  Hgb stable 11.3 (12) Plt . No e/o bleeding. INR 1.48  DVT PPX: Heparin as above    Plan:  -- Continue Heparin gtt for mural thrombus and ECMO  -- ASA/ticagrelor for JAMES     Endocrinology  # DM II  -- insulin gtt as needed  -- Hgb a1c 7.5    Integumentary:  - No skin issues    Lines/Tubes/Drains:  ECMO  IABP  CVC  RRA line  OG  ETT  Owens    ICU  Feeding: NPO  Analgesia: Fent   Sedation: Versed   Thrombopx: Start Heparin   Head of bed: Reverse trend   Ulcers: PPI  Glucose: Insulin gtt      Family will be updated by me    Patient seen and discussed with staff physician.    Time Spent on this Encounter   I spent 75 minutes on the unit/floor managing the care of Lisandro FRANKLIN  Brunner. Over 50% of my time was spent on the following:   - Counseling the patient and/or family regarding: diagnostic results, prognosis, risks and benefits of treatment options, recommended follow-up and medical compliance  - Coordination of care with the: consultant(s), care coordinator/ and nurse  Specifically POC, Medications, prognosis, aftercare    VLADIMIR Napier CNP, APRN, CNP  [facility]  Interventional Cardiology-CSI Service  Pager [phone]     Objective:  Most recent vital signs:  /61   Pulse 68   Temp 97  F (36.1  C)   Resp 13   Wt 98.5 kg (217 lb 2.5 oz)   SpO2 100%   Temp:  [90.5  F (32.5  C)-97  F (36.1  C)] 97  F (36.1  C)  Pulse:  [] 68  Resp:  [12-31] 13  BP: (108-113)/(61-65) 108/61  MAP:  [57 mmHg-275 mmHg] 90 mmHg  Arterial Line BP: ()/(28-56) 151/47  FiO2 (%):  [40 %-100 %] 80 %  SpO2:  [86 %-100 %] 100 %  Wt Readings from Last 2 Encounters:   09/12/22 98.5 kg (217 lb 2.5 oz)       Intake/Output Summary (Last 24 hours) at 9/13/2022 0745  Last data filed at 9/13/2022 0700  Gross per 24 hour   Intake 1049.55 ml   Output 645 ml   Net 404.55 ml     Physical exam:  General: In bed, in NAD  HEENT: PERRL, no scleral icterus or injection  CARDIAC: RRR, no m/r/g appreciated. Peripheral pulses dopplered  RESP: Mechanical ventilation; CTAB, no wheezes, rhonchi or crackles appreciated.  GI: soft, BS hypoactive  : Owens  EXTREMITIES: NO LE edema, pulses 2+. Femoral access site w/o bleeding, dressing c/d/i.   SKIN: No acute lesions appreciated  NEURO: Intubated and sedated    Labs (Past three days):  CBC  Recent Labs   Lab 09/14/22  0459 09/14/22  0116 09/13/22  2244 09/13/22  2242 09/13/22  1605   WBC 20.0* 23.4* 23.3*  --  16.6*   RBC 4.06* 4.49 4.37*  --  4.39*   HGB 11.2* 12.5* 12.1* 11.9* 12.2*   HCT 34.0* 37.5* 37.3* 35* 37.4*   MCV 84 84 85  --  85   MCH 27.6 27.8 27.7  --  27.8   MCHC 32.9 33.3 32.4  --   32.6   RDW 13.6 13.5 13.5  --  13.4    215 207  --  183     BMP  Recent Labs   Lab 09/14/22  0500 09/14/22  0459 09/14/22  0458 09/14/22  0256 09/14/22  0214 09/14/22  0117 09/13/22  2325 09/13/22  2244 09/13/22  2242 09/13/22  1705 09/13/22  1605 09/13/22  0406 09/13/22  0329 09/12/22 2029 09/12/22 2020   NA  --  136  --   --   --  137  --  138 140  --  138  --  136  --  134*   POTASSIUM  --  3.9  --   --   --  4.0  --  4.9 4.0  --  4.2  --  3.5  --  4.6   CHLORIDE  --  105  --   --   --  106  --  107  --   --  108*  --  105  --  101   CO2  --  19*  --   --   --  21*  --  23  --   --  25  --  20*  --  22   ANIONGAP  --  12  --   --   --  10  --  8  --   --  5*  --  11  --  11   * 165* 160* 132*   < > 137*   < > 137* 152*   < > 132*  136*   < > 232*   < > 294*   BUN  --  18.3  --   --   --  17.4  --  16.5  --   --  15.1  --  13.3  --  12.4   CR  --  1.06  --   --   --  0.93  --  0.95  --   --  0.86  --  0.86  --  0.94   GFRESTIMATED  --  80  --   --   --  >90  --  >90  --   --  >90  --  67  --  63   JACKIE  --  8.7*  --   --   --  8.6*  --  8.6*  --   --  8.6*  --  8.4  --  8.2   MAG  --  2.3  --   --   --   --   --  1.9  --   --  2.0  --  1.9  --  1.7   PHOS  --  2.6  --   --   --   --   --  4.2  --   --   --   --  2.6  --  2.2*    < > = values in this interval not displayed.     Troponins:     INR  Recent Labs   Lab 09/14/22  0459 09/13/22  0329 09/12/22  1716   INR 1.48* 1.25* 1.23*     Liver panel  Recent Labs   Lab 09/14/22  0459 09/14/22  0117 09/13/22  2244 09/13/22  1605   PROTTOTAL 5.6* 6.1* 5.9* 6.2*   ALBUMIN 2.7* 3.0* 2.9* 3.0*   BILITOTAL 0.5 0.5 0.3 0.3   ALKPHOS 75 86 82 88   AST 82* 100* 95* 100*   ALT 47 54* 50 49       Imaging/procedure results:  Cardiac Catheterization    Ost RCA lesion is 50% stenosed.    Dist RCA lesion is 60% stenosed.    RPDA lesion is 60% stenosed.    3rd Diag lesion is 99% stenosed.    1st Sept lesion is 50% stenosed.    1st Mrg lesion is 100% stenosed.    2nd  Diag lesion is 40% stenosed.    2nd Mrg lesion is 40% stenosed.    1st LPL lesion is 30% stenosed.     1. Patent stents in LAD and LCx/OM, no new lesions noted on diagnostic   angiogram as compared to post PCI angiogram on 9/12/22.  2. Successful ECMO cannulation at left femoral vein and artery with distal   perfusion sheath.  Cardiac Catheterization    Therapeutic hypothermia was initiated using thermogard. Details of the   thermogard cannula insertion can be found in the central venous catheter   insertion section. Successful initiation of therapeutic hypothermia.    Central Venous Catheter was placed. Ultrasound was used to visualize the   right femoral vein. Placement was successful.    Arterial Line was placed. Ultrasound was used to visualize the right   radial artery. Placement was successful.    3rd Mrg lesion is 100% stenosed.    1st Mrg lesion is 100% stenosed.    2nd Mrg lesion is 40% stenosed.    Prox Cx to Mid Cx lesion is 80% stenosed.    3rd Diag lesion is 99% stenosed.    1st Sept lesion is 50% stenosed.    Mid LAD lesion is 100% stenosed.    2nd Diag lesion is 40% stenosed.    Dist LAD lesion is 70% stenosed.    Ost RCA lesion is 50% stenosed.    Dist RCA lesion is 60% stenosed.    RPDA lesion is 60% stenosed.    1st LPL lesion is 30% stenosed.    LPAV lesion is 70% stenosed.     1. 100% thrombotic occlusion of mid LAD s/p thrombectomy and DESx1   (2.83p77ta [employer]).  2. 80% lesion of the LCX s/p JAMES x1 (2.69r10zp [employer]).  3. 100% occlusion of OM3 s/p DESx1 (2.0x12mm Resolute Greg)  4. Lines: IABP in RFA, Thermoguard in RFV, right radial a-line  XR Chest Port 1 View  RESIDENT PRELIMINARY INTERPRETATION  Impression:   1. Intra-aortic balloon pump marker approximately 1.5 cm above the  level of the lexy.  2. Increasing left basilar/retrocardiac consolidation/atelectasis.  3. Persistent enlarged cardiac silhouette and diffuse linear  interstitial opacities related to atelectasis/edema.

## 2022-09-14 NOTE — PROGRESS NOTES
Bolused patient with 10 unit(s)/kg of heparin 2/2 ACT of 130.  Increased rate to 1000 unit(s)/hour.

## 2022-09-14 NOTE — PROGRESS NOTES
Major Shift Events:  Pt had code at 2229, vfib. CPR done. Gave epi, amio, shock x1. Pt started rewarming process at 2115. Paused rewarming during arrest. Started amio drip at 2300 at 1mg/min. Restarted rewarming at 2345, with goal of 37C at 0.5C/hr. After Xray, pt again went into vfib, gave shock at 0104. Continue rewarming at slower rate of 0.25C/hr. Pt then continually going into vfib. Defibrillation at 0151, 0153, 0205, Lidocaine bolus given, Started lidocaine drip at 0205 at 1mg/min. Defibrillation at 0211 x2, 0215 gave 2 mg Mag, Defib 0218, Started prop at 0220 at 20 mcg/kg/min. Defib 0234 x2, 0244, Started vaso at 0245 at 2.4 units/hr. Defib at 0255. Amio bolus of 150mg given at 0300. Gave 500 mL NS bolus. Defib at 0305. Pt converts back into sinus rhythm after each shock.  Continued on fent and versed for sedation. Gave boluses during defibrillations. Took pt down to cath lab at 0300.    Pt is sedated. When moved, pt opens eyes, wiggles toes. PERRL. Sinus rhythm, in between vfib. IABP 1:1. MAP goal >65, <100. CMV settings. MD increased rate to 24 after code. OG to SUPA Owens, very minimal output, MD aware. No BM. Insulin drip, Alg 3. Hep drip, not therapeutic.    Plan: Pt going on ECMO.    For vital signs and complete assessments, please see documentation flowsheets.

## 2022-09-14 NOTE — PROGRESS NOTES
ECMO Attending Progress Note  9/14/2022    Brian D Brunner is a 61 year old male who was cannulated for ECMO 9/14 due to VT storm after he presented with OHCA with VT and 2 shocks with ROSC 9/12    Cannulation Site:  17 Fr in the L femoral artery  25 Fr in the L femoral vein  8Fr distal reperfusion cannula    Interval events: VT storm overnight with ROSC after each shock but 15 episodes of VT therefore cannulated on ecmo. Had another episode of VF this am with ROSC after one shock. EEG without seizures.      Physical Exam:  Temp:  [90.5  F (32.5  C)-97.9  F (36.6  C)] 97.7  F (36.5  C)  Pulse:  [] 79  Resp:  [12-31] 12  BP: (108-113)/(61-65) 108/61  MAP:  [57 mmHg-275 mmHg] 83 mmHg  Arterial Line BP: ()/(31-56) 134/46  FiO2 (%):  [40 %-100 %] 80 %  SpO2:  [86 %-100 %] 100 %    Intake/Output Summary (Last 24 hours) at 9/14/2022 0846  Last data filed at 9/14/2022 0800  Gross per 24 hour   Intake 2287.62 ml   Output 798 ml   Net 1489.62 ml    Vent Mode: CMV/AC  (Continuous Mandatory Ventilation/ Assist Control)  FiO2 (%): 80 %  Resp Rate (Set): 12 breaths/min  Tidal Volume (Set, mL): 400 mL  PEEP (cm H2O): 8 cmH2O  Resp: 12       Labs:  Recent Labs   Lab 09/14/22  0502 09/14/22 0458 09/14/22 0115 09/13/22 2246 09/13/22 2242   PH  --  7.43 7.46* 7.29* 7.33*   PCO2  --  29* 30* 48* 43   PO2  --  239* 286* 203* 322*   HCO3  --  20* 21 23 23   O2PER 80  60 80 100 100  --       Recent Labs   Lab 09/14/22 0459 09/14/22  0116 09/13/22 2244 09/13/22 2242 09/13/22  1605   WBC 20.0* 23.4* 23.3*  --  16.6*   HGB 11.2* 12.5* 12.1* 11.9* 12.2*     Creatinine   Date Value Ref Range Status   09/14/2022 1.06 0.67 - 1.17 mg/dL Final   09/14/2022 0.93 0.67 - 1.17 mg/dL Final   09/13/2022 0.95 0.67 - 1.17 mg/dL Final   09/13/2022 0.86 0.67 - 1.17 mg/dL Final       Blood Flow (Circuit) LPM: 3.42 LPM  Gas Flow  LPM: 2 LPM  Gas FiO2   %: 60 %  ACT  (seconds): 224 seconds  Blood Temp  (degrees C): 37 C  Pulse Oximetry   (SpO2%): 100 %  Arterial Pressure  mmH mmHg      ECMO Issues including assessments and plan on DOS 2022:  Neuro: Sedated for mechanical ventilation and ECMO.  No acute distress.  NIRS stable b/l  RASS goal: 0-1- myoclonic jerking- EEG -ve for seizures.  CV: Cardiogenic shock.  Hemodynamically stable off pressors pulse pressure 25mmHg  Pulm: Keep vent settings at rest settings as above.  FEN/Renal: Electrolytes stable w/ replacement protocols in place, Cr stable,  Drifting UOP   Heme: ACT goal: 180-200, Hemoglobin 10 .  Minimal oozing around the ECMO cannulas.  ID: Receiving empiric antibiotics  Cannulae: Position is acceptable on exam and the available imaging.  Distal perfusion cannula is in place and patent.  Extremities are well-perfused.     I have personally reviewed the ECMO flows, oxygenation and CO2 clearance, anticoagulation, and cannula position.  I have also personally assessed the patient's systemic response with hemodynamics, oxygenation, ventilation, and bleeding.       The patient requires continued ECMO support and management in the ICU.     Emily Lizama MD  Cardiology critical care  Pager

## 2022-09-14 NOTE — PROGRESS NOTES
ECMO Shift Summary: 3918-7306       ECMO Equipment:  Console serial number: [id number]  Circuit Lot number: [id number]  Oxygenator Lot number: [id number]      Patient remains on [facility] ECMO, all equipment is functioning and alarms are appropriately set. RPM's 3350 with flow range 3.5-3.81 L/min. Sweep gas is at 0.5 LPM and FiO2 60%. Circuit remains free of visible air, clot and fibrin. Cannulas are secure with no bleeding from site. Extremities are perfused.     Significant Shift Events: Patient was shocked x1 for VT @ 0933. Amio gtt increased to 1 Heparin started at 0935 @ 500 unit(s)/hr.  Flush solution for DPC wasn't available at the time and the ACT was 169. Heparinized solution for DPC was initiated at ~ 1000.   Been escalating heparin all day, and ACT is still not in range.  Patient required boluses of 10 unit(s)/kg x2.  Pan CT completed ~ 1430.      Vent settings:  Vent Mode: CMV/AC  (Continuous Mandatory Ventilation/ Assist Control)  FiO2 (%): 50 %  Resp Rate (Set): 12 breaths/min  Tidal Volume (Set, mL): (S) 450 mL  PEEP (cm H2O): (S) 7 cmH2O  Resp: 12  .    Heparin is running at 1000 u/hr, ACT range 130-169.    Urine output is per RN's charting, blood loss was minimal. Product given included none.       Intake/Output Summary (Last 24 hours) at 9/14/2022 1644  Last data filed at 9/14/2022 1600  Gross per 24 hour   Intake 2717.86 ml   Output 793 ml   Net 1924.86 ml       ECHO:  No results found for this or any previous visit.  No results found for this or any previous visit.      CXR:  Recent Results (from the past 24 hour(s))   XR Chest Port 1 View    Narrative    Exam: XR CHEST PORT 1 VIEW, 9/14/2022 1:05 AM    Indication: IABP positioning; tech note: pt coded after CXR was  complete    Comparison: Chest x-ray 9/12/2022    Findings:   ET tube tip projects over the midthoracic trachea. Enteric tube looped  within the stomach with side-port projecting over stomach, and tip  excluded from the field-of-view.  Intra-aortic balloon pump marker  approximately 1.5 cm above the level of the lexy, previously up to 2  cm.    External defibrillator pad. Cardiomediastinal widening. No  pneumothorax or pleural effusions. Increasing retrocardiac/left  basilar consolidation. Otherwise persistent diffuse linear  interstitial opacities.      Impression    Impression:   1. Intra-aortic balloon pump marker approximately 1.5 cm above the  level of the lexy.  2. Increasing left basilar/retrocardiac consolidation/atelectasis.  3. Persistent enlarged cardiac silhouette and diffuse linear  interstitial opacities related to atelectasis/pulmonary edema.    I have personally reviewed the examination and initial interpretation  and I agree with the findings.    GILBERT GAYLE MD         SYSTEM ID:  [id number]   Cardiac Catheterization    Narrative      Ost RCA lesion is 50% stenosed.    Dist RCA lesion is 60% stenosed.    RPDA lesion is 60% stenosed.    3rd Diag lesion is 99% stenosed.    1st Sept lesion is 50% stenosed.    1st Mrg lesion is 100% stenosed.    2nd Diag lesion is 40% stenosed.    2nd Mrg lesion is 40% stenosed.    1st LPL lesion is 30% stenosed.     1. Patent stents in LAD and LCx/OM, no new lesions noted on diagnostic   angiogram as compared to post PCI angiogram on 9/12/22.  2. Successful ECMO cannulation at left femoral vein and artery with distal   perfusion sheath.     XR Chest Port 1 View    Narrative    Portable chest 9/14/2022 at 0814 hours    INDICATION: Check endotracheal tube placement and ECLS cannula  placement.    COMPARISON: Earlier today 0102 hours    FINDINGS: Low inspiratory volumes secondary to portable AP technique.  Heart size remains borderline enlarged. Defibrillator paddle overlies  the lower chest midline.  Endotracheal tube tip is approximately 5.1 cm above the lexy. NG/OG  tube progresses beyond the inferior margin of the image. Inferior  approach ECMO catheter tip is somewhat obscured by the  defibrillator  paddle but appears approximately at the SVC/right atrial junction.  Marker over the inferior margin of the expected aortic knob is  unchanged.  There is some silhouetting left hemidiaphragm an overall slightly  increased retrocardiac density unchanged from recent previous.      Impression    IMPRESSION: Endotracheal tube at upper thoracic trachea with ECMO  catheter tip from below near the SVC/right atrial junction. Continued  probable retrocardiac atelectasis, recommend follow-up to clearing to  exclude underlying infection.    ROSCOE SANCHEZ MD         SYSTEM ID:  [id number]   US Lower Extremity Arterial Duplex Bilateral    Narrative    ULTRASOUND LOWER EXTREMITY ARTERIAL DUPLEX BILATERAL  9/14/2022 9:08  AM    CLINICAL HISTORY: Baseline to assess blood flow to Lower Extremities  ([facility] ECMO).     COMPARISONS: None available.    REFERRING PROVIDER: JORGE REYES CASTRO    TECHNIQUE: Bilateral leg arteries evaluated with color Doppler and  Doppler waveform ultrasound    FINDINGS: Abnormal waveforms consistent with ECMO and aortic balloon  pump.    RIGHT:       Common femoral artery: OBSCURED       Profundus femoral artery: OBSCURED         Superficial femoral artery, origin: OBSCURED       Superficial femoral artery, proximal thigh: 49/0 cm/s       Superficial femoral artery, mid thigh: 72/35 cm/s       Superficial femoral artery, distal thigh: 54/0 cm/s         Popliteal artery: 75/41 cm/s         Posterior tibial artery, ankle: 51/19 cm/s       Anterior tibial artery, ankle: 85/0 cm/s    LEFT:       Common femoral artery: OBSCURED       Profundus femoral artery: OBSCURED         Superficial femoral artery, origin: OBSCURED       Superficial femoral artery, mid thigh: OBSCURED       Superficial femoral artery, distal thigh: 48/0 cm/s         Popliteal artery: 44/0 cm/s         Posterior tibial artery, ankle: 43/0 cm/s       Anterior tibial artery, ankle: 48/0 cm/s      Impression    IMPRESSION:  Dopplerable flow in bilateral anterior and posterior  tibial arteries at the ankles.    REID RIBERA MD         SYSTEM ID:  [id number]   Echo Limited   Result Value    Biplane LVEF 16%    Narrative    [id number]  [id number]  [id number]  [id number]^BRYNN SPENCER^RIGO^ЕКАТЕРИНА     [facility],[place]  Echocardiography Laboratory  [address] [place] [address]     Name: BRUNNER, BRIAN D  MRN: [id number]  : 1960  Study Date: 2022 09:59 AM  Age: 61 yrs  Gender: Male  Patient Location: [facility]  Reason For Study: Cardiac Arrest  Ordering Physician: RIGO SOUTH  Performed By: Isaiah Orozco     BSA: 2.1 m2  Height: 67 in  Weight: 216 lb  HR: 76  BP: 98/45 mmHg  ______________________________________________________________________________  Procedure  Complete Portable Echo Adult. Contrast Optison. Optison (NDC #8177-2519-68)  given intravenously. Patient was given 5 ml mixture of 3 ml Optison and 6 ml  saline. 4 ml wasted.  ______________________________________________________________________________  Interpretation Summary  Biplane LVEF is 16%.  On [facility] ECMO at 3.8LPM.  LV apical thrombus noted.  Right ventricular function, chamber size, wall motion, and thickness are  normal.  ______________________________________________________________________________  Left Ventricle  Biplane LVEF is 16%. On [facility] ECMO at 3.8LPM.  LV apical thrombus noted.     Right Ventricle  Right ventricular function, chamber size, wall motion, and thickness are  normal.     Pericardium  No pericardial effusion is present.     ______________________________________________________________________________  Report approved by: Amish Graves 2022 12:53 PM     ______________________________________________________________________________      CT Head w/o Contrast    Narrative    CT HEAD W/O CONTRAST 2022 2:50 PM    History: s/p cardiac arrest     Comparison: 2022    Technique: Using  multidetector thin collimation helical acquisition  technique, axial, coronal and sagittal CT images from the skull base  to the vertex were obtained without intravenous contrast.   (topogram) image(s) also obtained and reviewed.    Findings: There is no intracranial hemorrhage, mass effect, or midline  shift. Gray/white matter differentiation in both cerebral hemispheres  is preserved. Ventricles are proportionate to the cerebral sulci. The  basal cisterns are clear.    The bony calvaria and the bones of the skull base are normal.  Hypoplastic frontal and sphenoid sinuses. Unchanged mild mucosal  thickening ethmoid and sphenoid sinuses. Mastoid air cells are clear.  Cisterna magna.      Impression    Impression:  No acute intracranial pathology.          I have personally reviewed the examination and initial interpretation  and I agree with the findings.    ASHLEY GONZALEZ MD         SYSTEM ID:  [id number]   CT Chest Abdomen Pelvis w/o Contrast    Narrative    EXAMINATION: CT CHEST ABDOMEN PELVIS W/O CONTRAST, 9/14/2022 2:50 PM    TECHNIQUE:  Helical CT images from the thoracic inlet through the  symphysis pubis were obtained without IV contrast.     COMPARISON: Chest abdomen pelvis CT 9/12/2022    HISTORY: s/p cardiac arrest    FINDINGS:  SUPPORT DEVICES:  Endotracheal tube within the mid thoracic trachea. Enteric tube  probably coiled in the stomach with tip near the pylorus/duodenal  bulb. Left common femoral arterial ECMO cannula with tip in the  proximal left common iliac artery. Left common femoral venous ECMO  cannula with tip at the inferior cavoatrial junction. Right common  femoral central venous catheter with tip in the intrahepatic IVC.  Right common femoral arterial approach intra-aortic balloon pump with  inferior metallic marker at the level of the renal artery origins.  Superior metallic marker distal to the origin of the left subclavian  artery.    CHEST:  LUNGS: There is fluid within the  trachea extending into the mainstem  bronchi with new multifocal occlusive plugging in the left lower lobar  bronchus. Stable small bilateral pleural effusions. Unchanged smooth  interlobular septal thickening with bibasilar dependent consolidative  opacities. Additional patchy groundglass opacities throughout the  lungs.    MEDIASTINUM: Heart size is within normal limits. Trace pericardial  effusion. Ascending aorta and main pulmonary artery diameters are  within normal limits. Coronary artery stents. Scattered mild  atherosclerotic calcifications of the aortic arch. Normal  configuration of the great vessels off of the aortic arch. No  suspicious mediastinal, hilar, or axillary lymph nodes.    Visualized thyroid and esophagus are unremarkable.    ABDOMEN/PELVIS:  LIVER: No suspicious focal hepatic lesion.    BILIARY: Intraluminal hyperdense fluid, likely the vicarious excretion  of contrast. No calcified intraluminal gallstone. No intrahepatic or  extrahepatic biliary ductal dilation.    PANCREAS: Mild peripancreatic mesenteric stranding with a few  scattered prominent lymph nodes about the celiac axis. No focal  pancreatic lesion. The main pancreatic duct is not dilated.    SPLEEN: Within normal limits.    ADRENAL GLANDS: No focal adrenal nodule.    URINARY TRACT: Several right parapelvic cyst. Small amount of  hyperdense contrast within bilateral renal collecting systems. No  hydronephrosis or hydroureter. No definitive renal stone. Perinephric  stranding. Urinary bladder is decompressed with intraluminal contrast  and Owens catheter in place.    REPRODUCTIVE ORGANS: Prostatomegaly.    STOMACH: Within normal limits.    BOWEL: Normal caliber large and small bowel. No abnormal bowel wall  thickening or enhancement. Appendix is unremarkable.    PERITONEUM/FLUID: Scattered small volume free fluid extending into the  hepatorenal recess and left paracolic gutter.    VESSELS: Normal caliber of the infrarenal abdominal  aorta.    BONES/SOFT TISSUES: Acute minimally displaced fractures of the  anterior left second through sixth ribs and right second through  seventh ribs. Benign bone island in the left acetabulum. No suspicious  sclerotic or lucent osseous lesion. Scattered degenerative changes of  the spine.      Impression    IMPRESSION:   1. Layering mucus in the trachea extending into the mainstem bronchi  with multiple occlusions of the left lower lobar bronchus.  2. Unchanged small bilateral pleural effusions with bibasilar  consolidative opacities possibly representing infection versus  atelectasis.  3. Smooth interlobular septal thickening with patchy groundglass  opacities throughout the lungs concerning for pulmonary edema.  4. New mild peripancreatic mesenteric stranding with a few scattered  prominent peripancreatic lymph nodes and fluid extending into the  hepatorenal space and left paracolic gutter which may be sequelae of  volume overload although acute pancreatitis is difficult to entirely  exclude. Consider further laboratory evaluation with lipase.  5. Unchanged appearance of the bilateral rib fractures related to CPR.  No pneumothorax.    I have personally reviewed the examination and initial interpretation  and I agree with the findings.    GRAY THIBODEAUX MD         SYSTEM ID:  [id number]       Labs:  Recent Labs   Lab 09/14/22  1559 09/14/22  1357 09/14/22  1156 09/14/22  0935   PH 7.43 7.43 7.41 7.47*   PCO2 34* 35 36 29*   PO2 147* 117* 133* 132*   HCO3 23 24 23 21   O2PER 50  50  60 50 50 50       Lab Results   Component Value Date    HGB 10.6 (L) 09/14/2022    PHGB 50 (H) 09/14/2022     09/14/2022    FIBR 540 (H) 09/14/2022    INR 1.29 (H) 09/14/2022    PTT 45 (H) 09/14/2022    DD 2.94 (H) 09/14/2022         Plan is continue [facility] support.      Jerome Holly, RT  ECMO Specialist  9/14/2022 4:44 PM

## 2022-09-14 NOTE — PROGRESS NOTES
ECMO Shift Summary: (~0400 to 0715)    ECMO components include:  Circuit Lot Number:  #[id number]  Console Serial Number:  #[id number]  Oxygenator Lot Number: # [id number]    Patient remains on [facility] ECMO, all equipment is functioning and alarms are appropriately set. RPMs 3350 - 3600 with flow range 3.44 - 4.1 L/min. Sweep gas is at 2 LPM and FiO2 60%. Circuit remains free of visible air, clot and fibrin. Cannulas are secure with no bleeding from site. Extremities are cool, some mottling noted over knees bilaterally.     Significant Shift Events:   Pt cannulated in CCL after experiencing multiple runs of VT/VF. Intermittent myoclonic jerking of extremities had been noted in ICU prior to cannulation. More persistent, rhythmic, myoclonic jerking started in CCL after ECMO procedure and while transferring back up to ICU. MD made aware, boluses of Propofol + Versed given with some improvement.     Bleeding/Anticoagulation:  Heparin is still off d/t high ACT at time of note. ACT in the 220 - 240s.  Blood loss was minimal. No blood products required this shift.    Volume Status:  Urine output is scant, <10 mL/hr.     No issues w/ chugging post-cannulation, no crystalloid required this shift.      Intake/Output Summary (Last 24 hours) at 9/14/2022 0545  Last data filed at 9/14/2022 0507  Gross per 24 hour   Intake 2175.98 ml   Output 581 ml   Net 1594.98 ml       Labs:    Recent Labs   Lab 09/14/22  0502 09/14/22  0458 09/14/22  0115 09/13/22  2246 09/13/22 2242   PH  --  7.43 7.46* 7.29* 7.33*   PCO2  --  29* 30* 48* 43   PO2  --  239* 286* 203* 322*   HCO3  --  20* 21 23 23   O2PER 80  60 80 100 100  --        Lab Results   Component Value Date    HGB 11.2 (L) 09/14/2022     09/14/2022    FIBR 540 (H) 09/14/2022    INR 1.48 (H) 09/14/2022    PTT 36 09/13/2022       Vent settings:  Vent Mode: CMV/AC  (Continuous Mandatory Ventilation/ Assist Control)  FiO2 (%): 80 %  Resp Rate (Set): 12 breaths/min  Tidal Volume (Set,  mL): 400 mL  PEEP (cm H2O): 8 cmH2O  Resp: 12  .        Plan is to continue [facility] ECMO, titrate sweep/flows as indicated.      Yvonne Pabon RRT-ACCS / ECMO Specialist  9/14/2022 5:45 AM

## 2022-09-15 NOTE — PROGRESS NOTES
Cardiology ICU Progress Note    Brief HPI:  Brian Brunner is a 61 year old male with unknown PMHx who was admitted on 9/12/2022 following an OOHCA.     In brief review, pt was found by a co-worker to be hanging from a forklift unresponsive. Patient was lowered to the ground, CPR started, 911 was called, AED was placed and patient received 1 shock, along with 2 mg of epinephrine and 300 mg of Amiodarone with ROSC. EKG showed ST elevation in the later leads. Patient was brought to  [facility] and taken directly to CCL where he had a coronary angiogram revealing 100% occluded mLAD s/p thrombectomy and JAMES, 80% LCx lesion s/p JAMES, 100% occluded OM2 s/p JAMES. A IABP, cooling cath and arterial lines placed and pateint was admitted to the ICU for further management. Unfortunately, patient went into VT storm, was shocked several times, Amio started, ROSC intermittently obtained but ultimately patient was so unstable, he was cannulated onto VA ECMO on 9/13.     Subjective and Interval:  NAEO. Yesterday, stellate ganglion block, pan scanned which revealed layering mucus in the trachea extending into the mainstem bronchi, multiple occlusions of the left lower lobar bronchus, small bilateral pleural effusions and opacities, GGO, pulm edema, consulted NCC and no e/o seizures. Bowel reg started, increased Amio to 1.    Assessment and plan by system:   Today's changes:  Will consider bronch if oxygenation problematic given CT findings  Added on lipase (per CT read with peripancreatic stranding)  Diurese to goal net neg 500 ml- start with Lasix 60 mg IV once  CCL for revasc tomorrow, NPO at midnight  Stop Lido now  Follow up lido level as available  Nutrition consult for TF initiation  Stop Versed  EP consult for VT storm     Neurology   # Concern for anoxic brain injury    # Poor dentition  Intubated, sedated. S/p TTM, cooled to 33 degrees. Arrested shortly after rewarming 9/13 with refractory VT/VF thus continued to rewarm after  second arrest.   CT head initial without intracranial pathology, repeat scan 9/14 s/p 2nd arrest/VA ECMO cannulation again without intracranial pathology  vEEG without seizures  - Dental consulted and removed canine 9/14    Current sedation:  Versed @ 8 mg/hr  Fentanyl @ 200 mcg/hour  Propofol @ 20 mcg/kg/mn    Plan:  -- Continue versed, fentanyl for sedation with a RASS goal -3 until warm  -- Continue vEEG   -- Stop Versed now  -- NCC consulted and appreciate assistance  -- Daily sedation vacation     Cardiovascular  # Refractory VT/VT storm after revascularization, now s/p VA ECMO  # VF Cardiac arrest 2/2 STEMI  # CAD with STEMI s/p PCI to mLAD, LCx, OM2  # IABP in situ for shock and coronary perfusion  # Probable Cardiomyopathy- await echo  # LV mural thrombus  As above, reportedly brief arrest with ROSC after 1 shock and ACLS meds. Coronary angiogram revealing 100% occluded mLAD s/p thrombectomy and JAMES, 80% LCx lesion s/p JAMES, 100% occluded OM2 s/p JAMES.  Unfortunately, patient went into refractory VT shortly after rewarming 9/13, was shocked ~15 times, Amio started, ROSC intermittently obtained but ultimately patient was so unstable, he was cannulated onto peripheral VA ECMO.    - IABP in situ 1:1  - Lactic remains WNL 1.4  - Trop peaked 2268, now downtrending    TTE: initial: EF 15-20%, severe diffuse hypokinesis, akinesis of the apical segments, basal-mid inferolateral segments consistent with LAD and LCx disease. There is a mural thrombus in the LV apex.  Repeat TTE 9/14- EF 16%. Mural thrombus remains  EKG: Sinus rhythm    EMO:   Flows 3.6-3.8   Sweep @ 0.5 LPM   Fio2 60%   Heparin gtt with goal -200    Current Pressors/inotropes/antiarrythmic:   Lido @ 1   Amio @ 1  Plan:  -- Stop Lido now  -- Follow up Lido level   -- EP consulted for VT storm  -- Cors tomorrow for revasc residual CAD given VT  -- Continue ASA 81mg and ticagrelor 90mg BID   -- Heparin for ECMO  -- Diurese today  -- Hold GDMT for  now     Pulmonary  # Acute hypoxemic respiratory failure requiring intubation  # Probable aspiration pneumonia  # Rib fractures  # pl effusions  # Pulmonary edema  Vent Settings: CMV/12/420/5/40%  AB.48/35/121/26  CXR: worsening opacities, congestion  CAP CT with rib fractures, layering mucus in the trachea extending into the mainstem bronchi, multiple occlusions of the left lower lobar bronchus    Plan:  -- Wean vent as able  -- Diurese as above  -- Daily CXR  -- Serial ABGs   -- Consider scheduled duonebs if signs of lung dz, currently PRN    -- Peridex        Gastrointestinal, Nutrition  # Shock liver 2/2 cardiac arrest  # Mild peripancreatic mesenteric stranding   LFT's now normal    Plan:  -- Monitor LFTs  -- Nutrition consult today  -- Bowel regimen in place  -- GI Prophylaxis:  Protonix 40 mg daily     Renal, Electrolytes  # Hypoalbuminemia   # Lactic acidosis- resolved  -- Cr 0.96 BUN 19, lactic WNL, bicarb 24  I/O:s  UOP  1.7 yest cc  yesterday    Plan:  -- Diurese  -- Monitor urine output  -- Avoid nephrotoxins    Infectious Disease  # Aspiration Pneumonia- in the setting of arrest.  CXR/CAP as above.   # Leukocytosis  WBC 23.7 (24) No fevers, tmax 99.3  -- Monitor for signs of infection  -- Daily blood cultures while on ECMO   Cultures thus far:   - No growth to date  Antibiotics               - Vancomycin  -                - Zosyn - present . Will keep on while ENT packing in     Hematology  # Anemia of critical illness  # Mural thrombus  # Deranged INR  # Oral bleeding  Hgb stable stable 9.5 (9.9) Plt , No e/o bleeding. INR 1.42  DVT PPX:  Heparin as above    Plan:  -- ENT consult for oral packing  -- Continue Heparin gtt for mural thrombus and ECMO  -- -200  -- ASA/ticagrelor for JAMES     Endocrinology  # DM II  -- insulin gtt as needed- currently on at 3 units/hour  -- Hgb a1c 7.5    Integumentary:  - No skin issues    Lines/Tubes/Drains:  ECMO  IABP  CVC RCFV  RRA  "line  OG  ETT  Owens    ICU  Feeding: NPO, nutrition consult today  Analgesia: Fent   Sedation: Versed   Thrombopx: Heparin   Head of bed: Reverse trend   Ulcers: PPI  Glucose: Insulin gtt      Family will be updated by me    Patient seen and discussed with staff physician.    Time Spent on this Encounter   I spent 65 minutes on the unit/floor managing the care of Brian D Brunner. Over 50% of my time was spent on the following:   - Counseling the patient and/or family regarding: diagnostic results, prognosis, risks and benefits of treatment options, recommended follow-up and medical compliance  - Coordination of care with the: consultant(s), care coordinator/ and nurse  Specifically POC, Medications, prognosis, aftercare    VLADIMIR Napier CNP, APRN, CNP  [facility]  Interventional Cardiology-[facility] Service  Pager [phone]     Objective:  Most recent vital signs:  /61   Pulse 74   Temp 99.1  F (37.3  C)   Resp 12   Ht 1.702 m (5' 7\")   Wt 102.5 kg (225 lb 15.5 oz)   SpO2 100%   BMI 35.39 kg/m    Temp:  [97.5  F (36.4  C)-99.3  F (37.4  C)] 99.1  F (37.3  C)  Pulse:  [69-81] 74  Resp:  [12-25] 12  MAP:  [67 mmHg-116 mmHg] 73 mmHg  Arterial Line BP: ()/(33-77) 98/52  FiO2 (%):  [50 %] 50 %  SpO2:  [96 %-100 %] 100 %  Wt Readings from Last 2 Encounters:   09/15/22 102.5 kg (225 lb 15.5 oz)     Intake/Output Summary (Last 24 hours) at 9/15/2022 1018  Last data filed at 9/15/2022 1000  Gross per 24 hour   Intake 2633.66 ml   Output 1104 ml   Net 1529.66 ml     Physical exam:  General: In bed, in NAD  HEENT: PERRL, no scleral icterus or injection  CARDIAC: RRR, no m/r/g appreciated. Peripheral pulses dopplered  RESP: Mechanical ventilation; CTAB, no wheezes, rhonchi or crackles appreciated.  GI: soft, BS hypoactive  : Owens  EXTREMITIES: NO LE edema, pulses 2+. Femoral access site w/o bleeding, dressing c/d/i.   SKIN: No acute " lesions appreciated  NEURO: Intubated and sedated    Labs (Past three days):  CBC  Recent Labs   Lab 09/15/22  0348 09/14/22  2207 09/14/22  1559 09/14/22  0805   WBC 23.7* 24.5* 22.3* 18.5*   RBC 3.45* 3.56* 3.74* 3.81*   HGB 9.5* 9.9* 10.3* 10.6*   HCT 29.7* 30.3* 32.0* 31.7*   MCV 86 85 86 83   MCH 27.5 27.8 27.5 27.8   MCHC 32.0 32.7 32.2 33.4   RDW 14.1 13.9 13.9 13.8    193 209 222     BMP  Recent Labs   Lab 09/15/22  0657 09/15/22  0601 09/15/22  0453 09/15/22  0352 09/15/22  0350 09/15/22  0348 09/14/22  2210 09/14/22  2207 09/14/22  1604 09/14/22  1559 09/14/22  0938 09/14/22  0935 09/14/22  0500 09/14/22  0459 09/13/22  2325 09/13/22  2244   NA  --   --   --   --   --  140  --  140  --  140  --  140   < > 136   < > 138   POTASSIUM  --   --   --   --   --  4.3  --  4.4  --  4.9  --  3.7   < > 3.9   < > 4.9   CHLORIDE  --   --   --   --   --  108*  --  108*  --  107  --  108*   < > 105   < > 107   CO2  --   --   --   --   --  24  --  25  --  22  --  20*   < > 19*   < > 23   ANIONGAP  --   --   --   --   --  8  --  7  --  11  --  12   < > 12   < > 8   * 123* 131* 122*   < > 124*   < > 146*   < > 143*  127*   < > 187*  185*   < > 165*   < > 137*   BUN  --   --   --   --   --  17.0  --  17.0  --  18.3  --  18.5   < > 18.3   < > 16.5   CR  --   --   --   --   --  0.96  --  1.04  --  1.17  --  1.13   < > 1.06   < > 0.95   GFRESTIMATED  --   --   --   --   --  90  --  82  --  71  --  74   < > 80   < > >90   JACKIE  --   --   --   --   --  8.0*  --  8.2*  --  8.5*  --  8.3*   < > 8.7*   < > 8.6*   MAG  --   --   --   --   --  2.2  --  2.2  --  2.6*  --  2.0   < > 2.3  --  1.9   PHOS  --   --   --   --   --  3.1  --   --   --   --   --  2.8  --  2.6  --  4.2    < > = values in this interval not displayed.     Troponins:     INR  Recent Labs   Lab 09/15/22  0349 09/14/22 2207 09/14/22  1559 09/14/22  0935   INR 1.42* 1.38* 1.30* 1.29*     Liver panel  Recent Labs   Lab 09/15/22  0348 09/14/22  2207  09/14/22  1559 09/14/22  0935   PROTTOTAL 5.4* 5.5* 5.6* 5.4*   ALBUMIN 2.6* 2.6* 2.5* 2.6*   BILITOTAL 0.3 0.3 0.3 0.4   ALKPHOS 74 72 78 74   AST 46 53* 65* 70*   ALT 31 34 36 41       Imaging/procedure results:  CT Chest Abdomen Pelvis w/o Contrast  Narrative: EXAMINATION: CT CHEST ABDOMEN PELVIS W/O CONTRAST, 9/12/2022 8:29 PM    TECHNIQUE:  Helical CT images from the lung apices through the  symphysis pubis were obtained without contrast.  Coronal and sagittal  reformatted images were generated at a workstation for further  assessment.    COMPARISON: Same day cardiac catheterization    HISTORY: post cardiac arrest    FINDINGS:  Lines and tubes: Endotracheal tip in the mid thoracic trachea. Enteric  tube tip near the pylorus. Right groin superior IABP marker  approximately 1.5 cm below the level the lexy in the descending  aorta. Right groin central line tip in the intrahepatic IVC. Owens  catheter within the bladder.    Thyroid: No suspicious nodules.  Heart and mediastinum: Cardiomegaly. No significant pericardial  effusion. Coronary artery calcifications /stenting. Normal esophagus.  Thoracic vasculature: Normal caliber thoracic aorta. Normal caliber  pulmonary artery.  Lymph nodes: Prominent mediastinal lymph nodes. No suspicious  lymphadenopathy.  Lungs: No pneumothorax. Small left greater than right pleural  effusions. Associated bilateral dependent atelectasis. Diffuse smooth  interlobular septal thickening. Upper lobe predominant bronchocentric  groundglass and mildly consolidative opacities.    Liver: No suspicious lesion.  Gallbladder: No cholelithiasis or evidence of acute cholecystitis. No  intra- or extra-hepatic biliary ductal dilatation.  Spleen: Borderline enlarged, measuring 13 cm  Pancreas: Unremarkable.  Adrenal glands: No discrete nodules.  Kidneys: No hydronephrosis. No nephrolithiasis. No suspicious mass.  Right parapelvic cyst. Subcentimeter hypodensity in the left lower  pole, too small  to characterize.  Bladder / Pelvic organs: Bladder is filled with excreted contrast.  Prostatomegaly.  Bowel: No evidence of obstruction. The appendix is unremarkable.  Colonic diverticulosis without evidence of acute diverticulitis.  Lymph nodes: Scattered prominent lymph nodes, likely reactive. No  suspicious lymphadenopathy.  Peritoneum / Retroperitoneum: No pneumoperitoneum. No organized fluid  collections. Small fat-containing inguinal hernias. Mild central  mesenteric haziness/stranding, likely related to resuscitation/third  spacing.  Abdominal vasculature: Normal caliber abdominal aorta.    Bones and soft tissues: Degenerative changes of the visualized spine.  Scattered sclerotic foci in the bony pelvis presumed bone islands. No  suspicious bony lesions. Acute nondisplaced to minimally displaced  fractures of the right anterior 2nd-7th and left 3rd-6th ribs.  Impression: IMPRESSION:   1. Lines and tubes: Endotracheal tip in the mid thoracic trachea.  Enteric tube tip near the pylorus. Right groin superior IABP marker  approximately 1.5 cm below the level the lexy in the descending  aorta. Right groin central line tip in the intrahepatic IVC. Owens  catheter within the bladder.  2. Acute resuscitative fractures of the right anterior 2nd-7th and  left 3rd-6th ribs. No pneumothorax.  3. Cardiomegaly with small left greater than right pleural effusions,  pulmonary edema, and bronchocentric opacities also suggestive of  pulmonary edema although aspiration is a consideration. Bibasilar  atelectasis/consolidation.  4. Mild splenomegaly.    I have personally reviewed the examination and initial interpretation  and I agree with the findings.    GILBERT GAYLE MD         SYSTEM ID:  [id number]

## 2022-09-15 NOTE — CONSULTS
"Otolaryngology Consult Note  September 15, 2022      CC: oropharyngeal bleeding    HPI: Brian D Brunner is a 61 year old male with an unknown PMHx who was admitted on 9/12/2022 for cardiac arrest that occurred at his place of work. After being transported to [facility] he was taken to the cath lab where coronary angiogram revealed 100% occluded mLAD, 80% LCx lesion and 100% occluded OM2 s/p thrombectomy and multiple JAMES. Since being inpatient he has been shocked several times due to VT storm. He most recently arrested on 9/13/2022. He was started on Heparin on 9/14/2022. ENT was consulted for oropharyngeal bleeding.    Patient is intubated and sedated. Subjective obtained from chart review and primary team. Oral bleeding was present prior to being heparinized but the bleeding increased since being started on Heparin. His mouth was packed by nursing staff 9/14 pm. Dental also pulled a canine that was \"about to fall out\" per nursing note.     History reviewed. No pertinent past medical history.    Past Surgical History:   Procedure Laterality Date     CV ARTERIAL LINE PLACEMENT N/A 9/12/2022    Procedure: Arterial Line Placement;  Surgeon: Lul Porter MD;  Location: [facility] CARDIAC CATH LAB     CV CENTRAL VENOUS CATHETER PLACEMENT N/A 9/12/2022    Procedure: Central Venous Catheter Placement;  Surgeon: Lul Porter MD;  Location: [facility] CARDIAC CATH LAB     CV CORONARY ANGIOGRAM N/A 9/12/2022    Procedure: Coronary Angiogram;  Surgeon: Lul Porter MD;  Location: [facility] CARDIAC CATH LAB     CV INTRA AORTIC BALLOON N/A 9/12/2022    Procedure: Intraprocedure Aortic Balloon Pump Insertion;  Surgeon: Lul Porter MD;  Location: [facility] CARDIAC CATH LAB     CV PCI ASPIRATION THROMECTOMY N/A 9/12/2022    Procedure: Percutaneous Coronary Intervention Aspiration Thrombectomy;  Surgeon: Lul Porter MD;  Location: [facility] CATH " "LAB     CV PCI STENT DRUG ELUTING N/A 9/12/2022    Procedure: Percutaneous Coronary Intervention Stent;  Surgeon: Lul Porter MD;  Location:  [facility]       Current Outpatient Medications   Medication Sig Dispense Refill     aspirin (ASA) 81 MG EC tablet Take 1 tablet (81 mg) by mouth daily Start tomorrow. 30 tablet 3        Not on File    Social History     Socioeconomic History     Marital status: Not on file     Spouse name: Not on file     Number of children: Not on file     Years of education: Not on file     Highest education level: Not on file   Occupational History     Not on file   Tobacco Use     Smoking status: Not on file     Smokeless tobacco: Not on file   Substance and Sexual Activity     Alcohol use: Not on file     Drug use: Not on file     Sexual activity: Not on file   Other Topics Concern     Not on file   Social History Narrative     Not on file     Social Determinants of Health     Financial Resource Strain: Not on file   Food Insecurity: Not on file   Transportation Needs: Not on file   Physical Activity: Not on file   Stress: Not on file   Social Connections: Not on file   Intimate Partner Violence: Not on file   Housing Stability: Not on file       History reviewed. No pertinent family history.    ROS: patient sedated and unable to provide ROS    PHYSICAL EXAM:  General: laying in bed, sedated  /61   Pulse 81   Temp 99  F (37.2  C)   Resp 12   Ht 1.702 m (5' 7\")   Wt 102.5 kg (225 lb 15.5 oz)   SpO2 98%   BMI 35.39 kg/m    HEAD: normocephalic, atraumatic  Face: symmetrical, no swelling, edema, or erythema.   Eyes: closed due to sedation  Ears: no external abnormalities  Nose: no anterior drainage, intact and midline septum without perforation or hematoma, no active bleeding, small amount of old blood in the nasopharynx  Mouth: moist, no ulcers, tongue midline and symmetric, feeding and breathing tube with bite block in place. Significantly poor " dentition with multiple roots exposed and at risk of falling out. Left posterior molar decayed with pulp exposed. Brisk bleeding noted from socket just anterior to the decaying molar. Additional socket noted on the anterior mandible, without bleeding. Large clots present in oral cavity and were evacuated.  Oropharynx: uvula midline, no oropharyngeal erythema or bleeding, large clots removed from oropharynx and additional blood suctioned out.  Neck: thick neck  Neuro: Sedated, gag response weakly intact  Respiratory: intubated on ventilation  Skin: no rashes or skin lesions of the face/neck  Cardio: on VA ECMO    FIBEROPTIC ENDOSCOPY:  Due to oral bleeding, fiberoptic nasoendoscopy was indicated. The rigid scope was passed under endoscopic vision through bilateral nasal passages. The turbinates were normal. The inferior and middle meati were clear bilaterally without purulence, masses, or polyps. Minor amount of old blood in nasopharynx.     ROUTINE IP LABS (Last four results)  BMP  Recent Labs   Lab 09/15/22  1003 09/15/22  0952 09/15/22  0805 09/15/22  0657 09/15/22  0350 09/15/22  0348 09/14/22  2210 09/14/22  2207 09/14/22  1604 09/14/22  1559 09/14/22  0938 09/14/22  0935   NA  --   --   --   --   --  140  --  140  --  140  --  140   POTASSIUM  --   --   --   --   --  4.3  --  4.4  --  4.9  --  3.7   CHLORIDE  --   --   --   --   --  108*  --  108*  --  107  --  108*   JACKIE  --   --   --   --   --  8.0*  --  8.2*  --  8.5*  --  8.3*   CO2  --   --   --   --   --  24  --  25  --  22  --  20*   BUN  --   --   --   --   --  17.0  --  17.0  --  18.3  --  18.5   CR  --   --   --   --   --  0.96  --  1.04  --  1.17  --  1.13   * 111* 109* 120*   < > 124*   < > 146*   < > 143*  127*   < > 187*  185*    < > = values in this interval not displayed.     CBC  Recent Labs   Lab 09/15/22  0953 09/15/22  0348 09/14/22  2207 09/14/22  1559   WBC 21.9* 23.7* 24.5* 22.3*   RBC 3.21* 3.45* 3.56* 3.74*   HGB 9.0* 9.5* 9.9*  10.3*   HCT 27.5* 29.7* 30.3* 32.0*   MCV 86 86 85 86   MCH 28.0 27.5 27.8 27.5   MCHC 32.7 32.0 32.7 32.2   RDW 14.2 14.1 13.9 13.9    203 193 209     INR  Recent Labs   Lab 09/15/22  0953 09/15/22  0349 09/14/22  2207 09/14/22  1559   INR 1.50* 1.42* 1.38* 1.30*       Assessment and Plan  Brian D Brunner is a 61 year old male with unknown PMHx admitted following cardiac arrest at work. He is currently on Heparin and VA ECMO. ENT consulted to evaluate oropharyngeal bleeding. Kerlix removed from oral cavity and clots evacuated from oral cavity as well as oropharynx. Additional blood was suctioned until source was identified as a tooth socket on the left posterior alveolar ridge. Multiple teeth were loose and decaying posing potential airway risks. Dental reported to have removed one canine at bedside 9/14. Kerlix saturated with topical thrombin and Afrin packed in and around socket on left side. No additional packing was placed.    - Saturate Kerlix with Afrin every 2-4 hours, ordered for you  - Patient to be on abx for staph coverage  - Do not remove or replace Kerlix  - ENT will return to remove packing in 3-5 days   - Recommend dental return to evaluate additional concerning teeth  - Rest of cares per primary team  - Please contact ENT with any additional questions or concerns or if patient experiences increase in bleeding    --Patient and plan to be discussed with Dr. Tim Galeas PA-C pg [phone]  Otolaryngology-Head & Neck Surgery  Please page ENT with questions by dialing [phone] and entering job code [phone] when prompted.

## 2022-09-15 NOTE — PLAN OF CARE
Shift Events:    Pt intubated (50%, P7), sedated. On VA ECMO (50%, Sweep 0.5)/IABP (100%, 1:1).  Oral bleeding, packed with gauze.     Plan: Continue to monitor. ENT pending for oral bleeding.    For vital signs and complete assessments, please see documentation flowsheets.      Problem: Communication Impairment (Mechanical Ventilation, Invasive)  Goal: Effective Communication  Outcome: Ongoing, Not Progressing  Intervention: Ensure Effective Communication  Recent Flowsheet Documentation  Taken 9/14/2022 2000 by Gio Porras RN  Communication Enhancement Strategies: nonverbal strategies used     Problem: Nutrition Impairment (Mechanical Ventilation, Invasive)  Goal: Optimal Nutrition Delivery  Outcome: Ongoing, Not Progressing   Goal Outcome Evaluation:      Problem: Device-Related Complication Risk (Intra-Aortic Balloon Pump)  Goal: Effective, Safe Device Function  Outcome: Ongoing, Progressing  Intervention: Prevent Peripheral Vascular Complications  Recent Flowsheet Documentation  Taken 9/15/2022 0400 by Gio Porras RN  Head of Bed (HOB) Positioning: HOB not elevated due to instrumentation present  Taken 9/15/2022 0200 by Gio Porras RN  Head of Bed (HOB) Positioning: HOB not elevated due to instrumentation present  Taken 9/15/2022 0000 by Gio Porras RN  Head of Bed (HOB) Positioning: HOB not elevated due to instrumentation present  Taken 9/14/2022 2200 by Gio Porras RN  Head of Bed (HOB) Positioning: HOB not elevated due to instrumentation present  Taken 9/14/2022 2000 by Gio Porras RN  Bleeding Precautions:   blood pressure closely monitored   coagulation study results reviewed   gentle oral care promoted   monitored for signs of bleeding  Head of Bed (HOB) Positioning: HOB not elevated due to instrumentation present       Shift Events:    Pt intubated (50%, P7), sedated. On VA ECMO (50%, Sweep 0.5)/IABP (100%, 1:1).  Oral bleeding, packed with  gauze.     Plan: Continue to monitor. ENT pending for oral bleeding.    For vital signs and complete assessments, please see documentation flowsheets.

## 2022-09-15 NOTE — PHARMACY-CONSULT NOTE
Pharmacy Tube Feeding Consult    Medication reviewed for administration by feeding tube and for potential food/drug interactions.    Recommendation: No changes are needed at this time.     Pharmacy will continue to follow as new medications are ordered.    Arsenio Alvarez, PharmD, BCCCP

## 2022-09-15 NOTE — PROGRESS NOTES
ECMO Attending Progress Note  9/15/2022    Brian D Brunner is a 61 year old male who was cannulated for ECMO  due to VT storm after he presented with [facility] with VT and 2 shocks with ROSC       Cannulation Site:  17 Fr in the L femoral artery  25 Fr in the L femoral vein  8Fr distal reperfusion cannula    Interval events: stellate ganglion block yesterday electrically silent since yesterday ~9am. Ct head unremakable. C/A/P with debris in the airway no bleeding. HDS off pressors.     Physical Exam:  Temp:  [97.5  F (36.4  C)-99.3  F (37.4  C)] 99.3  F (37.4  C)  Pulse:  [69-83] 74  Resp:  [12-25] 12  MAP:  [63 mmHg-116 mmHg] 70 mmHg  Arterial Line BP: ()/(33-77) 96/47  FiO2 (%):  [40 %-50 %] 40 %  SpO2:  [96 %-100 %] 99 %    Intake/Output Summary (Last 24 hours) at 9/15/2022 1309  Last data filed at 9/15/2022 1300  Gross per 24 hour   Intake 2815.27 ml   Output 1614 ml   Net 1201.27 ml    Vent Mode: CMV/AC  (Continuous Mandatory Ventilation/ Assist Control)  FiO2 (%): 40 %  Resp Rate (Set): 12 breaths/min  Tidal Volume (Set, mL): 450 mL  PEEP (cm H2O): 7 cmH2O  Resp: 12       Labs:  Recent Labs   Lab 09/15/22  1201 09/15/22  0952 09/15/22  0758 09/15/22  0559   PH 7.48* 7.49* 7.48* 7.48*   PCO2 38 36 35 34*   PO2 91 100 121* 154*   HCO3 28 27 26 25   O2PER 40 40 50 50      Recent Labs   Lab 09/15/22  0953 09/15/22  0348 22  2207 22  1559   WBC 21.9* 23.7* 24.5* 22.3*   HGB 9.0* 9.5* 9.9* 10.3*     Creatinine   Date Value Ref Range Status   09/15/2022 0.98 0.67 - 1.17 mg/dL Final   09/15/2022 0.96 0.67 - 1.17 mg/dL Final   2022 1.04 0.67 - 1.17 mg/dL Final   2022 1.17 0.67 - 1.17 mg/dL Final       Blood Flow (Circuit) LPM: 3.6 LPM  Gas Flow  LPM: 0.5 LPM  Gas FiO2   %: 50 %  ACT  (seconds): 182 seconds  Blood Temp  (degrees C): 36.9 C  Pulse Oximetry  (SpO2%): 99 %  Arterial Pressure  mmH mmHg      ECMO Issues including assessments and plan on DOS 9/15/2022:  Neuro: Sedated  for mechanical ventilation and ECMO and electrical storm was on fentanyl versed and propofol- discontinue versed.  No acute distress.  NIRS stable  b/l  RASS goal: 0-1  CV: Cardiogenic shock.  Hemodynamically stable off pressors, pulse pressure 40. Electrically stable today will stop lidocaine and stop versed. Plan for revascularization of remaining vessels tomorrow  Pulm: Keep vent settings at rest settings as above.  FEN/Renal: Electrolytes stable w/ replacement protocols in place, Cr stable, UOP stable  Heme: ACT goal: 180-200, Hemoglobin 9 with some oral bleeding ENT consulted .  Minimal oozing around the ECMO cannulas.  ID: Receiving empiric antibiotics  Cannulae: Position is acceptable on exam and the available imaging.  Distal perfusion cannula is in place and patent.  Extremities are well-perfused.     I have personally reviewed the ECMO flows, oxygenation and CO2 clearance, anticoagulation, and cannula position.  I have also personally assessed the patient's systemic response with hemodynamics, oxygenation, ventilation, and bleeding.       The patient requires continued ECMO support and management in the ICU.    Emily Lizama MD  Cardiology critical care  Pager

## 2022-09-15 NOTE — PROGRESS NOTES
ECMO Shift Summary: [id number]      ECMO Equipment:  Console serial number: [id number]  Circuit Lot number: [id number]  Oxygenator Lot number: [id number]    Patient remains on VA ECMO, all equipment is functioning and alarms are appropriately set. RPM's 3350 with flow range 3.58-3.7 L/min. Sweep gas is at 0.5 LPM and FiO2 50%. Circuit remains free of air, clot With fibrin at arterial and venous connectors. Cannulas are secure with no bleeding from site. Extremities are warm.     Significant Shift Events: Heparin increases t/o shift to get to therapeutic range. At 1800 u/hr with ACT hanging at 177    Vent settings:  Vent Mode: CMV/AC  (Continuous Mandatory Ventilation/ Assist Control)  FiO2 (%): 50 %  Resp Rate (Set): 12 breaths/min  Tidal Volume (Set, mL): 450 mL  PEEP (cm H2O): 7 cmH2O  Resp: 12  .    Heparin is running at 1800 u/hr, ACT range 177 now.    Urine output is as charted, blood loss from oral bleeding. No product given    Intake/Output Summary (Last 24 hours) at 9/15/2022 0405  Last data filed at 9/15/2022 0400  Gross per 24 hour   Intake 2776.94 ml   Output 1156 ml   Net 1620.94 ml       ECHO:  No results found for this or any previous visit.  No results found for this or any previous visit.      CXR:  Recent Results (from the past 24 hour(s))   Cardiac Catheterization    Narrative      Ost RCA lesion is 50% stenosed.    Dist RCA lesion is 60% stenosed.    RPDA lesion is 60% stenosed.    3rd Diag lesion is 99% stenosed.    1st Sept lesion is 50% stenosed.    1st Mrg lesion is 100% stenosed.    2nd Diag lesion is 40% stenosed.    2nd Mrg lesion is 40% stenosed.    1st LPL lesion is 30% stenosed.     1. Patent stents in LAD and LCx/OM, no new lesions noted on diagnostic   angiogram as compared to post PCI angiogram on 9/12/22.  2. Successful ECMO cannulation at left femoral vein and artery with distal   perfusion sheath.     XR Chest Port 1 View    Narrative    Portable chest 9/14/2022 at 0814  hours    INDICATION: Check endotracheal tube placement and ECLS cannula  placement.    COMPARISON: Earlier today 0102 hours    FINDINGS: Low inspiratory volumes secondary to portable AP technique.  Heart size remains borderline enlarged. Defibrillator paddle overlies  the lower chest midline.  Endotracheal tube tip is approximately 5.1 cm above the lexy. NG/OG  tube progresses beyond the inferior margin of the image. Inferior  approach ECMO catheter tip is somewhat obscured by the defibrillator  paddle but appears approximately at the SVC/right atrial junction.  Marker over the inferior margin of the expected aortic knob is  unchanged.  There is some silhouetting left hemidiaphragm an overall slightly  increased retrocardiac density unchanged from recent previous.      Impression    IMPRESSION: Endotracheal tube at upper thoracic trachea with ECMO  catheter tip from below near the SVC/right atrial junction. Continued  probable retrocardiac atelectasis, recommend follow-up to clearing to  exclude underlying infection.    ROSCOE SANCHEZ MD         SYSTEM ID:  [id number]   US Lower Extremity Arterial Duplex Bilateral    Narrative    ULTRASOUND LOWER EXTREMITY ARTERIAL DUPLEX BILATERAL  9/14/2022 9:08  AM    CLINICAL HISTORY: Baseline to assess blood flow to Lower Extremities  (VA ECMO).     COMPARISONS: None available.    REFERRING PROVIDER: JORGE REYES CASTRO    TECHNIQUE: Bilateral leg arteries evaluated with color Doppler and  Doppler waveform ultrasound    FINDINGS: Abnormal waveforms consistent with ECMO and aortic balloon  pump.    RIGHT:       Common femoral artery: OBSCURED       Profundus femoral artery: OBSCURED         Superficial femoral artery, origin: OBSCURED       Superficial femoral artery, proximal thigh: 49/0 cm/s       Superficial femoral artery, mid thigh: 72/35 cm/s       Superficial femoral artery, distal thigh: 54/0 cm/s         Popliteal artery: 75/41 cm/s         Posterior tibial artery,  ankle: 51/19 cm/s       Anterior tibial artery, ankle: 85/0 cm/s    LEFT:       Common femoral artery: OBSCURED       Profundus femoral artery: OBSCURED         Superficial femoral artery, origin: OBSCURED       Superficial femoral artery, mid thigh: OBSCURED       Superficial femoral artery, distal thigh: 48/0 cm/s         Popliteal artery: 44/0 cm/s         Posterior tibial artery, ankle: 43/0 cm/s       Anterior tibial artery, ankle: 48/0 cm/s      Impression    IMPRESSION: Dopplerable flow in bilateral anterior and posterior  tibial arteries at the ankles.    REID RIBERA MD         SYSTEM ID:  [id number]   Echo Limited   Result Value    Biplane LVEF 16%    Narrative    [id number]  [id number]  [id number]  [id number]^BRYNN SPENCER^RIGO^ЕКАТЕРИНА     [facility],[place]  Echocardiography Laboratory  [address] [place] [id number]     Name: BRUNNER, BRIAN D  MRN: [id number]  : 1960  Study Date: 2022 09:59 AM  Age: 61 yrs  Gender: Male  Patient Location: [facility]  Reason For Study: Cardiac Arrest  Ordering Physician: RIGO SOUTH  Performed By: Isaiah Orozco     BSA: 2.1 m2  Height: 67 in  Weight: 216 lb  HR: 76  BP: 98/45 mmHg  ______________________________________________________________________________  Procedure  Complete Portable Echo Adult. Contrast Optison. Optison (NDC #[id number])  given intravenously. Patient was given 5 ml mixture of 3 ml Optison and 6 ml  saline. 4 ml wasted.  ______________________________________________________________________________  Interpretation Summary  Biplane LVEF is 16%.  On VA ECMO at 3.8LPM.  LV apical thrombus noted.  Right ventricular function, chamber size, wall motion, and thickness are  normal.  ______________________________________________________________________________  Left Ventricle  Biplane LVEF is 16%. On VA ECMO at 3.8LPM.  LV apical thrombus noted.     Right Ventricle  Right ventricular function, chamber  size, wall motion, and thickness are  normal.     Pericardium  No pericardial effusion is present.     ______________________________________________________________________________  Report approved by: Amish Graves 09/14/2022 12:53 PM     ______________________________________________________________________________      CT Head w/o Contrast    Narrative    CT HEAD W/O CONTRAST 9/14/2022 2:50 PM    History: s/p cardiac arrest     Comparison: 9/12/2022    Technique: Using multidetector thin collimation helical acquisition  technique, axial, coronal and sagittal CT images from the skull base  to the vertex were obtained without intravenous contrast.   (topogram) image(s) also obtained and reviewed.    Findings: There is no intracranial hemorrhage, mass effect, or midline  shift. Gray/white matter differentiation in both cerebral hemispheres  is preserved. Ventricles are proportionate to the cerebral sulci. The  basal cisterns are clear.    The bony calvaria and the bones of the skull base are normal.  Hypoplastic frontal and sphenoid sinuses. Unchanged mild mucosal  thickening ethmoid and sphenoid sinuses. Mastoid air cells are clear.  Cisterna magna.      Impression    Impression:  No acute intracranial pathology.          I have personally reviewed the examination and initial interpretation  and I agree with the findings.    ASHLEY GONZALEZ MD         SYSTEM ID:  [id number]   CT Chest Abdomen Pelvis w/o Contrast    Narrative    EXAMINATION: CT CHEST ABDOMEN PELVIS W/O CONTRAST, 9/14/2022 2:50 PM    TECHNIQUE:  Helical CT images from the thoracic inlet through the  symphysis pubis were obtained without IV contrast.     COMPARISON: Chest abdomen pelvis CT 9/12/2022    HISTORY: s/p cardiac arrest    FINDINGS:  SUPPORT DEVICES:  Endotracheal tube within the mid thoracic trachea. Enteric tube  probably coiled in the stomach with tip near the pylorus/duodenal  bulb. Left common femoral arterial ECMO cannula with  tip in the  proximal left common iliac artery. Left common femoral venous ECMO  cannula with tip at the inferior cavoatrial junction. Right common  femoral central venous catheter with tip in the intrahepatic IVC.  Right common femoral arterial approach intra-aortic balloon pump with  inferior metallic marker at the level of the renal artery origins.  Superior metallic marker distal to the origin of the left subclavian  artery.    CHEST:  LUNGS: There is fluid within the trachea extending into the mainstem  bronchi with new multifocal occlusive plugging in the left lower lobar  bronchus. Stable small bilateral pleural effusions. Unchanged smooth  interlobular septal thickening with bibasilar dependent consolidative  opacities. Additional patchy groundglass opacities throughout the  lungs.    MEDIASTINUM: Heart size is within normal limits. Trace pericardial  effusion. Ascending aorta and main pulmonary artery diameters are  within normal limits. Coronary artery stents. Scattered mild  atherosclerotic calcifications of the aortic arch. Normal  configuration of the great vessels off of the aortic arch. No  suspicious mediastinal, hilar, or axillary lymph nodes.    Visualized thyroid and esophagus are unremarkable.    ABDOMEN/PELVIS:  LIVER: No suspicious focal hepatic lesion.    BILIARY: Intraluminal hyperdense fluid, likely the vicarious excretion  of contrast. No calcified intraluminal gallstone. No intrahepatic or  extrahepatic biliary ductal dilation.    PANCREAS: Mild peripancreatic mesenteric stranding with a few  scattered prominent lymph nodes about the celiac axis. No focal  pancreatic lesion. The main pancreatic duct is not dilated.    SPLEEN: Within normal limits.    ADRENAL GLANDS: No focal adrenal nodule.    URINARY TRACT: Several right parapelvic cyst. Small amount of  hyperdense contrast within bilateral renal collecting systems. No  hydronephrosis or hydroureter. No definitive renal stone.  Perinephric  stranding. Urinary bladder is decompressed with intraluminal contrast  and Owens catheter in place.    REPRODUCTIVE ORGANS: Prostatomegaly.    STOMACH: Within normal limits.    BOWEL: Normal caliber large and small bowel. No abnormal bowel wall  thickening or enhancement. Appendix is unremarkable.    PERITONEUM/FLUID: Scattered small volume free fluid extending into the  hepatorenal recess and left paracolic gutter.    VESSELS: Normal caliber of the infrarenal abdominal aorta.    BONES/SOFT TISSUES: Acute minimally displaced fractures of the  anterior left second through sixth ribs and right second through  seventh ribs. Benign bone island in the left acetabulum. No suspicious  sclerotic or lucent osseous lesion. Scattered degenerative changes of  the spine.      Impression    IMPRESSION:   1. Layering mucus in the trachea extending into the mainstem bronchi  with multiple occlusions of the left lower lobar bronchus.  2. Unchanged small bilateral pleural effusions with bibasilar  consolidative opacities possibly representing infection versus  atelectasis.  3. Smooth interlobular septal thickening with patchy groundglass  opacities throughout the lungs concerning for pulmonary edema.  4. New mild peripancreatic mesenteric stranding with a few scattered  prominent peripancreatic lymph nodes and fluid extending into the  hepatorenal space and left paracolic gutter which may be sequelae of  volume overload although acute pancreatitis is difficult to entirely  exclude. Consider further laboratory evaluation with lipase.  5. Unchanged appearance of the bilateral rib fractures related to CPR.  No pneumothorax.    I have personally reviewed the examination and initial interpretation  and I agree with the findings.    GRAY THIBODEAUX MD         SYSTEM ID:  [id number]       Labs:  Recent Labs   Lab 09/15/22  0155 09/15/22  0006 09/14/22  2210 09/14/22 1952   PH 7.45 7.41 7.42 7.43   PCO2 36 39 38 36   PO2 197*  124* 153* 165*   HCO3 25 25 25 24   O2PER 50 50 50 50       Lab Results   Component Value Date    HGB 9.9 (L) 09/14/2022    PHGB 50 (H) 09/14/2022     09/14/2022    FIBR 581 (H) 09/14/2022    INR 1.38 (H) 09/14/2022    PTT 74 (H) 09/14/2022    DD 2.65 (H) 09/14/2022         Plan is continue with VA ECMO support.      Josee Hernadez, RT  ECMO Specialist  9/15/2022 4:05 AM

## 2022-09-15 NOTE — PROGRESS NOTES
ECMO Shift Summary:      ECMO Equipment:  Console serial number: [id number]  Circuit Lot number: [id number]  Oxygenator Lot number: [id number]      Patient remains on VA ECMO, all equipment is functioning and alarms are appropriately set. RPM's 3350 with flow range 3.47-3.78 L/min. Sweep gas is at 0.5 LPM and FiO2 50%. Circuit remains free of air, clot and fibrin. Cannulas are secure with no bleeding from site. Extremities are perfused.     Significant Shift Events: None    Vent settings:  Vent Mode: CMV/AC  (Continuous Mandatory Ventilation/ Assist Control)  FiO2 (%): 40 %  Resp Rate (Set): 12 breaths/min  Tidal Volume (Set, mL): 450 mL  PEEP (cm H2O): 7 cmH2O  Resp: 13  .    Heparin is running at 1900 u/hr, ACT range 173-186.    Urine output is charted per RN, blood loss was minimal. Product given included none.       Intake/Output Summary (Last 24 hours) at 9/15/2022 1804  Last data filed at 9/15/2022 1800  Gross per 24 hour   Intake 2729.28 ml   Output 3044 ml   Net -314.72 ml       ECHO:  No results found for this or any previous visit.  No results found for this or any previous visit.      CXR:  Recent Results (from the past 24 hour(s))   XR Chest Port 1 View    Narrative    Exam: XR CHEST PORT 1 VIEW, 9/15/2022 1:15 AM    Indication: ETT, ECMO, and IABP positioning    Comparison: Chest x-ray 9/14/2022 at 8:18 AM. CT 9/14/2022    Findings:   ET tube tip projects over the midthoracic trachea. Enteric tube  courses past the level of the diaphragm with tip excluded from the  field-of-view. Inferior approach ECMO cannula tip at the high right  atrium. Intra-aortic balloon pump marker approximately 1 cm above the  level of the lexy.    Unchanged cardia mediastinal silhouette from previous radiograph. No  appreciable pneumothorax. Trace effusions better seen on previous CT.  Retrocardiac atelectasis/consolidation has increased. Coronary stent.      Impression    Impression:   1. ET tube tip projects over the  midthoracic trachea.  2. Balloon pump marker approximately 1 cm above the level of the  lexy.  3. Increasing retrocardiac atelectasis/consolidation.    I have personally reviewed the examination and initial interpretation  and I agree with the findings.    RENETTA STOREY MD         SYSTEM ID:  [id number]       Labs:  Recent Labs   Lab 09/15/22  1556 09/15/22  1401 09/15/22  1201 09/15/22  0952   PH 7.52* 7.49* 7.48* 7.49*   PCO2 37 38 38 36   PO2 90 103 91 100   HCO3 30* 29* 28 27   O2PER 40  50  40 40 40 40       Lab Results   Component Value Date    HGB 9.7 (L) 09/15/2022    PHGB 50 (H) 09/14/2022     09/15/2022    FIBR 806 (H) 09/15/2022    INR 1.44 (H) 09/15/2022     (HH) 09/15/2022    DD 2.31 (H) 09/15/2022    ANTCH 68 (L) 09/15/2022         Plan is to continue VA ecmo support.      Blanca Jones, RT  ECMO Specialist  9/15/2022 6:04 PM

## 2022-09-15 NOTE — PROGRESS NOTES
CLINICAL NUTRITION SERVICES - BRIEF NOTE   (See RD note on 9/13 for full assessment)     Reason for RD note: Provider order for Registered Dietitian to order TF per Medical Nutrition Therapy Guidelines    New Findings/Chart Review:  Nutrition support: Plan to start TF via OGT    Enteral Access: OGT    Respiratory: Intubated    GI: No documented BM since admission     Labs: Reviewed      Meds: Reviewed    Interventions:  -Adjusted nutrition needs now that pt height has been documented:  Dosing Weight: 79 kg (adjusted BW based on IBW and actual lowest BW of 98.5 kg)     ASSESSED NUTRITION NEEDS  Estimated Energy Needs: 9590-8320 kcals/day (25 - 30 kcals/kg)  Justification: Maintenance  Estimated Protein Needs: 160-200 grams protein/day (2 - 2.5 grams of pro/kg)  Justification: ECMO  Estimated Fluid Needs: 2400 mL/day (1 mL/kcal)   Justification: Maintenance    -Goal TF: TwoCal HN @ 40 mL/hr (960 mL/day) + 2 pkt Prosource QID to provide 2240 kcals (28 kcal/kg/day), 169 g PRO (2.1 g/kg/day), 672 mL H2O, 210 g CHO and 5 g Fiber daily.    --Initiate at 20 ml/hr and advance by 10 ml/hr q8h until goal is reached    --Do not adv until Mg++, K+ >/= WNL and phos >1.9.    --H2O flushes 30 ml q4hr for tube patency   --Certavite daily   --Continue 100 mg thiamine daily    Future/Additional Recommendations:  -Monitor TF tolerance/adequacy  -Monitor labs, stool output, weight trends     Nutrition will continue to follow per protocol.    Blanca Delarosa, MS, RD, LD  4E (CVICU) RD pager: [phone]  Ascom: [phone]  Weekend/Holiday RD pager: [phone]

## 2022-09-15 NOTE — PLAN OF CARE
Goal Outcome Evaluation:    Plan of Care Reviewed With: sibling     Overall Patient Progress: no change    Outcome Evaluation: No VT/VF this shift. Lidocaine gtt off at 0745. Versed off at 0930.    Major Shift Events: Lidocaine gtt off at 0745. Amiodarone gtt continues at 1 mg/hour. Versed gtt off (from 8 mg/hour) at 0930. Propofol and Fentanyl remain over this shift. VA ECMO and IABP continues without issue. No fluid or product given this shift. Heparin gtt continues at 1900 units/hour with ACT goal 180-200. ENT packed mouth with afrin gauze, see note. TF started at 1030 at 10 ml/hour, advanced to 20 at 1830 with plan to hold at midnight for cath lab tomorrow. Insulin gtt continues at 5 units/hour. IV lasix 60 mg given x2. Jo found with significant leaking at 1600 assessment requiring exchange. Nearly 2 liters in measurable output since the jo exchange at 1700. Potassium, magnesium, and iCal replaced. Currently net negative ~ 600 mls since midnight. BP notably softer over the last hour with MAP 60-65 from 70-80s.   Plan: Cath lab tomorrow; NPO at midnight. Possible ECMO turn-down study tomorrow.   For vital signs and complete assessments, please see documentation flowsheets.

## 2022-09-15 NOTE — CONSULTS
Electrophysiology Consultation Note   EP Attending: .   Reason for consultation: VT/VF.   Provider requesting consultation: Ms. TanaABHILASH [facility] ICU Service.  Date of Service: 9/15/2022      HPI:   Mr. Brunner is a 61 year old male who has an unknown past medical history. He presented to [facility] on 9/12/22 after suffering from an out of hospital cardiac arrest. 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 He was lowered to ground and EMS was called. He was found to be in VF and received an external defibrillation, epi, and amio. ECG showed ST elevation. He was taken emergently to cath lab and 100% occluded mLAD s/p thrombectomy and JAMES, 80% LCx lesion s/p JAMES, 100% occluded OM2 s/p JAMES. He was placed on IABP and underwent therapeutic hypothermia. He then went into VT/VF and required multiple external defibrillations (~15). He was placed on VA ECMO. He was placed on lidocaine and amio gtts. He had further VT/VF with another external defibrillation. He then had LSGB. An echo showed LVEF 15-20%, severe diffuse hypokinesis, akinesis of apical segments, basal-mid inferolateral segments, LV mural thrombus in apex. SCr 1.22, GFR 68, electrolytes stable. He remains intubated, sedated, on VA ECMO and critically ill.     Past Medical History:   History reviewed. No pertinent past medical history.  Past Surgical History:   Past Surgical History:   Procedure Laterality Date    CV ARTERIAL LINE PLACEMENT N/A 9/12/2022    Procedure: Arterial Line Placement;  Surgeon: Lul Porter MD;  Location: [facility] CARDIAC CATH LAB    CV CENTRAL VENOUS CATHETER PLACEMENT N/A 9/12/2022    Procedure: Central Venous Catheter Placement;  Surgeon: Lul Porter MD;  Location: [facility] CARDIAC CATH LAB    CV CORONARY ANGIOGRAM N/A 9/12/2022    Procedure: Coronary Angiogram;  Surgeon: Lul Porter MD;   "Location: [facility] CARDIAC CATH LAB    CV INTRA AORTIC BALLOON N/A 9/12/2022    Procedure: Intraprocedure Aortic Balloon Pump Insertion;  Surgeon: Lul Porter MD;  Location: [facility] CARDIAC CATH LAB    CV PCI ASPIRATION THROMECTOMY N/A 9/12/2022    Procedure: Percutaneous Coronary Intervention Aspiration Thrombectomy;  Surgeon: Lul Porter MD;  Location: [facility] CARDIAC CATH LAB    CV PCI STENT DRUG ELUTING N/A 9/12/2022    Procedure: Percutaneous Coronary Intervention Stent;  Surgeon: Lul Porter MD;  Location: [facility] CARDIAC CATH LAB     Allergies: Per MAR   Not on File  Medications:   Per MAR current outpatient cardiovascular medications include:   No medications prior to admission.     Current Outpatient Medications   Medication Sig Dispense Refill    aspirin (ASA) 81 MG EC tablet Take 1 tablet (81 mg) by mouth daily Start tomorrow. 30 tablet 3     Current Facility-Administered Medications   Medication Dose Route Frequency    aspirin  81 mg Per Feeding Tube Daily    magnesium sulfate  2 g Intravenous Once    multivitamins w/minerals  15 mL Per Feeding Tube Daily    oxymetazoline  2 spray Topical Q4H    pantoprazole  40 mg Oral QAM AC    piperacillin-tazobactam  3.375 g Intravenous Q6H    polyethylene glycol  17 g Oral Daily    protein modular  2 packet Per Feeding Tube 4x Daily    senna-docusate  2 tablet Oral At Bedtime    thiamine  100 mg Oral Daily    ticagrelor  90 mg Oral or Feeding Tube BID     Family History:   History reviewed. No pertinent family history.  Social History:   Social History     Tobacco Use    Smoking status: Not on file    Smokeless tobacco: Not on file   Substance Use Topics    Alcohol use: Not on file       ROS:   A comprehensive 10 point ROS was negative other than as mentioned in HPI.    Physical Examination:   VITALS: /61   Pulse 82   Temp 99.5  F (37.5  C)   Resp 13   Ht 1.702 m (5' 7\")   Wt 102.5 kg (225 lb " 15.5 oz)   SpO2 100%   BMI 35.39 kg/m    GENERAL APPEARANCE: Intubated, sedated, critically ill.  HEENT: MMM. PERRLA.   NECK: Supple.   CHEST: CTAB   CARDIOVASCULAR: Reg, IABP sounds, on ECMO   ABDOMEN: BS, ND.   EXTREMITIES: 1+ BLE edema.  NEURO: sedated.   PSYCH: sedated.  SKIN: Warm and dry.   Data:   Labs:  BMP  Recent Labs   Lab 09/15/22  1556 09/15/22  1407 09/15/22  1204 09/15/22  1003 09/15/22  0953 09/15/22  0350 09/15/22  0348 22  2210 22  2207 22  1604 22  1559   NA  --   --   --   --  139  --  140  --  140  --  140   POTASSIUM  --   --   --   --  4.1  --  4.3  --  4.4  --  4.9   CHLORIDE  --   --   --   --  108*  --  108*  --  108*  --  107   JACKIE  --   --   --   --  7.9*  --  8.0*  --  8.2*  --  8.5*   CO2  --   --   --   --  26  --  24  --  25  --  22   BUN  --   --   --   --  16.2  --  17.0  --  17.0  --  18.3   CR  --   --   --   --  0.98  --  0.96  --  1.04  --  1.17   * 128* 141* 112* 111*   < > 124*   < > 146*   < > 143*  127*    < > = values in this interval not displayed.     CBC  Recent Labs   Lab 09/15/22  1556 09/15/22  0953 09/15/22  0348 22  2207   WBC 27.2* 21.9* 23.7* 24.5*   RBC 3.52* 3.21* 3.45* 3.56*   HGB 9.7* 9.0* 9.5* 9.9*   HCT 30.1* 27.5* 29.7* 30.3*   MCV 86 86 86 85   MCH 27.6 28.0 27.5 27.8   MCHC 32.2 32.7 32.0 32.7   RDW 14.1 14.2 14.1 13.9    179 203 193     INR  Recent Labs   Lab 09/15/22  1556 09/15/22  0953 09/15/22  0349 22  2207   INR 1.44* 1.50* 1.42* 1.38*     No results found for: CKTOTAL, CKMB, TROPN  No results found for: CHOL, CHOLHDLRATIO, HDL, LDL  EK/15/22 ECHO:   Interpretation Summary  Biplane LVEF is 16%.  On VA ECMO at 3.8LPM.  LV apical thrombus noted.  Right ventricular function, chamber size, wall motion, and thickness are  normal.    Assessment:   Mr. Brunner is a 61 year old male who has an unknown past medical history. He presented to [facility] on 22 after suffering from an out of hospital  cardiac arrest. 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 He was lowered to ground and EMS was called. He was found to be in VF and received an external defibrillation, epi, and amio. ECG showed ST elevation. He was taken emergently to cath lab and 100% occluded mLAD s/p thrombectomy and JAMES, 80% LCx lesion s/p JAMES, 100% occluded OM2 s/p JAMES. He was placed on IABP and underwent therapeutic hypothermia. He then went into VT/VF and required multiple external defibrillations (~15). He was placed on VA ECMO. He was placed on lidocaine and amio gtts. He had further VT/VF with another external defibrillation. He then had LSGB. An echo showed LVEF 15-20%, severe diffuse hypokinesis, akinesis of apical segments, basal-mid inferolateral segments, LV mural thrombus in apex. SCr 1.22, GFR 68, electrolytes stable. He remains intubated, sedated, on VA ECMO and critically ill.     EP Recommendations:  Out of Hospital Cardiac Arrest, STEMI s/p PCI ICM LVEF 15-20%, NYHA III with recurrent VT/VF:  1. ACEi/ARB: start when able.  2. BB: not currently on d/t cardiogenic shock   3. SCD prophylaxis: had VF in setting of STEMI and then recurrent VT/VF post revascularization within 48 hours. Would need to consider ICD if further VT/VF episodes >48 hours after revascularization or if LVEF remains low despite revascularization and GDMT.   4. VT/VF: Agree with amiodarone. Would wean lidocaine. Agree with considering revascularization of remaining vessels.Having ischemic fascicular system triggering his ventricular tachyarrhythmias after late presenting STEMI. If he continues to have refractory arrhythmias then would need to consider ablation.       Thank you for allowing us to participate in the care of this patient.     The patient was discussed w/ Dr. Borges.  The above note reflects our joint plan.    VLADIMIR Godoy CNP  Electrophysiology Consult  Service  Pager: [phone]  JORJE Total time spent on patient visit, reviewing notes, imaging, labs, orders, and completing necessary documentation: 60 minutes.  >50% of visit spent on counseling patient and/or coordination of care.    EP STAFF NOTE  I have seen and examined the patient as part of a shared visit with COMPA Godoy NP.  I agree with the note above. I reviewed today's vital signs and medications. I have reviewed and discussed with the advanced practice provider their physical examination, assessment, and plan   Briefly, ICM, late presentation LAD STEMI, VF after revascularization, residual significant CAD of smaller vessels.  My key history/exam findings are: intubated, sedated  The key management decisions made by me: amio, can wean off lido, can try further PCI of smaller vessel, VF ablation if recurrence.  Total time spent on patient visit, reviewing notes, imaging, labs, orders, and completing necessary documentation: 30 minutes.  >50% of visit spent on counseling patient and/or coordination of care.     Dipak Borges MD [facility]  Cardiology - Electrophysiology

## 2022-09-16 NOTE — CONSULTS
Dental Service Consultation      Brian D Brunner MRN# [id number]  YOB: 1960 Age: 61 year old  Date of Admission: 9/12/2022    Reason for consult: I was asked by Shelli Fajardo (nurse) to evaluate this patient for possible tooth aspiration risk.       Assessment and Plan:  Assessment:   Extraoral exam: NC/AT, EOMI, PERRL, No submandibular lymphadenopathy, no induration or erythema, no abnormal skin lesions, inferior border of mandible is palpable bilaterally, RAMANA >45mm.      Limited intraoral exam (patient intubated): Reveals very poor dentition. Severe bone loss and mobility on multiple teeth. Mallampati II. RAMANA ~45mm.      Plan & Procedure:  - Removed tooth #22 bedside (using fingers only) due to extreme bone loss and mobility with risk of aspiration.   - Additional extractions upon further imaging and examination at the clinic if deemed necessary at the [facility] once the patient is stable enough to transfer. If the patient is not stable enough to transfer coordination with our scheduling team may be necessary to see patient in the operating room for tooth extractions.   - Physician of the patient has been notified of the procedure on how to make an appointment for the patient (CALL : [phone] and make the arrangements for moving the patient to the clinic or to coordinate operating room scheduling).     Clinic information:   [facility]  [address] [place] [id number]  [phone]    History is obtained from the patient    History of Present Illness:  This patient is a 61 year old male admitted to [facility] on 9/12/22 after suffering from an out of hospital cardiac arrest.     Past Medical History:  History reviewed. No pertinent past medical history.    Past Surgical History:  Past Surgical History:   Procedure Laterality Date    CV ARTERIAL LINE PLACEMENT N/A 9/12/2022    Procedure: Arterial Line Placement;  Surgeon: Lul Porter  MD Vijay;  Location: [facility] CARDIAC CATH LAB    CV CENTRAL VENOUS CATHETER PLACEMENT N/A 9/12/2022    Procedure: Central Venous Catheter Placement;  Surgeon: Lul Porter MD;  Location: [facility] CARDIAC CATH LAB    CV CORONARY ANGIOGRAM N/A 9/12/2022    Procedure: Coronary Angiogram;  Surgeon: Lul Porter MD;  Location: [facility] CARDIAC CATH LAB    CV INTRA AORTIC BALLOON N/A 9/12/2022    Procedure: Intraprocedure Aortic Balloon Pump Insertion;  Surgeon: Lul Porter MD;  Location: [facility] CARDIAC CATH LAB    CV PCI ASPIRATION THROMECTOMY N/A 9/12/2022    Procedure: Percutaneous Coronary Intervention Aspiration Thrombectomy;  Surgeon: Lul Porter MD;  Location: [facility] CARDIAC CATH LAB    CV PCI STENT DRUG ELUTING N/A 9/12/2022    Procedure: Percutaneous Coronary Intervention Stent;  Surgeon: Lul Porter MD;  Location: [facility] CARDIAC CATH LAB       Social History:  Social History     Tobacco Use    Smoking status: Not on file    Smokeless tobacco: Not on file   Substance Use Topics    Alcohol use: Not on file       Family History:  History reviewed. No pertinent family history.    Immunizations:    There is no immunization history on file for this patient.    Allergies:  Not on File    Medications:  Current Facility-Administered Medications Ordered in Epic   Medication Dose Route Frequency Last Rate Last Admin    amiodarone (CORDARONE) infusion ADULT MAX concentration (6 mg/mL)  1 mg/min Intravenous Continuous 10 mL/hr at 09/15/22 2200 1 mg/min at 09/15/22 2200    artificial tears ophthalmic ointment   Both Eyes Q8H PRN        aspirin (ASA) chewable tablet 81 mg  81 mg Per Feeding Tube Daily   81 mg at 09/15/22 0817    calcium chloride in  mL intermittent infusion 1 g  1 g Intravenous Q6H PRN   1 g at 09/15/22 1733    dextrose 10% infusion   Intravenous Continuous PRN        dextrose 10% infusion    Intravenous Continuous PRN        glucose gel 15-30 g  15-30 g Oral Q15 Min PRN        Or    dextrose 50 % injection 25-50 mL  25-50 mL Intravenous Q15 Min PRN        Or    glucagon injection 1 mg  1 mg Subcutaneous Q15 Min PRN        fentaNYL (SUBLIMAZE) 50 mcg/mL bolus from pump  50 mcg Intravenous Q30 Min PRN   50 mcg at 09/14/22 0245    fentaNYL (SUBLIMAZE) infusion   mcg/hr Intravenous Continuous 2 mL/hr at 09/15/22 2200 100 mcg/hr at 09/15/22 2200    heparin 2 unit/mL in 0.9% NaCl (for REPERFUSION CATHETER)  3 mL/hr Intravenous Continuous 3 mL/hr at 09/15/22 2200 3 mL/hr at 09/15/22 2200    heparin ANTICOAGULANT bolus dose from infusion pump 330.5-661 Units  5-10 Units/kg (Ideal) Other Q30 Min PRN   1,000 Units at 09/15/22 2000    heparin infusion 25,000 units in D5W 250 mL ANTICOAGULANT  10-50 Units/kg/hr (Ideal) Other Continuous 19 mL/hr at 09/15/22 2200 28.744 Units/kg/hr at 09/15/22 2200    hydrALAZINE (APRESOLINE) injection 10 mg  10 mg Intravenous Q4H PRN        insulin 1 unit/mL in saline (NovoLIN, HumuLIN Regular) drip - ADULT IV Infusion  0-24 Units/hr Intravenous Continuous 5 mL/hr at 09/15/22 2200 5 Units/hr at 09/15/22 2200    lidocaine (LMX4) cream   Topical Q1H PRN        lidocaine (LMX4) cream   Topical Q1H PRN        lidocaine 1 % 0.1-1 mL  0.1-1 mL Other Q1H PRN        lidocaine 1 % 0.1-1 mL  0.1-1 mL Other Q1H PRN        magnesium sulfate 2 g in water intermittent infusion  2 g Intravenous Daily PRN        magnesium sulfate 4 g in 100 mL sterile water (premade)  4 g Intravenous Q4H PRN        metoprolol (LOPRESSOR) injection 5 mg  5 mg Intravenous Q15 Min PRN        midazolam (VERSED) injection 0.5 mg  0.5 mg Intravenous Q5 Min PRN   10 mg at 09/14/22 0500    midazolam (VERSED) injection 1-3 mg  1-3 mg Intravenous Q1H PRN   2 mg at 09/14/22 0200    multivitamins w/minerals liquid 15 mL  15 mL Per Feeding Tube Daily   15 mL at 09/15/22 1039    nitroGLYcerin (NITROSTAT) sublingual tablet  0.4 mg  0.4 mg Sublingual Q5 Min PRN        ondansetron (ZOFRAN ODT) ODT tab 4 mg  4 mg Oral Q6H PRN        Or    ondansetron (ZOFRAN) injection 4 mg  4 mg Intravenous Q6H PRN        oxyCODONE (ROXICODONE) tablet 5 mg  5 mg Oral Q4H PRN        Or    oxyCODONE IR (ROXICODONE) tablet 10 mg  10 mg Oral Q4H PRN        oxymetazoline (AFRIN) 0.05 % spray 2 spray  2 spray Topical Q4H   2 spray at 09/15/22 2006    pantoprazole (PROTONIX) 2 mg/mL suspension 40 mg  40 mg Oral QAM AC   40 mg at 09/15/22 0818    Percutaneous Coronary Intervention orders placed (this is information for BPA alerting)   Does not apply DOES NOT GO TO MAR        piperacillin-tazobactam (ZOSYN) 3.375 g vial to attach to  mL bag  3.375 g Intravenous Q6H 200 mL/hr at 09/14/22 2325 3.375 g at 09/15/22 1819    polyethylene glycol (MIRALAX) Packet 17 g  17 g Oral Daily   17 g at 09/15/22 0818    propofol (DIPRIVAN) infusion  5-75 mcg/kg/min (Dosing Weight) Intravenous Continuous 5.9 mL/hr at 09/15/22 2200 10 mcg/kg/min at 09/15/22 2200    protein modular (PROSOURCE TF) 2 packet  2 packet Per Feeding Tube 4x Daily   2 packet at 09/15/22 2006    Reason beta blocker order not selected   Does not apply DOES NOT GO TO MAR        Reason statin not prescribed   Other DOES NOT GO TO MAR        senna-docusate (SENOKOT-S/PERICOLACE) 8.6-50 MG per tablet 2 tablet  2 tablet Oral At Bedtime   2 tablet at 09/15/22 2204    sodium chloride (PF) 0.9% PF flush 3 mL  3 mL Intracatheter q1 min prn        sodium chloride (PF) 0.9% PF flush 3 mL  3 mL Intracatheter q1 min prn        sodium phosphate 10 mmol in D5W 250 mL intermittent infusion  10 mmol Intravenous Daily PRN        sodium phosphate 15 mmol in D5W 250 mL intermittent infusion  15 mmol Intravenous Daily PRN        sodium phosphate 20 mmol in D5W 250 mL intermittent infusion  20 mmol Intravenous Q6H PRN        sodium phosphate 25 mmol in D5W 250 mL intermittent infusion  25 mmol Intravenous Q8H PRN         thiamine (B-1) tablet 100 mg  100 mg Oral Daily   100 mg at 09/15/22 0817    ticagrelor (BRILINTA) tablet 90 mg  90 mg Oral or Feeding Tube BID   90 mg at 09/15/22 2031     Current Outpatient Medications Ordered in Epic   Medication    aspirin (ASA) 81 MG EC tablet       Review of Systems:  The 10 point Review of Systems is negative other than noted in the HPI    Physical Exam:  Vitals were reviewed  Temp: 99  F (37.2  C) Temp src: Esophageal   Pulse: 73   Resp: 13 SpO2: 99 % O2 Device: Mechanical Ventilator        Head and neck exam:  HEENT: NC/AT, EOMI, PERRL, No submandibular lymphadenopathy, no induration or erythema, no abnormal skin lesions, inferior border of mandible is palpable bilaterally, RAMANA >45mm.     The patient was discussed with nurses Shelli Fajardo and Dannielle Harris.     Attestation:  I, Jerome Montelongo DDS, discussed the patient with the resident immediately after the consultation on September 14, 2022, and agree with the resident s assessment and plan of care.  I was immediately available to the patient should the need have arisen.    Dr. Iglesias, DMD  PGY1  Pager: [phone]

## 2022-09-16 NOTE — PLAN OF CARE
TF turned off at 0000 for CL procedure today per orders. Insulin gtt held, restarted with sugars up to 196 with TF off for 4hrs. Will continue to spot check glucose w/ECMO ABGs.  Vent fiO2 increased f/40 to 50% per pO2 77 x2 on ABG, no other vent changes, sputum culture collected. Remains off pressors, no ectopy  noted overnight, no concerns w/IABP. Pulses palpable and dopplerable throughout. UOP 55-80ml/hr, replaced K and Ca+ per protocol. Will continue to update team with any changes in condition per protocol.

## 2022-09-16 NOTE — PROGRESS NOTES
[facility], Procedure Note           Intra-Aortic Balloon Pump Discontinuation:       Brian D Brunner  MRN# [id number]   September 16, 2022, 2:20 PM             IABP discontinued: September 16, 2022, 11:20 PM   Removed by: Dr Porter   Catheter saved: No      Recorded by Kirk Jolley, RT

## 2022-09-16 NOTE — PROGRESS NOTES
"[facility] [place]  NeuroCritical Care Progress Note    Patient Name:  Brian D Brunner  MRN:  [id number]    :  1960  Date of Service:  2022  Primary care provider:  No primary care provider on file.      A/P:   Brian D Brunner is a 61 year old male with a unknown past medical history of admitted on 2022 for Cardiac arrest.  pt was being warmed, rearrested and was cannulated for VA ECMO. NCC consulted for concerns for seizures.     Sedation weaned down. Fent @100, Prop @10 and Versed stopped morning of 9/15. This AM team notified that myoclonus had returned. Contacted epileptologist who deemed pt to be in status myoclonus after reviewing EEG. Keppra load given in cath lab and was given with BID dosing after. Versed gtt resumed initially. Decision was made to use propofol gtt only shortly after and versed was discontinued.     This afternoon when pt returned from angio, EEG was reattached. EEG was flat in burst suppression. Pupils reactive on pupillometry. Propofol stopped. Will continue to monitor EEG        NEURO RECS  - Continue vEEG   - Keppra load 4g. Then 1g BID  - Propofol gtt start @30 set rate, no titration  - We will continue to follow and monitor their EEG and neurologic exam, please call #[phone] with any questions      Physical Examination:   /61   Pulse 77   Temp 98.6  F (37  C) (Bladder)   Resp 12   Ht 1.702 m (5' 7\")   Wt 102.5 kg (225 lb 15.5 oz)   SpO2 100%   BMI 35.39 kg/m    General: Adult male patient, lying in bed, critically-ill  HEENT: Normocephalic, atraumatic, no icterus, oral cavity/oropharynx pink and moist, ETT  Cardiac: Tele, ECMO   Pulm: CTAB, unlabored, expansion symmetric, no retractions or use of accessory muscles, VENT  Abdomen: Soft, round, bowel sounds present  Extremities: cool, generalized edema, distal pulses +2, well perfused  Skin: No rash or lesion  Psych: sedated  Neuro:  Pt is deeply sedated. Pupils equal and " reactive on pupillometry. Pt does not open eyes or follow commands. -cough/gag. No response to noxious stim.     I have personally reviewed all labs and imaging for this patient.      Patient discussed with attending neurologist, Dr. Letitia Muhammad, CHRISTINE  Ascom: *[phone]

## 2022-09-16 NOTE — PROGRESS NOTES
ECMO Attending Progress Note  2022    Brian D Brunner is a 61 year old male who was cannulated for ECMO  due to VT storm after he presented with OHCA with VT and 2 shocks with ROSC       Cannulation Site:  17 Fr in the L femoral artery  25 Fr in the L femoral vein  8Fr distal reperfusion cannula    Interval events: no further ectopy off lidocaine, versed off since yesterday am. Fio 2 increased slightly labs stable/improving    Physical Exam:  Temp:  [98.4  F (36.9  C)-99.5  F (37.5  C)] 98.4  F (36.9  C)  Pulse:  [64-83] 66  Resp:  [1-17] 1  MAP:  [60 mmHg-103 mmHg] 80 mmHg  Arterial Line BP: ()/(34-68) 109/54  FiO2 (%):  [40 %-50 %] 50 %  SpO2:  [95 %-100 %] 100 %    Intake/Output Summary (Last 24 hours) at 2022 0807  Last data filed at 2022 0800  Gross per 24 hour   Intake 3048.28 ml   Output 3868 ml   Net -819.72 ml    Vent Mode: CMV/AC  (Continuous Mandatory Ventilation/ Assist Control)  FiO2 (%): 50 %  Resp Rate (Set): 12 breaths/min  Tidal Volume (Set, mL): 450 mL  PEEP (cm H2O): 7 cmH2O  Resp: (!) 1       Labs:  Recent Labs   Lab 22  0559 22  0353 22  0343 22  0155 09/15/22  2342   PH 7.54*  --  7.53* 7.52* 7.53*   PCO2 38  --  37 37 37   PO2 72*  --  77* 77* 80   HCO3 32*  --  31* 31* 31*   O2PER 50 40  50 40 40 40      Recent Labs   Lab 22  0351 09/15/22  2210 09/15/22  1556 09/15/22  0953   WBC 22.8* 25.3* 27.2* 21.9*   HGB 8.3* 9.0* 9.7* 9.0*     Creatinine   Date Value Ref Range Status   2022 1.16 0.67 - 1.17 mg/dL Final   09/15/2022 1.25 (H) 0.67 - 1.17 mg/dL Final   09/15/2022 1.22 (H) 0.67 - 1.17 mg/dL Final   09/15/2022 0.98 0.67 - 1.17 mg/dL Final       Blood Flow (Circuit) LPM: 3.72 LPM  Gas Flow  LPM: 0.5 LPM  Gas FiO2   %: 50 %  ACT  (seconds): 186 seconds  Blood Temp  (degrees C): 36.7 C  Pulse Oximetry  (SpO2%): 97 %  Arterial Pressure  mmH mmHg      ECMO Issues including assessments and plan on DOS 2022:  Neuro:  Sedated for mechanical ventilation and ECMO and electrical storm was on fentanyl versed and propofol- discontinued versed 9/15- some jerking movts today with confirmed status myoclonus loaded with keppra and back on versed with burst suppression stopped versed plan to use propofol for breakthrough if required as we increase keppra.  No acute distress.  NIRS stable  b/l  RASS goal: 0-1  CV: Cardiogenic shock.  Hemodynamically stable off pressors, pulse pressure 50. Electrically stable. Plan for revascularization of oRCA, LPDA and OM1. Decrease amio ggt to 0.5  Pulm: Keep vent settings at rest settings as above.  FEN/Renal: Electrolytes stable w/ replacement protocols in place, Cr stable, UOP stable  Heme: ACT goal: 180-200, Hemoglobin 8.3 with some oral bleeding ENT consulted.  Minimal oozing around the ECMO cannulas.  ID: Receiving empiric antibiotics- today will complete 5d  Cannulae: Position is acceptable on exam and the available imaging.  Distal perfusion cannula is in place and patent.  Extremities are well-perfused.     I have personally reviewed the ECMO flows, oxygenation and CO2 clearance, anticoagulation, and cannula position.  I have also personally assessed the patient's systemic response with hemodynamics, oxygenation, ventilation, and bleeding.       The patient requires continued ECMO support and management in the ICU.     Emily Lizama MD  Cardiology critical care  Pager

## 2022-09-16 NOTE — PROGRESS NOTES
ECMO Shift Summary: 1900-0700      ECMO Equipment:  Console serial number: [id number]  Circuit Lot number: [id number]  Oxygenator Lot number: [id number]    Patient remains on VA ECMO, all equipment is functioning and alarms are appropriately set. RPM's 3350 with flow range 3.7-3.8 L/min. Sweep gas is at 0.5 LPM and FiO2 50%. Circuit remains free of air, clot , some  Fibrin formation at connectors. Cannulas are secure with no bleeding from site. Extremities are warm. Suctioned ETT for small amt of thick tan secretions.    Significant Shift Events: none    Vent settings:  Vent Mode: CMV/AC  (Continuous Mandatory Ventilation/ Assist Control)  FiO2 (%): 40 %  Resp Rate (Set): 12 breaths/min  Tidal Volume (Set, mL): 450 mL  PEEP (cm H2O): 7 cmH2O  Resp: 12  .    Heparin is running at 2000 u/hr, ACT range 165-194.    Urine output is as charted, blood loss was minimal. No product given.       Intake/Output Summary (Last 24 hours) at 9/16/2022 0436  Last data filed at 9/16/2022 0400  Gross per 24 hour   Intake 3138.41 ml   Output 3868 ml   Net -729.59 ml       ECHO:  No results found for this or any previous visit.  No results found for this or any previous visit.      CXR:  No results found for this or any previous visit (from the past 24 hour(s)).    Labs:  Recent Labs   Lab 09/16/22  0353 09/16/22  0343 09/16/22  0155 09/15/22  2342 09/15/22  2207   PH  --  7.53* 7.52* 7.53* 7.50*   PCO2  --  37 37 37 40   PO2  --  77* 77* 80 104   HCO3  --  31* 31* 31* 32*   O2PER 40  50 40 40 40 40       Lab Results   Component Value Date    HGB 8.3 (L) 09/16/2022    PHGB <30 09/15/2022     09/16/2022    FIBR 806 (H) 09/15/2022    INR 1.47 (H) 09/15/2022     (HH) 09/15/2022    DD 2.31 (H) 09/15/2022    ANTCH 68 (L) 09/15/2022         Plan is Continue with VA ecmo support.      Josee Hernadez, RT  ECMO Specialist  9/16/2022 4:36 AM

## 2022-09-16 NOTE — PROGRESS NOTES
ECMO Shift Summary:1710-0146      ECMO Equipment:  Console serial number: [id number]  Circuit Lot number: [id number]  Oxygenator Lot number: [id number]    Patient remains on VA ECMO, all equipment is functioning and alarms are appropriately set. RPM's 3450 with flow range 3.5-3.7 L/min. Sweep gas is at 0.5 LPM and FiO2 70%. Circuit remains free of air, clot and fibrin. Cannulas are secure with no bleeding from site. Extremities are warm and edematous. Suctioned ETT for secretions.    Significant Shift Events: Traveled to [facility] for stent placement, pulled first IABP and replaced after procedure. Patient is found to be in status according to Neuro.    Vent settings:  Vent Mode: CMV/AC  (Continuous Mandatory Ventilation/ Assist Control)  FiO2 (%): 50 %  Resp Rate (Set): 12 breaths/min  Tidal Volume (Set, mL): 400 mL  PEEP (cm H2O): 7 cmH2O  Resp: (!) 32  .    Heparin is running at 1200 u/hr, ACT range 186-280.    Urine output is as charted, blood loss was oozing. Product given included none.       Intake/Output Summary (Last 24 hours) at 9/16/2022 1743  Last data filed at 9/16/2022 1700  Gross per 24 hour   Intake 2939.05 ml   Output 4373 ml   Net -1433.95 ml       ECHO:  No results found for this or any previous visit.  No results found for this or any previous visit.      CXR:  Recent Results (from the past 24 hour(s))   XR Chest Port 1 View    Narrative    Exam: XR CHEST PORT 1 VIEW, 9/16/2022 1:15 AM    Indication: ETT, ECMO, and IABP positioning    Comparison: Chest x-ray 9/15/2022    Findings:   ET tube tip projected over the mid to low thoracic trachea.  Intra-aortic balloon pump marker approximately 1 cm above the level of  the lexy, unchanged. External defibrillator pad. Inferior approach  ECMO cannula with tip projecting over the high right atrium.    Unchanged widened cardiomediastinal silhouette and dense left  retrocardiac opacity. Coronary artery stents. No pneumothorax or  effusions.      Impression     Impression:   1. ET tube tip over the mid to low thoracic trachea.  2. Intra-aortic balloon pump marker roughly 1 cm above the level of  the lexy.  3. ECMO cannula tip at the high right atrium.  4. Unchanged cardiomegaly and left retrocardiac  atelectasis/consolidation.    I have personally reviewed the examination and initial interpretation  and I agree with the findings.    TATY CLINTON DO         SYSTEM ID:  [id number]       Labs:  Recent Labs   Lab 09/16/22  1615 09/16/22  1415 09/16/22  1243 09/16/22  1113 09/16/22  0945   PH  --  7.61* 7.58* 7.38 7.52*   PCO2  --  33* 36 58* 40   PO2  --  116* 69* 265* 107*   HCO3  --  33* 34* 35* 33*   O2PER 50  70 50 50.0 60.0 50       Lab Results   Component Value Date    HGB 8.2 (L) 09/16/2022    PHGB <30 09/16/2022     09/16/2022    FIBR 804 (H) 09/16/2022    INR 1.44 (H) 09/16/2022     (HH) 09/16/2022    DD 1.93 (H) 09/16/2022    ANTCH 66 (L) 09/16/2022         Plan is to continue to support.      Camila Nassar, RT  ECMO Specialist  9/16/2022 5:43 PM

## 2022-09-16 NOTE — PROGRESS NOTES
[facility], Procedure Note          Intra-Aortic Balloon Pump Insertion:       Brian D Brunner  MRN# [id number]   September 16, 2022, 2:17 PM Indication: Cardiogenic shock           Procedure performed: September 16, 2022, 1:20 PM   Location: [facility]   Catheter size: 50 ml, 8 fr   Inserted: 6 inch introducer sheath   Catheter placed: Right femoral artery   Complications:: None   Assist initiated: 1:1 ratio   Percent augmentation: 100   Timing adjusted: Adjusted to achieve optimal assist   Verification of position: Fluoroscopy  Confirmed   Comments: Original IABP pulled @ 1120; this IABP replaced it      Recorded by Kirk Jolley, RT

## 2022-09-16 NOTE — PLAN OF CARE
Goal Outcome Evaluation:  Pt began having rhythmic extremity twitching greater in LLE at onset at bout 0920.  EEG time stamp button pressed and observed symptoms reported.  Neuro Critical Care aware and diagnosed patient with Status.  Versed restarted and bolus 4mg given in cath lab-pt is presently in cath lab for additional PCI intervention.  Patient to get Keppra load while in cath lab once medication available from pharmacy.  Per Neuro Critical Care no CT needed at this time.     Patients maría elenaer Aaron updated by phone.  Brother Joey at bedside and updated; Joey is not patients' decision maker.

## 2022-09-17 NOTE — PLAN OF CARE
Major Shift Events:  pt remains intubated on CMV mode, 50% FiO2, PEEP 7. NSR with HR 60-70 throughout the day. Amio drip turned off and transitioned to PO at 1100. IABP remains in place at 1:1. Still on VA ECMO. Keppra dose increased to 1.5g BID. vEEG in place. Propofol remains running at 30. Pupillometry unremarkable with pupils equal/reactive. No further seizure activity. RASS -5. 40mg Lasix given, UO steady at >80mL/hr throughout shift. R fem venous sheath removed. K+ replaced x2. Fentanyl running at 100mcg/hr for comfort. Insulin gtt titrated within algorithm 4 to maintain BG within ordered parameters. Continued oozing/bleeding from mouth, no new orders from ENT. Afrin topical spray administered Q4H to oral mucosa.    Plan: Wean down propofol tomorrow, continue monitoring for seizure activity, wean down ECMO tomorrow    For vital signs and complete assessments, please see documentation flowsheets.

## 2022-09-17 NOTE — PROGRESS NOTES
[facility]   Critical Care Cardiology - Progress Note    Brian D Brunner MRN: [id number]  Age: 61 year old, : 1960  Admission Date: 2022    Assessment and Plan:  Brian Brunner is a 61 year old male with unknown PMHx who was admitted on 2022 following an OOHCA.      In brief review, pt was found by a co-worker to be hanging from a forklift unresponsive. Patient was lowered to the ground, CPR started, 911 was called, AED was placed and patient received 1 shock, along with 2 mg of epinephrine and 300 mg of Amiodarone with ROSC. EKG showed ST elevation in the later leads. Patient was brought to  [facility] and taken directly to [facility] where he had a coronary angiogram revealing 100% occluded mLAD s/p thrombectomy and JAMES, 80% LCx lesion s/p JAMES, 100% occluded OM2 s/p JAMES. An IABP, cooling cath and arterial lines placed and patient was admitted to the ICU for further management.    Interval History:  Seizure yesterday, necessitating initiation of keppra/propofol.  No other acute events overnight.    Today's Plan:  - transition IV amiodarone to PO amiodarone  - 40mg IV lasix this AM  - remove venous sheath  - touch base with neuro regarding propofol/keppra plan    Neurology  # Anoxic brain injury    # Status Myoclonus  Intubated, sedated. Cooled to 33 degrees. Rewarmed -. CT head without intracranial pathology. Rhythmic twitching noted  - per EEG physician, status myoclonus.  Started on keppra, fixed rate propofol  - propofol management per Neurocritical care  - keppra per Neurocritical care  - wean fentanyl as able  - continue vEEG   - palliative care consult  - Neurocritical care consult    Cardiovascular  # non sustained VT after revascularization now on VA ECMO  # VF Cardiac arrest 2/2 STEMI  # CAD with STEMI s/p PCI to mLAD, LCx, OM2  # IABP in situ for shock and coronary perfusion  # Cardiomyopathy  # mural thrombus  As above, reportedly brief arrest with ROSC  after 1 shock and ACLS meds. Coronary angiogram revealing 100% occluded mLAD s/p thrombectomy and JAMES, 80% LCx lesion s/p JAMES, 100% occluded OM2 s/p JAMES. Weaned off pressors 0200 . VT storm early , now cannulated on VA ECMO. initially on amiodarone and lidocaine; lidocaine discontinued 9/15. Hbg A1c 7.5%. S/p additional PCI   - turndown tomorrow  - IABP in situ 1:1  ECMO Cares   - Flow 3.7-3.8LPM   - Sweep 0.5LPM   - FiO2 50%   - -194. On heparin ggt  Pressors/inotropes/antiarrythmic:    - transition IV amiodarone to PO amiodarone  GDMT:   - Continue ASA 81mg and ticagrelor 90mg BID    - Hold Lipitor for now given shock liver   - Hold ACE/ARB for now given likely reduced renal fxn after arrest   - Holding beta blocker given shock    Pulmonary  # Acute hypoxemic respiratory failure requiring intubation  # aspiration pneumonia  # Rib fractures  # pl effusions  Vent Settings:  CMV 12/450/7/50%  AB.48/40/95/30  - diuresis  - Wean vent as able  - Daily CXR  - Serial ABGs   - Consider scheduled duonebs if signs of lung dz, currently PRN    - Peridex BID    Gastrointestinal, Nutrition  # Shock liver 2/2 cardiac arrest  LFTs down trending.  - Monitor LFTs  - bowel med to PRN given loose stool    Renal, Electrolytes  # Probable Acute Renal Injury  # Hypoalbuminemia   # Metabolic Alkalosis  # Lactic acidosis, resolved  Cr 1.13, stable.  I/O's: UOP 4053cc yesterday (received 60mg IV lasix x1). Net negative 898cc yesterday  - Monitor urine output  - goal net even to 1L negative today  - Avoid nephrotoxins    Infectious Disease  # Aspiration Pneumonia  # Leukocytosis  WBC 20.0 today, beginning to downtrend.  Tmax 98.4  - Monitor for signs of infection  Cultures thus far:                - No growth to date  Antibiotics               - Vancomycin                 - Zosyn - present. End tomorrow AM    Hematology  # Anemia of critical illness  # Mural thrombus  Hgb 7.3. Platelet 153  - ASA/ticagrelor  for JAMES  - Heparin gtt for mural thrombus, ECMO    Endocrinology  # DM II  - insulin gtt as needed    MSK/Skin  No active issues     Pertinent Lines  L venous sheath (thermogard) - remove today  Right radial arterial line  L femoral artery IABP  17F RFA  25F RFV  8F RSFA (distal reperfusion catheter)  NG  Owens  ETT  Rectal Pouch     ICU Cares:  CXR: ETT, IABP well positioned. Parenchyma grossly normal  Fluids/Feeds: TF at goal.  Fluids not indicated  DVT Prophylaxis: heparin ggt  GI Prophylaxis: protonix  Bowel Regimen: PRN, loose stools  Anticipated Floor Transfer: N/A    I spent 65 minute face-to-face or coordinating care of Brian D Brunner. Over 50% of our time on the unit was spent counseling the patient and/or coordinating care regarding cardiac arrest.    Family Update: updated by me today    Patient seen and discussed with Dr. Emily Shah.  Assessment and plan as above.    Snow Ortiz PA-C  Critical Care Cardiology  Pager: [phone]      Objective Findings:  Temp:  [97.9  F (36.6  C)-98.6  F (37  C)] 98.4  F (36.9  C)  Pulse:  [56-78] 63  Resp:  [12-32] 12  MAP:  [57 mmHg-100 mmHg] 70 mmHg  Arterial Line BP: ()/(28-64) 112/39  FiO2 (%):  [40 %-50 %] 50 %  SpO2:  [97 %-100 %] 99 %    I/O:  Intake/Output Summary (Last 24 hours) at 9/17/2022 0857  Last data filed at 9/17/2022 0800  Gross per 24 hour   Intake 3460.64 ml   Output 4343 ml   Net -882.36 ml       Physical Exam:  GEN: intubated. Appears comfortable  HEENT: no cranial trauma  Pulm: seemingly CTAB  Cardiac: regular rate/rhythm. No murmur, rub, gallop  GI: soft, non distended  Neuro: pupils reactive. Does not follow commands  Integument: no skin breakdown  Vascular: LE warm, well perfused      Medications:    aspirin  81 mg Per Feeding Tube Daily     levETIRAcetam  1,000 mg Intravenous Q12H     multivitamins w/minerals  15 mL Per Feeding Tube Daily     oxymetazoline  2 spray Topical Q4H     pantoprazole  40 mg Oral QAM AC      piperacillin-tazobactam  3.375 g Intravenous Q6H     polyethylene glycol  17 g Oral Daily     protein modular  2 packet Per Feeding Tube 4x Daily     senna-docusate  2 tablet Oral At Bedtime     thiamine  100 mg Oral Daily     ticagrelor  90 mg Oral or Feeding Tube BID       amiodarone 0.5 mg/min (09/17/22 0700)     dextrose       dextrose       fentaNYL 100 mcg/hr (09/17/22 0700)     heparin (PRESSURE BAG) 2 unit/mL in 0.9% NaCl 3 mL/hr (09/17/22 0700)     HEParin 2,000 Units/hr (09/17/22 0700)     insulin regular 6 Units/hr (09/17/22 0700)     Percutaneous Coronary Intervention orders placed (this is information for BPA alerting)       Percutaneous Coronary Intervention orders placed (this is information for BPA alerting)       propofol 30 mcg/kg/min (09/17/22 0700)     BETA BLOCKER NOT PRESCRIBED       STATIN NOT PRESCRIBED           Labs:   CMP  Recent Labs   Lab 09/17/22  0650 09/17/22  0544 09/17/22  0510 09/17/22  0418 09/17/22  0413 09/17/22  0412 09/16/22  2204 09/16/22  2200 09/16/22  1620 09/16/22  1615 09/16/22  1413 09/16/22  1243 09/16/22  1005 09/16/22  0945 09/16/22  0409 09/16/22  0351 09/15/22  0350 09/15/22  0348 09/14/22  0938 09/14/22  0935   NA  --   --   --   --   --  142  --  141  --  143  --  140   < > 140  --  138   < > 140   < > 140   POTASSIUM  --   --   --   --   --  3.9  --  3.5  --  3.6  --  3.5   < > 3.6  --  3.9   < > 4.3   < > 3.7   CHLORIDE  --   --   --   --   --  106  --  104  --  105  --   --   --  103  --  102   < > 108*   < > 108*   CO2  --   --   --   --   --  31*  --  31*  --  34*  --   --   --  31*  --  30*   < > 24   < > 20*   ANIONGAP  --   --   --   --   --  5*  --  6*  --  4*  --   --   --  6*  --  6*   < > 8   < > 12   * 161* 172* 170*   < > 168*   < > 164*   < > 131*   < > 195*   < > 176*  177*   < > 183*   < > 124*   < > 187*  185*   BUN  --   --   --   --   --  27.0*  --  27.2*  --  23.5*  --   --   --  24.3*  --  22.5   < > 17.0   < > 18.5   CR  --   --    --   --   --  1.13  --  1.12  --  1.12  --   --   --  1.08  --  1.16   < > 0.96   < > 1.13   GFRESTIMATED  --   --   --   --   --  74  --  75  --  75  --   --   --  79  --  72   < > 90   < > 74   JACKIE  --   --   --   --   --  8.5*  --  8.6*  --  8.4*  --   --   --  8.6*  --  8.1*   < > 8.0*   < > 8.3*   MAG  --   --   --   --   --  2.3  --  2.4*  --  2.0  --   --   --  2.0  --  2.1   < > 2.2   < > 2.0   PHOS  --   --   --   --   --  3.4  --   --   --   --   --   --   --   --   --  3.1  --  3.1  --  2.8   PROTTOTAL  --   --   --   --   --  5.5*  --  5.5*  --  5.8*  --   --   --  5.6*  --  5.4*   < > 5.4*   < > 5.4*   ALBUMIN  --   --   --   --   --  2.5*  --  2.6*  --  2.6*  --   --   --  2.4*  --  2.6*   < > 2.6*   < > 2.6*   BILITOTAL  --   --   --   --   --  0.4  --  0.4  --  0.4  --   --   --  0.4  --  0.5   < > 0.3   < > 0.4   ALKPHOS  --   --   --   --   --  103  --  82  --  92  --   --   --  88  --  82   < > 74   < > 74   AST  --   --   --   --   --  26  --  25  --  28  --   --   --  30  --  28   < > 46   < > 70*   ALT  --   --   --   --   --  16  --  17  --  19  --   --   --  23  --  20   < > 31   < > 41    < > = values in this interval not displayed.     CBC  Recent Labs   Lab 09/17/22  0412 09/16/22  2200 09/16/22  1615 09/16/22  1243 09/16/22  1113 09/16/22  0945   WBC 20.0* 20.3* 26.4*  --   --  23.1*   RBC 2.67* 2.79* 2.98*  --   --  3.10*   HGB 7.3* 7.7* 8.2* 8.3*   < > 8.5*   HCT 23.3* 24.3* 25.9*  --   --  26.6*   MCV 87 87 87  --   --  86   MCH 27.3 27.6 27.5  --   --  27.4   MCHC 31.3* 31.7 31.7  --   --  32.0   RDW 14.2 14.1 14.2  --   --  14.1    150 170  --   --  174    < > = values in this interval not displayed.     Arterial Blood Gas  Recent Labs   Lab 09/17/22  0537 09/17/22  0413 09/17/22  0138 09/16/22  2358   PH 7.48* 7.49* 7.51* 7.47*   PCO2 40 39 38 44   PO2 95 105 94 94   HCO3 30* 29* 30* 32*   O2PER 50 70  50  50 50 50         Pertinent Imaging Studies:  Coronary angiogram:      Ost RCA lesion is 50% stenosed.    Dist RCA lesion is 60% stenosed.    RPDA lesion is 60% stenosed.    3rd Diag lesion is 99% stenosed.    1st Sept lesion is 50% stenosed.    1st Mrg lesion is 100% stenosed.    2nd Diag lesion is 40% stenosed.    2nd Mrg lesion is 40% stenosed.    1st LPL lesion is 30% stenosed.  1. Patent stents in LAD and LCx/OM, no new lesions noted on diagnostic angiogram as compared to post PCI angiogram on 9/12/22.  2. Successful ECMO cannulation at left femoral vein and artery with distal perfusion sheath.    Echo:   Biplane LVEF is 16%.  On VA ECMO at 3.8LPM.  LV apical thrombus noted.  Right ventricular function, chamber size, wall motion, and thickness are  normal.      Snow Ortiz PA-C  Critical Care Cardiology  Pager: [phone]

## 2022-09-17 NOTE — PLAN OF CARE
Goal Outcome Evaluation: Ongoing, not progressing    Writer assumed care of patient from [id number].    Major Shift Events: RASS - 5 on Propofol and Fentanyl gtts. Patient coughs with suctioning. No obvious seizure activity. NSR with rates in 60s-70s. BP miles. Amiodarone gtt still infusing. IABP 1:1, no issues overnight. No issues with ECMO. ECMO and IABP dressings changed due to oozing/bleeding.TF advanced to 30mL/hr at 0000, due to be advanced to goal rate at 0800. Patient had first bowel movement at 0000, rectal pouch placed for liquid stools. Owens with adequate UOP. Potassium replaced 1x. Ongoing oozing/bleeding from mouth, packing in place. One unit pRBCs given to maintain Hgb > 7.     Plan: Tentatively switch to PO amiodarone in effort to remove femoral CVC. Monitor for seizure activity.     For vital signs and complete assessments, please see documentation flowsheets.     Shellie Bridegs RN on 9/17/2022 at 5:48 AM

## 2022-09-17 NOTE — PROGRESS NOTES
"[facility]: [facility]  NeuroCritical Care Progress Note    Patient Name:  Brian D Brunner  MRN:  [id number]    :  1960  Date of Service:  2022  Primary care provider:  No primary care provider on file.      A/P:   Brian D Brunner is a 61 year old male with a unknown past medical history of admitted on 2022 for Cardiac arrest.  pt was being warmed, rearrested and was cannulated for VA ECMO. NCC consulted for concerns for seizures.     Propofol remains @ 30. EEG improved today. No Myoclonus. Will increase Keppra to 1.5g BID and consider weaning Propofol gtt tomorrow.     NEURO RECS  - Continue vEEG   - Keppra 1.5g BID  - Propofol gtt @30 set rate, no titration  - We will continue to follow and monitor their EEG and neurologic exam, please call #[phone] with any questions      Physical Examination:   /61   Pulse 68   Temp 98.6  F (37  C)   Resp 13   Ht 1.702 m (5' 7\")   Wt 101.5 kg (223 lb 12.3 oz)   SpO2 100%   BMI 35.05 kg/m    General: Adult male patient, lying in bed, critically-ill  HEENT: Normocephalic, atraumatic, no icterus, oral cavity/oropharynx pink and moist, ETT, tongue bleeding  Cardiac: Tele, ECMO   Pulm: CTAB, unlabored, expansion symmetric, no retractions or use of accessory muscles, VENT  Abdomen: Soft, round, bowel sounds present  Extremities: cool, generalized edema, distal pulses +2, well perfused  Skin: No rash or lesion  Psych: sedated  Neuro:  Pt is deeply sedated. Pupils equal and reactive on pupillometry. Pt does not open eyes or follow commands. -cough/gag. No response to noxious stim.     I have personally reviewed all labs and imaging for this patient.      Patient discussed with attending neurologist, Dr. Letitia Muhammad, DNP  Ascom: *[phone]  "

## 2022-09-17 NOTE — PROGRESS NOTES
ECMO Attending Progress Note  2022    Brian D Brunner is a 61 year old male who was cannulated for ECMO  due to VT storm after he presented with [facility] with VT and 2 shocks with ROSC       Cannulation Site:  17 Fr in the L femoral artery  25 Fr in the L femoral vein  8Fr distal reperfusion cannula    Interval events: had seizures when propofol was stopped and was resumed.     Physical Exam:  Temp:  [97.9  F (36.6  C)-98.6  F (37  C)] 98.6  F (37  C)  Pulse:  [56-78] 68  Resp:  [12-32] 12  MAP:  [57 mmHg-100 mmHg] 74 mmHg  Arterial Line BP: ()/(28-63) 113/43  FiO2 (%):  [50 %] 50 %  SpO2:  [98 %-100 %] 100 %    Intake/Output Summary (Last 24 hours) at 2022 1346  Last data filed at 2022 1330  Gross per 24 hour   Intake 4468.99 ml   Output 5550 ml   Net -1081.01 ml    Vent Mode: CMV/AC  (Continuous Mandatory Ventilation/ Assist Control)  FiO2 (%): 50 %  Resp Rate (Set): 12 breaths/min  Tidal Volume (Set, mL): 400 mL  PEEP (cm H2O): 7 cmH2O  Resp: 12       Labs:  Recent Labs   Lab 22  1200 22  0959 22  0758 22  0537   PH 7.46* 7.45 7.48* 7.48*   PCO2 43 43 41 40   PO2 102 103 90 95   HCO3 30* 30* 30* 30*   O2PER 50 50 50 50      Recent Labs   Lab 22  0959 22  0412 22  2200 22  1615   WBC 19.7* 20.0* 20.3* 26.4*   HGB 7.8* 7.3* 7.7* 8.2*     Creatinine   Date Value Ref Range Status   2022 1.15 0.67 - 1.17 mg/dL Final   2022 1.13 0.67 - 1.17 mg/dL Final   2022 1.12 0.67 - 1.17 mg/dL Final   2022 1.12 0.67 - 1.17 mg/dL Final       Blood Flow (Circuit) LPM: 3.84 LPM  Gas Flow  LPM: 0.5 LPM  Gas FiO2   %: 70 %  ACT  (seconds): 194 seconds  Blood Temp  (degrees C): 36.7 C  Pulse Oximetry  (SpO2%): 100 %  Arterial Pressure  mmH mmHg      ECMO Issues including assessments and plan on DOS 2022:  Neuro: Sedated for mechanical ventilation and ECMO and electrical storm was on fentanyl  and propofol-increased keppra per NCC  will maintain on propofol for 24h and retry.  No acute distress.  NIRS stable  b/l  RASS goal: 0-1  CV: Cardiogenic shock.  Hemodynamically stable off pressors, pulse pressure 50. Electrically stable. S/p revascularization of oRCA, LPDA and OM1.switch amio to PO  Pulm: Keep vent settings at rest settings as above.  FEN/Renal: Electrolytes stable w/ replacement protocols in place, Cr stable, UOP stable, gemt;e diuresis  Heme: ACT goal: 180-200, Hemoglobin 7.8 with some oral bleeding ENT consulted.  Minimal oozing around the ECMO cannulas.  ID: [id number] pip/tazo  Cannulae: Position is acceptable on exam and the available imaging.  Distal perfusion cannula is in place and patent.  Extremities are well-perfused     I have personally reviewed the ECMO flows, oxygenation and CO2 clearance, anticoagulation, and cannula position.  I have also personally assessed the patient's systemic response with hemodynamics, oxygenation, ventilation, and bleeding.       The patient requires continued ECMO support and management in the ICU.    Emily Lizama MD  Cardiology critical care  Pager

## 2022-09-17 NOTE — PROGRESS NOTES
ECMO Shift Summary:8103-5217      ECMO Equipment:  Console serial number: [id number]  Circuit Lot number: [id number]  Oxygenator Lot number: [id number]       Patient remains on VA ECMO, all equipment is functioning and alarms are appropriately set. RPM's 3450 with flow range 3.8 L/min. Sweep gas is at 0.5 LPM and FiO2 70%. Circuit remains free of air, clot and fibrin. Cannulas are secure with no bleeding from site. Extremities are warm and edematous. Suctioned ETT for blood tinged secretions.    Significant Shift Events: none    Vent settings:  Vent Mode: CMV/AC  (Continuous Mandatory Ventilation/ Assist Control)  FiO2 (%): 50 %  Resp Rate (Set): 12 breaths/min  Tidal Volume (Set, mL): 400 mL  PEEP (cm H2O): 7 cmH2O  Resp: 12  .    Heparin is running at 2000 u/hr, ACT range 190-194.    Urine output is as charted, blood loss was minimal oozing. Product given included none.       Intake/Output Summary (Last 24 hours) at 9/17/2022 1407  Last data filed at 9/17/2022 1400  Gross per 24 hour   Intake 4214.99 ml   Output 3660 ml   Net 554.99 ml       ECHO:  No results found for this or any previous visit.  No results found for this or any previous visit.      CXR:  Recent Results (from the past 24 hour(s))   XR Chest Port 1 View    Narrative    Exam: XR CHEST PORT 1 VIEW, 9/17/2022 4:46 AM    Indication: ETT, ECMO, and IABP positioning    Comparison: Chest x-ray 9/16/2022    Findings:   ET tube tip projects over the low trachea approximately 2 cm from the  lexy. Enteric tube courses past the level of the diaphragm with tip  excluded from the field-of-view. Intra-aortic balloon pump marker  projects at the level the lexy. Inferior projectile cannula tip  projects over the right atrium.    Persistent cardiomegaly and retrocardiac atelectasis/consolidation. No  appreciable pneumothorax or pleural effusions.      Impression    Impression:   1. ET tube tip projects over the low thoracic trachea.  2. ECMO tip at the right  atrium.  3. Balloon pump marker is at the level of the lexy.  4. Persistent cardiomegaly and retrocardiac consolidation/atelectasis.    I have personally reviewed the examination and initial interpretation  and I agree with the findings.    TAMIA RIZO MD         SYSTEM ID:  [id number]       Labs:  Recent Labs   Lab 09/17/22  1200 09/17/22  0959 09/17/22  0758 09/17/22  0537   PH 7.46* 7.45 7.48* 7.48*   PCO2 43 43 41 40   PO2 102 103 90 95   HCO3 30* 30* 30* 30*   O2PER 50 50 50 50       Lab Results   Component Value Date    HGB 7.8 (L) 09/17/2022    PHGB <30 09/17/2022     (L) 09/17/2022    FIBR 869 (H) 09/17/2022    INR 1.44 (H) 09/17/2022     (H) 09/17/2022    DD 2.81 (H) 09/17/2022    ANTCH 68 (L) 09/17/2022         Plan is to continue to support.      Camila Nassar, RT  ECMO Specialist  9/17/2022 2:07 PM

## 2022-09-17 NOTE — PROGRESS NOTES
ECMO Shift Summary: 1900-0700      ECMO Equipment:  Console serial number: [id number]  Circuit Lot number: [id number]  Oxygenator Lot number: [id number]    Patient remains on VA ECMO, all equipment is functioning and alarms are appropriately set. RPM's 3348 with flow range 3.5-3.9 L/min. Sweep gas is at 0.5 LPM and FiO2 70%. Circuit remains free of air, clot and fibrin. Cannulas are secure with minimal bleeding from site. Extremities are warm. Suctioned ETT for tan secretions.    Significant Shift Events: ECMO dressing changed    Vent settings:  Vent Mode: CMV/AC  (Continuous Mandatory Ventilation/ Assist Control)  FiO2 (%): 50 %  Resp Rate (Set): 12 breaths/min  Tidal Volume (Set, mL): 400 mL  PEEP (cm H2O): 7 cmH2O  Resp: 12  .    Heparin is running at 1900 u/hr, ACT range 165-250.    Urine output is as charted, blood loss was minimal from site. Product given included PRBC.       Intake/Output Summary (Last 24 hours) at 9/17/2022 0500  Last data filed at 9/17/2022 0500  Gross per 24 hour   Intake 3315.46 ml   Output 4188 ml   Net -872.54 ml       ECHO:  No results found for this or any previous visit.  No results found for this or any previous visit.      CXR:  Recent Results (from the past 24 hour(s))   Cardiac Catheterization    Narrative    1. Multivessel coronary disease status post prior PCI to the LAD, OM3, and   mid LCx with patent stents in those areas.  2. PCI s/p JAMES x1 to ostial RCA, JAMES x1 to mid LCx, JAMES x1 to distal LCx,   and DESx1 to OM1.  3. IABP removed and replaced at the end of the case.       Labs:  Recent Labs   Lab 09/17/22  0413 09/17/22  0138 09/16/22  2358 09/16/22  2159   PH 7.49* 7.51* 7.47* 7.50*   PCO2 39 38 44 40   PO2 105 94 94 128*   HCO3 29* 30* 32* 31*   O2PER 70  50  50 50 50 50       Lab Results   Component Value Date    HGB 7.3 (L) 09/17/2022    PHGB <30 09/16/2022     09/17/2022    FIBR 869 (H) 09/17/2022    INR 1.50 (H) 09/17/2022    PTT 91 (H) 09/17/2022    DD  2.50 (H) 09/16/2022    ANTCH 66 (L) 09/16/2022         Plan is Continue with VA ECMO support.      Josee Hernadez, RT  ECMO Specialist  9/17/2022 5:00 AM

## 2022-09-17 NOTE — PROGRESS NOTES
ECMO Shift Summary:3-7p      ECMO Equipment:  Console serial number: [id number]  Circuit Lot number: [id number]  Oxygenator Lot number: [id number]      Patient remains on VA ECMO, all equipment is functioning and alarms are appropriately set. RPM's 3450 with flow range 3.79-3.81 L/min. Sweep gas is at 0.5 LPM and FiO2 70%. Circuit remains free of air, clot and fibrin. Cannulas are secure with no bleeding from site. Extremities are warm.     Significant Shift Events: none    Vent settings:  Vent Mode: CMV/AC  (Continuous Mandatory Ventilation/ Assist Control)  FiO2 (%): 50 %  Resp Rate (Set): 12 breaths/min  Tidal Volume (Set, mL): 400 mL  PEEP (cm H2O): 7 cmH2O  Resp: 12  .    Heparin is running at 2000 u/kg/hr, ACT range 186.    Urine output is charted, blood loss was small. Product given included none.       Intake/Output Summary (Last 24 hours) at 9/17/2022 1822  Last data filed at 9/17/2022 1800  Gross per 24 hour   Intake 3890.08 ml   Output 3020 ml   Net 870.08 ml       ECHO:  No results found for this or any previous visit.  No results found for this or any previous visit.      CXR:  Recent Results (from the past 24 hour(s))   XR Chest Port 1 View    Narrative    Exam: XR CHEST PORT 1 VIEW, 9/17/2022 4:46 AM    Indication: ETT, ECMO, and IABP positioning    Comparison: Chest x-ray 9/16/2022    Findings:   ET tube tip projects over the low trachea approximately 2 cm from the  lexy. Enteric tube courses past the level of the diaphragm with tip  excluded from the field-of-view. Intra-aortic balloon pump marker  projects at the level the lexy. Inferior projectile cannula tip  projects over the right atrium.    Persistent cardiomegaly and retrocardiac atelectasis/consolidation. No  appreciable pneumothorax or pleural effusions.      Impression    Impression:   1. ET tube tip projects over the low thoracic trachea.  2. ECMO tip at the right atrium.  3. Balloon pump marker is at the level of the lexy.  4.  Persistent cardiomegaly and retrocardiac consolidation/atelectasis.    I have personally reviewed the examination and initial interpretation  and I agree with the findings.    TAMIA RIZO MD         SYSTEM ID:  [id number]       Labs:  Recent Labs   Lab 09/17/22  1756 09/17/22  1613 09/17/22  1609 09/17/22  1405 09/17/22  1200   PH 7.45  --  7.47* 7.50* 7.46*   PCO2 44  --  43 40 43   PO2 94  --  82 109* 102   HCO3 30*  --  31* 31* 30*   O2PER 50 70  50 50 50 50       Lab Results   Component Value Date    HGB 7.6 (L) 09/17/2022    PHGB <30 09/17/2022     (L) 09/17/2022    FIBR 941 (H) 09/17/2022    INR 1.40 (H) 09/17/2022     (H) 09/17/2022    DD 4.41 (H) 09/17/2022    ANTCH 68 (L) 09/17/2022         Plan is to continue VA ECMO support.      Jane Stephen, RT  ECMO Specialist  9/17/2022 6:22 PM

## 2022-09-18 NOTE — PROGRESS NOTES
INTERIM REPORT of Video-EEG Monitoring               DATE OF RECORDIN2022          I reviewed the continuing video-EEG monitoring of Brian Brunner.         The EEG during sedated coma remains abnormal due to an ongoing, atypical, generalized burst-suppression pattern, with a relative paucity of faster alpha-beta activities during bursts, and with occasional, non-repetitive, generalized polyspike-wave complexes (occurring both with and without myoclonus synchronized to polyspikes).  This recording excludes non-convulsive status epilepticus as a possible cause of this encephalopathy.         These abnormalities indicate severe electrographic encephalopathy, and an elevated risk of epileptic seizures.  I reviewed these findings with the covering cardiologist, Dr. Emily Lizama.    Sukhdev Silvestre M.D.

## 2022-09-18 NOTE — PROGRESS NOTES
ECMO [place] STUDY  September 18, 2022    Inotropes/Pressors: none  ACT: 172    Vent Mode: CMV/AC  (Continuous Mandatory Ventilation/ Assist Control)  FiO2 (%): 50 %  Resp Rate (Set): 12 breaths/min  Tidal Volume (Set, mL): 400 mL  PEEP (cm H2O): 7 cmH2O  Resp: 13       Flow  Briana BP IABP BP HR O2 Sat  MVO2  IABP  3.1L 112/70-92 104/63-95 79 98  86  On  2.0L 118/70-95 110/64-99 80 99  83  On  1.3L 129/ 119/ 82 100  82  on  (could not flow below 1.3L as pulsatility was so effective that venous pressure became positive)  1.3L 142/68-93 135/74-97 82 98  82  standby    ABG at lowest Flow: 7.41/47/93/30   P/F Ratio: 186         Summary:  --stable EF with decreasing flows  --stable EF compared to the last turndown  --Hemodynamically stable with turndown requiring no pressors  --Oxygenation and ventilation stable with turndown    Plan:  --The patient is ready for decannulation.    Snow Ortiz PA-C  Critical Care Cardiology  [phone]

## 2022-09-18 NOTE — CONSULTS
Vascular Surgery Consult Note  Date: 09/18/2022       Assessment/Recommendations:  Brian D Brunner is a 61 year old male who presented to [facility] on 9/12/2022 d/2 being found unresponsive at work and found to be in cardiac arrest (VF arrest & STEMI) - he was transferred to CVICU on VA ECMO w/ an IABP. They have an unknown pmh.  Vascular Surgery was consulted d/2 bleeding at IABP femoral access site w/ c/f vascular injury.    Vitals w/ sBP in 80s but MAPs > 65. Required 2u pRBC transfused today. Physical exam w/o bleeding at IABP access site w/ fem stop in place and palpable b/l DP. No active bleeding after fem stop removed this evening. Labs demonstrated fairly normal coags.    -ok to remove fem stop when pt stationary, gentle compression is re-bleeds. If fem stop is placed please ensure DP is still palpable on R  -no surgical intervention warranted at this time  - Since compression is able to stop bleeding, holding pressure after removal of IABP can be attempted   Please call Vascular Surgery if any questions. Will follow peripherally    -discussed w/ Dr. Flory Hernandez MD (Fellow)    Dispo: per primary    DEEP FUENTES MD  PGY2, Surgery    Addendum  Flory Valdez MD  Fellow    HPI:   Brian D Brunner is a 61 year old male who presented to [facility] on 9/12/2022 d/2 being found unresponsive at work and found to be in cardiac arrest (VF arrest & STEMI) - he was transferred to CVICU on VA ECMO w/ an IABP. They have an unknown pmh.  Vascular Surgery was consulted d/2 bleeding at IABP femoral access site w/ c/f vascular injury d/2 requiring 2u pRBC today. IABP placed a coupel of days ago but bleeding noted only over last 24 hrs. Position of IABP stable as per cardiology and nursing     -bedside nurse noted that there was significant soaking of all dressings and green sheets  -has not required pressors for hemodynamic support but did require 2u pRBC tranfused  -discussed w/ cardiology about if IABP can be removed -  cardiology would prefer to remove IABP after ECMO decannulation (planned for 9/19/2022)    ROS:  Unable to assess as pt is sedated    PMH:  Unable to assess as pt is sedated  PSH:  Brian D Brunner has a past surgical history that includes Coronary Angiogram (N/A, 9/12/2022); Percutaneous Coronary Intervention Stent (N/A, 9/12/2022); Intraprocedure Aortic Balloon Pump Insertion (N/A, 9/12/2022); Arterial Line Placement (N/A, 9/12/2022); Central Venous Catheter Placement (N/A, 9/12/2022); and Percutaneous Coronary Intervention Aspiration Thrombectomy (N/A, 9/12/2022).    ALLERGIES:  Unable to assess as pt is sedated  FamHx:  Unable to assess as pt is sedated  SocHx:  Unable to assess as pt is sedated    Objective:  Vitals:  B/P: 108/61, T: 98.6, P: 80, R: 12    Temp: 98.6  F (37  C) Temp  Min: 98.2  F (36.8  C)  Max: 98.8  F (37.1  C)  Resp: 12 Resp  Min: 12  Max: 17  SpO2: 100 % SpO2  Min: 96 %  Max: 100 %  Pulse: 80 Pulse  Min: 69  Max: 84    No data recorded    No data recorded.  No data recorded.        Intake/Output Summary (Last 24 hours) at 9/18/2022 1838  Last data filed at 9/18/2022 1800  Gross per 24 hour   Intake 4439.01 ml   Output 3700 ml   Net 739.01 ml       Physical Exam:  Gen: sedated, on ECMO  N:AOx0; sedated  HEENT: trachea midline, atraumatic, anicteric; NGT in place  P: on VA ECMO  CV: RRR on monitor; left fem VA ECMO cannulation w/ minimal bleeding; right fem IABP w/ fem stop removed with no  bleeding  GI: soft, ND, NTTP, no guarding, no rigidity - nonperitonitic  : deferred  MSK: does not moves all extremties  Extremities: WWP, palpable bilateral DP pulses, good capillary refill    Labs:  Recent Labs   Lab 09/18/22  1607 09/18/22  0952 09/18/22  0335 09/17/22  2156   WBC 21.0* 18.8* 19.3* 19.6*   HGB 7.2* 8.4* 7.2* 7.8*   * 137* 148* 142*     Recent Labs   Lab 09/18/22  1815 09/18/22  1607 09/18/22  1606 09/18/22  0959 09/18/22  0952 09/18/22  0342 09/18/22  0335 09/17/22  2201  09/17/22 2156   NA  --  145  --   --  143  --  142  --  141   POTASSIUM  --  3.8  --   --  4.1  --  4.0  --  4.0   CHLORIDE  --  107  --   --  105  --  106  --  105   CO2  --  28  --   --  29  --  31*  --  32*   BUN  --  33.9*  --   --  30.5*  --  31.0*  --  31.4*   CR  --  1.26*  --   --  1.08  --  1.07  --  1.11   * 127*  129* 128*   < > 178*  180*   < > 160*  162*   < > 179*  180*    < > = values in this interval not displayed.     Recent Labs   Lab 09/18/22 1607 09/18/22 0952 09/18/22 0335 09/17/22 2156 09/17/22 0959 09/17/22 0412 09/16/22  0945 09/16/22  0351 09/15/22  0953 09/15/22  0348   MAG 2.1 2.0 2.1 2.1   < > 2.3   < > 2.1   < > 2.2   PHOS  --   --  4.3  --   --  3.4  --  3.1  --  3.1    < > = values in this interval not displayed.     Recent Labs   Lab 09/18/22 1607 09/18/22 0952 09/18/22 0335 09/17/22 2156   AST 35 33 30 29   ALT 19 17 15 16   ALKPHOS 110 118 112 114   BILITOTAL 0.5 0.6 0.5 0.5   ALBUMIN 2.4* 2.7* 2.5* 2.6*   INR 1.29* 1.26* 1.42* 1.34*   PTT 43* 36 110* 89*     Recent Labs   Lab 09/18/22  1607 09/18/22  0952 09/18/22  0335 09/17/22  2156 09/15/22  0350 09/15/22  0348   LACT 1.9 1.1 0.8 0.8   < >  --    LIPASE  --   --   --   --   --  279*    < > = values in this interval not displayed.       Studies:  Recent Results (from the past 24 hour(s))   XR Chest Port 1 View    Narrative    Exam: XR CHEST PORT 1 VIEW, 9/18/2022 2:22 AM    Indication: ETT, ECMO, and IABP positioning    Comparison: Chest x-ray 9/17/2022    Findings:   ET tube tip over the low thoracic trachea. Intra-aortic balloon pump  marker projects just below the level the lexy. Enteric tube courses  past the level of the diaphragm with tip excluded from the  field-of-view. ECMO cannula tip projects over the right atrium.  Cardiac borders are partially obscured. Left basilar/retrocardiac  consolidation. No appreciable pneumothorax. The right lung is  relatively clear.      Impression    Impression:    1. ET tube tip projects over low thoracic trachea.  2. Balloon pump marker just above the level of the lexy.  3. ECMO over the right atrium.  4. Cardiomegaly and left basilar/retrocardiac consolidation.    I have personally reviewed the examination and initial interpretation  and I agree with the findings.    TAMIA RIZO MD         SYSTEM ID:  [id number]   Echocardiogram Limited   Result Value    LVEF  15-20% (severely reduced)    [facility]    [id number]  [id number]  [id number]  [id number]^CHANDU^HALIMA^EDDIE     [facility],[place]  Echocardiography Laboratory  [address] [place] [id number]     Name: BRUNNER, BRIAN D  MRN: [id number]  : 1960  Study Date: 2022 07:59 AM  Age: 61 yrs  Gender: Male  Patient Location: [facility]  Reason For Study: MI  Ordering Physician: HALIMA SCHOFIELD  Performed By: Jenifer Arevalo RDCS     BSA: 2.1 m2  Height: 67 in  Weight: 224 lb  ______________________________________________________________________________  Procedure  Limited Echocardiogram with portions of two-dimensional, color and spectral  Doppler performed.  ______________________________________________________________________________  Interpretation Summary  ECMO turndown from 3.1 l/m to 1.3 l/m  IABP support     Left ventricular function is decreased. The ejection fraction is 15-20%  (severely reduced).  Apical akinesis with an apical thormbus present.  Global right ventricular function is normal.  Trivial pericardial effusion is present.     Basal segments augment at lower speed.  ______________________________________________________________________________  Left Ventricle  Left ventricular size is normal. Left ventricular function is decreased. The  ejection fraction is 15-20% (severely reduced). Apical akinesis with an apical  thormbus present.     Right Ventricle  The right ventricle is normal size. Global right ventricular function is  normal.     Pericardium  Trivial  pericardial effusion is present.     ______________________________________________________________________________  Report approved by: Cris Konety, MDon 09/18/2022 10:53 AM     ______________________________________________________________________________

## 2022-09-18 NOTE — PROGRESS NOTES
ECMO Attending Progress Note  2022    Brian D Brunner is a 61 year old male who was cannulated for ECMO  due to VT storm after he presented with [facility] with VT and 2 shocks with ROSC       Cannulation Site:  17 Fr in the L femoral artery  25 Fr in the L femoral vein  8Fr distal reperfusion cannula     Interval events: had breakthrough seizures when propofol was stopped and was resume @ higher rate.     Physical Exam:  Temp:  [98.2  F (36.8  C)-98.8  F (37.1  C)] 98.6  F (37  C)  Pulse:  [66-84] 84  Resp:  [12-19] 12  MAP:  [70 mmHg-115 mmHg] 71 mmHg  Arterial Line BP: ()/(41-88) 84/54  FiO2 (%):  [50 %] 50 %  SpO2:  [97 %-100 %] 99 %    Intake/Output Summary (Last 24 hours) at 2022 1204  Last data filed at 2022 1100  Gross per 24 hour   Intake 3679.23 ml   Output 3185 ml   Net 494.23 ml    Vent Mode: CMV/AC  (Continuous Mandatory Ventilation/ Assist Control)  FiO2 (%): 50 %  Resp Rate (Set): 12 breaths/min  Tidal Volume (Set, mL): 400 mL  PEEP (cm H2O): 7 cmH2O  Resp: 12       Labs:  Recent Labs   Lab 22  0952 22  0815 22  0753 22  0537   PH 7.43 7.41 7.41 7.42   PCO2 46* 47* 47* 46*   PO2 102 93 88 101   HCO3 31* 30* 30* 30*   O2PER 50 50 50 50      Recent Labs   Lab 22  0952 22  0335 22  2156 22  1609   WBC 18.8* 19.3* 19.6* 18.3*   HGB 8.4* 7.2* 7.8* 7.6*     Creatinine   Date Value Ref Range Status   2022 1.08 0.67 - 1.17 mg/dL Final   2022 1.07 0.67 - 1.17 mg/dL Final   2022 1.11 0.67 - 1.17 mg/dL Final   2022 1.20 (H) 0.67 - 1.17 mg/dL Final       Blood Flow (Circuit) LPM: 3.24 LPM  Gas Flow  LPM: 0.5 LPM  Gas FiO2   %: 70 %  ACT  (seconds): 131 seconds  Blood Temp  (degrees C): 36.8 C  Pulse Oximetry  (SpO2%): 98 %  Arterial Pressure  mmH mmHg      ECMO Issues including assessments and plan on DOS 2022:  Neuro: Sedated for seizures primarily is on ECMO and has not had further VT though on propofol   keppra per NCC may add second agent will maintain on propofol for 24h.  NIRS stable  b/l  RASS goal: 0-1  CV: cannulated for electrical instability.  Hemodynamically stable off pressors, pulse pressure 50. Electrically stable. S/p revascularization of oRCA, LPDA and OM1.on PO amio. Given he will likely be on sedation for longer for neuro issues will decannulate given electrical stability  Pulm: Keep vent settings at rest settings as above.  FEN/Renal: Electrolytes stable w/ replacement protocols in place, Cr stable, UOP stable, diuresis  Heme: ACT goal: 160-180 given bleeding in IABP site, Hemoglobin 8 with some oral bleeding ENT consulted- packing to be removed tomorrow.  Minimal oozing around the ECMO cannulas.  ID: pip/tazo- leave through removal of packing  Cannulae: Position is acceptable on exam and the available imaging.  Distal perfusion cannula is in place and patent.  Extremities are well-perfused     I have personally reviewed the ECMO flows, oxygenation and CO2 clearance, anticoagulation, and cannula position.  I have also personally assessed the patient's systemic response with hemodynamics, oxygenation, ventilation, and bleeding.       The patient requires continued ECMO support and management in the ICU.     Emily Lizama MD  Cardiology critical care  Pager

## 2022-09-18 NOTE — PLAN OF CARE
Major Shift Events: pt ventilated on CMV mode, 50% FiO2, PEEP of 7. HR 70-80 throughout the day, NSR. MAPs 60-70, cards fellow aware. IABP remains in place at 1:1, insertion site bleeding profusely throughout shift. Sandbag ineffective to stop bleed. Additional sutures added to site by MD, bleed continued to worsen. Pressure held, femstop re-applied, 1 unit RBCs given, Vascular consulted. Bilateral lower extremity pulses intact. ECMO turned down at 0800, pt tolerated well. vEEG still in place. For seizure prevention: valproate added to med regimen, keppra given BID, and propofol remains running at 40. Pupillometry reveals pupils are equal/reactive, but sluggish. RASS -5. 120mg Lasix given, UO appropriate.  Fentanyl running at 150mcg/hr for comfort. Insulin gtt in algorithm 4+ to maintain BG within ordered parameters. Mouth is no longer actively bleeding, ENT packing still in place. Afrin topical spray administered Q4H to oral mucosa.    Plan: Decannulate ECMO tomorrow. CT to evaluate neuro status. Continue monitoring for seizures. Vascular to further evaluate R groin site.    For vital signs and complete assessments, please see documentation flowsheets.      Plan of Care Reviewed With: sibling

## 2022-09-18 NOTE — PROGRESS NOTES
"[facility]: [place]  NeuroCritical Care Progress Note    Patient Name:  Brian D Brunner  MRN:  [id number]    :  1960  Date of Service:  2022  Primary care provider:  No primary care provider on file.      A/P:   Brian D Brunner is a 61 year old male with a unknown past medical history of admitted on 2022 for Cardiac arrest.  pt was being warmed, rearrested and was cannulated for VA ECMO. NCC consulted for concerns for seizures.     Shaking overnight. Propofol increased to 40 and symptoms resolved. EEG still in burst suppression but remains at elevated risk for seizures. Will add second agent. VPA with load and continue BID dosing. Recommend repeating CT to assess for an anoxic injury possible not seen on previous CT scans. MRI would be ideal but pt is unable at this time.    NEURO RECS  - Continue vEEG   - Keppra 1.5g BID  - VPA load with 750 BID  - Propofol gtt @40 set rate, no titration  - Repeat CT when able  - We will continue to follow and monitor their EEG and neurologic exam, please call #[phone] with any questions      Physical Examination:   /61   Pulse 81   Temp 98.6  F (37  C)   Resp 12   Ht 1.702 m (5' 7\")   Wt 101.7 kg (224 lb 3.3 oz)   SpO2 99%   BMI 35.12 kg/m    General: Adult male patient, lying in bed, critically-ill  HEENT: Normocephalic, atraumatic, no icterus, oral cavity/oropharynx pink and moist, ETT, tongue bleeding  Cardiac: Tele, ECMO   Pulm: CTAB, unlabored, expansion symmetric, no retractions or use of accessory muscles, VENT  Abdomen: Soft, round, bowel sounds present  Extremities: cool, generalized edema, distal pulses +2, well perfused  Skin: No rash or lesion  Psych: sedated  Neuro:  Pt is deeply sedated. Pupils equal and reactive on pupillometry. Pt does not open eyes or follow commands. -cough/gag. No response to noxious stim.     I have personally reviewed all labs and imaging for this patient.      Patient discussed " with attending neurologist, Dr. Letitia Muhammad, [facility]  Ascom: *[phone]

## 2022-09-18 NOTE — PROGRESS NOTES
[facility]   Critical Care Cardiology - Progress Note    Brian D Brunner MRN: [id number]  Age: 61 year old, : 1960  Admission Date: 2022    Assessment and Plan:  Brian Brunner is a 61 year old male with unknown PMHx who was admitted on 2022 following an OOHCA.      In brief review, pt was found by a co-worker to be hanging from a forklift unresponsive. Patient was lowered to the ground, CPR started, 911 was called, AED was placed and patient received 1 shock, along with 2 mg of epinephrine and 300 mg of Amiodarone with ROSC. EKG showed ST elevation in the later leads. Patient was brought to  [facility] and taken directly to [facility] where he had a coronary angiogram revealing 100% occluded mLAD s/p thrombectomy and JAMES, 80% LCx lesion s/p JAMES, 100% occluded OM2 s/p JAMES. An IABP, cooling cath and arterial lines placed and patient was admitted to the ICU for further management.    Interval History:  Bleeding from IABP site overnight.  Improved with positioned on IABP sheath.  Seizure activity overnight, propofol increased from 30 to 40    Today's Plan:  - discuss seizure activity with Neurocrit  - -160  - 60mg IV lasix this AM; goal net negative 1-2L  - ECMO turndown  - consider decannulation    Neurology  # Anoxic brain injury    # Status Myoclonus  Intubated, sedated. Cooled to 33 degrees. Rewarmed -. CT head without intracranial pathology. Rhythmic twitching noted  - per EEG physician, status myoclonus.  Started on keppra, fixed rate propofol  - propofol management per Neurocritical care  - keppra per Neurocritical care  - wean fentanyl as able  - continue vEEG   - palliative care consult  - Neurocritical care consult    Cardiovascular  # non sustained VT after revascularization now on VA ECMO  # VF Cardiac arrest 2/2 STEMI  # CAD with STEMI s/p PCI to mLAD, LCx, OM2  # IABP in situ for shock and coronary perfusion  # Cardiomyopathy  # mural thrombus  As  above, reportedly brief arrest with ROSC after 1 shock and ACLS meds. Coronary angiogram revealing 100% occluded mLAD s/p thrombectomy and JAMES, 80% LCx lesion s/p JAMES, 100% occluded OM2 s/p JAMES. Weaned off pressors 0200 . VT storm early , now cannulated on VA ECMO. initially on amiodarone and lidocaine; lidocaine discontinued 9/15. Hbg A1c 7.5%. S/p additional PCI   - turndown this AM, please see separate documentation  - IABP in situ 1:1  ECMO Cares   - Flow 3.0-3.7LPM   - Sweep 0.5LPM   - FiO2 50%   - ACT goal 140-160  Pressors/inotropes/antiarrythmic:    - PO amiodarone  GDMT:   - Continue ASA 81mg and ticagrelor 90mg BID    - Hold Lipitor for now given shock liver   - Hold ACE/ARB for now given likely reduced renal fxn after arrest   - Holding beta blocker given shock    Pulmonary  # Acute hypoxemic respiratory failure requiring intubation  # aspiration pneumonia  # Rib fractures  # pl effusions  Vent Settings:  CMV 12/450/7/50%  AB.42/46/101/30  - diuresis  - Wean vent as able  - Daily CXR  - Serial ABGs   - Consider scheduled duonebs if signs of lung dz, currently PRN    - Peridex BID    Gastrointestinal, Nutrition  # Shock liver 2/2 cardiac arrest  LFTs down trending.  - Monitor LFTs  - bowel med to PRN given loose stool    Renal, Electrolytes  # Probable Acute Renal Injury  # Hypoalbuminemia   # Metabolic Alkalosis  # Lactic acidosis, resolved  Cr 1.07, stable.  I/O's: Net positive 1.1L  - Monitor urine output  - diuresis, net negative 1-2L today  - Avoid nephrotoxins    Infectious Disease  # Aspiration Pneumonia  # Leukocytosis  # Oral Packing secondary to tooth removal  WBC 19.3 today, beginning to downtrend.  Tmax 98.6  - Monitor for signs of infection  Cultures thus far:                - No growth to date  Antibiotics               - Vancomycin                 - Zosyn - present. End s/p removal of oral packing    Hematology  # Anemia of critical illness  # Mural thrombus  Hgb 7.2.  Platelet 148  - ASA/ticagrelor for JAMES  - Heparin gtt for mural thrombus, ECMO    Endocrinology  # DM II  - insulin gtt as needed    MSK/Skin  No active issues     Pertinent Lines  Right radial arterial line  L femoral artery IABP  17F RFA  25F RFV  8F RSFA (distal reperfusion catheter)  NG  Owens  ETT  Rectal Pouch     ICU Cares:  CXR: ETT, IABP well positioned. Parenchyma with edema  Fluids/Feeds: TF at goal.  Fluids not indicated  DVT Prophylaxis: heparin ggt  GI Prophylaxis: protonix  Bowel Regimen: PRN, loose stools  Anticipated Floor Transfer: N/A    I spent 90 minute face-to-face or coordinating care of Brian D Brunner. Over 50% of our time on the unit was spent counseling the patient and/or coordinating care regarding cardiac arrest, ECMO management, ECMO turndown.    Family Update: updated by me today    Patient seen and discussed with Dr. Emily Shah.  Assessment and plan as above.    Snow Ortiz PA-C  Critical Care Cardiology  Pager: [phone]      Objective Findings:  Temp:  [98.2  F (36.8  C)-98.6  F (37  C)] 98.4  F (36.9  C)  Pulse:  [64-79] 77  Resp:  [12-19] 17  MAP:  [70 mmHg-115 mmHg] 98 mmHg  Arterial Line BP: (102-146)/(41-88) 122/72  FiO2 (%):  [50 %] 50 %  SpO2:  [98 %-100 %] 99 %    I/O:  Intake/Output Summary (Last 24 hours) at 9/18/2022 0855  Last data filed at 9/18/2022 0800  Gross per 24 hour   Intake 4037.83 ml   Output 3120 ml   Net 917.83 ml     Physical Exam:  GEN: intubated. Appears comfortable  HEENT: no cranial trauma  Pulm: seemingly CTAB  Cardiac: regular rate/rhythm. No murmur, rub, gallop  GI: soft, non distended  Neuro: pupils reactive. Does not follow commands  Integument: no skin breakdown  Vascular: LE warm, well perfused      Medications:    amiodarone  400 mg Oral BID     aspirin  81 mg Per Feeding Tube Daily     furosemide  60 mg Intravenous Once     levETIRAcetam  1,500 mg Intravenous Q12H     multivitamins w/minerals  15 mL Per Feeding Tube Daily      oxymetazoline  2 spray Topical Q4H     pantoprazole  40 mg Oral QAM AC     piperacillin-tazobactam  3.375 g Intravenous Q6H     polyethylene glycol  17 g Oral Daily     protein modular  2 packet Per Feeding Tube 4x Daily     senna-docusate  2 tablet Oral At Bedtime     thiamine  100 mg Oral Daily     ticagrelor  90 mg Oral or Feeding Tube BID       dextrose       dextrose       fentaNYL 150 mcg/hr (09/18/22 0700)     heparin (PRESSURE BAG) 2 unit/mL in 0.9% NaCl 3 mL/hr (09/18/22 0700)     HEParin 2,000 Units/hr (09/18/22 0700)     insulin regular 8 Units/hr (09/18/22 0600)     Percutaneous Coronary Intervention orders placed (this is information for BPA alerting)       Percutaneous Coronary Intervention orders placed (this is information for BPA alerting)       propofol 40 mcg/kg/min (09/18/22 0700)     BETA BLOCKER NOT PRESCRIBED       STATIN NOT PRESCRIBED           Labs:   CMP  Recent Labs   Lab 09/18/22  0541 09/18/22  0501 09/18/22  0342 09/18/22  0335 09/17/22  2201 09/17/22  2156 09/17/22  1800 09/17/22  1609 09/17/22  1008 09/17/22  0959 09/17/22  0413 09/17/22  0412 09/16/22  0409 09/16/22  0351 09/15/22  0350 09/15/22  0348   NA  --   --   --  142  --  141  --  143  --  141  --  142   < > 138   < > 140   POTASSIUM  --   --   --  4.0  --  4.0  --  3.6  --  3.7  --  3.9   < > 3.9   < > 4.3   CHLORIDE  --   --   --  106  --  105  --  105  --  105  --  106   < > 102   < > 108*   CO2  --   --   --  31*  --  32*  --  29  --  31*  --  31*   < > 30*   < > 24   ANIONGAP  --   --   --  5*  --  4*  --  9  --  5*  --  5*   < > 6*   < > 8   * 141* 168* 160*  162*   < > 179*  180*   < > 121*  119*  125*   < > 183*  188*   < > 168*   < > 183*   < > 124*   BUN  --   --   --  31.0*  --  31.4*  --  30.0*  --  27.7*  --  27.0*   < > 22.5   < > 17.0   CR  --   --   --  1.07  --  1.11  --  1.20*  --  1.15  --  1.13   < > 1.16   < > 0.96   GFRESTIMATED  --   --   --  79  --  76  --  69  --  73  --  74   < > 72   <  > 90   JACKIE  --   --   --  8.7*  --  8.6*  --  8.4*  --  8.3*  --  8.5*   < > 8.1*   < > 8.0*   MAG  --   --   --  2.1  --  2.1  --  2.2  --  2.1  --  2.3   < > 2.1   < > 2.2   PHOS  --   --   --  4.3  --   --   --   --   --   --   --  3.4  --  3.1  --  3.1   PROTTOTAL  --   --   --  5.7*  --  5.8*  --  5.6*  --  5.4*  --  5.5*   < > 5.4*   < > 5.4*   ALBUMIN  --   --   --  2.5*  --  2.6*  --  2.6*  --  2.3*  --  2.5*   < > 2.6*   < > 2.6*   BILITOTAL  --   --   --  0.5  --  0.5  --  0.4  --  0.5  --  0.4   < > 0.5   < > 0.3   ALKPHOS  --   --   --  112  --  114  --  106  --  98  --  103   < > 82   < > 74   AST  --   --   --  30  --  29  --  27  --  27  --  26   < > 28   < > 46   ALT  --   --   --  15  --  16  --  14  --  15  --  16   < > 20   < > 31    < > = values in this interval not displayed.     CBC  Recent Labs   Lab 09/18/22  0335 09/17/22  2156 09/17/22  1609 09/17/22  0959   WBC 19.3* 19.6* 18.3* 19.7*   RBC 2.60* 2.77* 2.75* 2.77*   HGB 7.2* 7.8* 7.6* 7.8*   HCT 23.0* 24.3* 24.1* 24.4*   MCV 89 88 88 88   MCH 27.7 28.2 27.6 28.2   MCHC 31.3* 32.1 31.5 32.0   RDW 13.9 14.0 14.1 14.1   * 142* 143* 142*     Arterial Blood Gas  Recent Labs   Lab 09/18/22  0537 09/18/22  0337 09/18/22  0335 09/18/22  0214 09/17/22  2340   PH 7.42  --  7.43 7.44 7.44   PCO2 46*  --  45 45 44   PO2 101  --  93 91 91   HCO3 30*  --  30* 30* 30*   O2PER 50 50  70 50 50 50         Pertinent Imaging Studies:  Coronary angiogram:     Ost RCA lesion is 50% stenosed.    Dist RCA lesion is 60% stenosed.    RPDA lesion is 60% stenosed.    3rd Diag lesion is 99% stenosed.    1st Sept lesion is 50% stenosed.    1st Mrg lesion is 100% stenosed.    2nd Diag lesion is 40% stenosed.    2nd Mrg lesion is 40% stenosed.    1st LPL lesion is 30% stenosed.  1. Patent stents in LAD and LCx/OM, no new lesions noted on diagnostic angiogram as compared to post PCI angiogram on 9/12/22.  2. Successful ECMO cannulation at left femoral vein and  artery with distal perfusion sheath.    Echo:   Biplane LVEF is 16%.  On VA ECMO at 3.8LPM.  LV apical thrombus noted.  Right ventricular function, chamber size, wall motion, and thickness are  normal.      Snow Ortiz PA-C  Critical Care Cardiology  Pager: [phone]

## 2022-09-18 NOTE — PLAN OF CARE
Goal Outcome Evaluation: Ongoing, not progressing    Writer assumed care of patient from [id number]    Major Shift Events:  RASS -5. Coughs with oral care/suctioning. Began exhibiting seizure-like BLE rhythmic jerking at approximately 0140, button pressed on vEEG and Cards Fellow paged. NeuroCrit resident contacted by Fellow and came to bedside to evaluate. Propofol dose increased from straight rate of 30mcg/kg/min to range dose up to 50mcg/kg/min for seizure activity. Seizures stopped after increasing Propofol to 40mcg/kg/min. Oozing from IABP site worsened to persistent bleeding, FemStop and hemostatic dressing applied. Several attempts to remove FemStop (after 1hr applied each time) unsuccessful. Per Cards Fellow recs, FemStop left in place overnight. No active bleeding with Femstop in place and still able to palpate/doppler BLE pulses. Ongoing bleeding from ECMO site and new onset bleeding from arterial line overnight. ACT goal decreased to 160-180. Unable to full turn patient due to FemStop and bleeding. When initially attempting to achieve hemostasis of IABP site, patient became increasingly hypertensive with SBP as high as 150s. ECMO flows decreased to approximately 3 LPM, Fentanyl gtt increased to 150mcg/hr, and PRN Fentanyl bolus of 50mcg given x2 with improvement in BPs  (SPB in 110s-120s). No other issues with IABP or ECMO overnight. Adequate UOP, no stool output. One unit pRBCs given to maintain Hgb > 7.     Plan: Bleeding management. Seizure management. Weaning mechanical support as directed by primary team.    For vital signs and complete assessments, please see documentation flowsheets.     Shellie Bridges RN on 9/18/2022 at 5:33 AM

## 2022-09-18 NOTE — PROGRESS NOTES
ECMO Shift Summary: [id number]      ECMO Equipment:  Console serial number: [id number]  Circuit Lot number: [id number]  Oxygenator Lot number: [id number]    Patient remains on VA ECMO, all equipment is functioning and alarms are appropriately set. RPM's 3140 with flow range 3.0-3.10 L/min. Sweep gas is at 0.5 LPM and FiO2 70%. Circuit remains free of air, clot and fibrin. Cannulas are secure with increased  bleeding from site, dressing is saturated. Extremities are warm.     Significant Shift Events: Patient is bleeding from all line sites. Femstop applied to IABP site. ACT ordered lowered to 160-180    Vent settings:  Vent Mode: CMV/AC  (Continuous Mandatory Ventilation/ Assist Control)  FiO2 (%): 50 %  Resp Rate (Set): 12 breaths/min  Tidal Volume (Set, mL): 400 mL  PEEP (cm H2O): 7 cmH2O  Resp: 12  .    Heparin is running at 2000 u/hr, ACT range 177-182.    Urine output is as charted, blood loss was large amount. Product given included PRBC.       Intake/Output Summary (Last 24 hours) at 9/18/2022 0445  Last data filed at 9/18/2022 0400  Gross per 24 hour   Intake 4201.01 ml   Output 3055 ml   Net 1146.01 ml       ECHO:  No results found for this or any previous visit.  No results found for this or any previous visit.      CXR:  Recent Results (from the past 24 hour(s))   XR Chest Port 1 View    Narrative    Exam: XR CHEST PORT 1 VIEW, 9/17/2022 4:46 AM    Indication: ETT, ECMO, and IABP positioning    Comparison: Chest x-ray 9/16/2022    Findings:   ET tube tip projects over the low trachea approximately 2 cm from the  lexy. Enteric tube courses past the level of the diaphragm with tip  excluded from the field-of-view. Intra-aortic balloon pump marker  projects at the level the lexy. Inferior projectile cannula tip  projects over the right atrium.    Persistent cardiomegaly and retrocardiac atelectasis/consolidation. No  appreciable pneumothorax or pleural effusions.      Impression    Impression:   1. ET  tube tip projects over the low thoracic trachea.  2. ECMO tip at the right atrium.  3. Balloon pump marker is at the level of the lexy.  4. Persistent cardiomegaly and retrocardiac consolidation/atelectasis.    I have personally reviewed the examination and initial interpretation  and I agree with the findings.    TAMIA RIZO MD         SYSTEM ID:  [id number]       Labs:  Recent Labs   Lab 09/18/22  0337 09/18/22  0335 09/18/22  0214 09/17/22  2340 09/17/22  2156   PH  --  7.43 7.44 7.44 7.43   PCO2  --  45 45 44 45   PO2  --  93 91 91 82   HCO3  --  30* 30* 30* 30*   O2PER 50  70 50 50 50 50       Lab Results   Component Value Date    HGB 7.2 (L) 09/18/2022    PHGB <30 09/17/2022     (L) 09/18/2022    FIBR 941 (H) 09/17/2022    INR 1.34 (H) 09/17/2022    PTT 89 (H) 09/17/2022    DD 4.41 (H) 09/17/2022    ANTCH 68 (L) 09/17/2022         Plan is continue with VA ECMO.      Josee Hernadez, RT  ECMO Specialist  9/18/2022 4:45 AM

## 2022-09-18 NOTE — PROGRESS NOTES
INTERIM REPORT of Video-EEG Monitoring              DATE OF RECORDIN2022         I reviewed the ongoing video-EEG monitoring of Brian Brunner.         The EEG during sedated coma was abnormal due to an ongoing, atypical, generalized burst-suppression pattern, with a relative paucity of faster alpha-beta activities during bursts, and with rare, non-repetitive, generalized polyspike-wave complexes (without associated myoclonus).  No electrographic seizures were observed.         These abnormalities indicate severe electrographic encephalopathy.  This recording excludes non-convulsive status epilepticus as a possible cause of this encephalopathy.    Sukhdev Silvestre M.D.

## 2022-09-19 NOTE — PROGRESS NOTES
ECLS Discontinuation Note:    ECLS was discontinued 9/19/2022 @ 1540.    Jerome Holly, RT  ECMO Specialist  9/19/2022 6:06 PM

## 2022-09-19 NOTE — PROGRESS NOTES
ECMO Shift Summary:       ECMO Equipment:  Console serial number: [id number]  Circuit Lot number: [id number]  Oxygenator Lot number: [id number]    Patient remains on VA ECMO, all equipment is functioning and alarms are appropriately set. RPM's 3200 with flow range 3.4 L/min. Sweep gas is at 0.5 LPM and FiO2 70%. Circuit remains free of air, clot and fibrin. Cannulas are secure with some oozing from site. Extremities are edematous and warm.     Significant Shift Events: Bleeding has slowed from cannula and IABP site. Femstop still in place. More seizure activity tonight. Neuro at bedside.    Vent settings:  Vent Mode: CMV/AC  (Continuous Mandatory Ventilation/ Assist Control)  FiO2 (%): 50 %  Resp Rate (Set): 12 breaths/min  Tidal Volume (Set, mL): 400 mL  PEEP (cm H2O): 7 cmH2O  Resp: 12  .    Heparin is running at 1000 u/hr, ACT range 150's.    Urine output is as charted, blood loss was minimal. No product given .       Intake/Output Summary (Last 24 hours) at 2022 0424  Last data filed at 2022 0400  Gross per 24 hour   Intake 4477.66 ml   Output 3690 ml   Net 787.66 ml       ECHO:  No results found for this or any previous visit.  No results found for this or any previous visit.      CXR:  Recent Results (from the past 24 hour(s))   Echocardiogram Limited   Result Value    LVEF  15-20% (severely reduced)    Narrative    [id number]  [id number]  [id number]  [id number]^CHANDU^HALIMA^EDDIE     [facility]  Echocardiography Laboratory  [address] [place] [id number]     Name: BRUNNER, BRIAN D  MRN: [id number]  : 1960  Study Date: 2022 07:59 AM  Age: 61 yrs  Gender: Male  Patient Location: Pending sale to [facility]  Reason For Study: [place]  Ordering Physician: HALIMA SCHOFIELD  Performed By: Jenifer Arevalo RDCS     BSA: 2.1 m2  Height: 67 in  Weight: 224 lb  ______________________________________________________________________________  Procedure  Limited  Echocardiogram with portions of two-dimensional, color and spectral  Doppler performed.  ______________________________________________________________________________  Interpretation Summary  ECMO turndown from 3.1 l/m to 1.3 l/m  IABP support     Left ventricular function is decreased. The ejection fraction is 15-20%  (severely reduced).  Apical akinesis with an apical thormbus present.  Global right ventricular function is normal.  Trivial pericardial effusion is present.     Basal segments augment at lower speed.  ______________________________________________________________________________  Left Ventricle  Left ventricular size is normal. Left ventricular function is decreased. The  ejection fraction is 15-20% (severely reduced). Apical akinesis with an apical  thormbus present.     Right Ventricle  The right ventricle is normal size. Global right ventricular function is  normal.     Pericardium  Trivial pericardial effusion is present.     ______________________________________________________________________________  Report approved by: Amish Walton 09/18/2022 10:53 AM     ______________________________________________________________________________          Labs:  Recent Labs   Lab 09/19/22  0343 09/19/22  0341 09/19/22  0147 09/18/22  2355 09/18/22  2150   PH  --  7.40 7.41 7.39 7.42   PCO2  --  50* 50* 51* 48*   PO2  --  108* 96 102 87   HCO3  --  31* 32* 31* 31*   O2PER 50  70 50 50 50 50       Lab Results   Component Value Date    HGB 7.7 (L) 09/19/2022    PHGB <30 09/18/2022     (L) 09/19/2022    FIBR 845 (H) 09/18/2022    INR 1.25 (H) 09/18/2022    PTT 45 (H) 09/18/2022    DD 17.76 (H) 09/18/2022    ANTCH 74 (L) 09/18/2022         Plan is to decannulate today and remove IABP.      Josee Hernadez, RT  ECMO Specialist  9/19/2022 4:24 AM

## 2022-09-19 NOTE — PLAN OF CARE
Admitted/transferred from: OR  Reason for admission/transfer: post decannulation  2 RN skin assessment: completed by Nuria BLACKWOOD and Kash AGOSTO  Result of skin assessment and interventions/actions: L groin surgical site WNL, no other changes in prior skin assessment   Height, weight, drug calc weight: Done  Patient belongings (see Flowsheet)  MDRO education added to care planN/A    Arrived from OR with increased vent settings. Plan to move head CT to tomorrow per Dr. Austin due to increased respiratory needs. Coughs to suction, but does not withdraw or follow commands - remains fully sedated. Seizures continued throughout shift, visible LLE twitched. EEG aware.   ?

## 2022-09-19 NOTE — ANESTHESIA CARE TRANSFER NOTE
Patient: Brian D Brunner    Procedure: Procedure(s):  REMOVAL, CANNULA, ADULT, FOR ECMO       Diagnosis: Heart failure (H) [I50.9]  Diagnosis Additional Information: No value filed.    Anesthesia Type:   General     Note:    Oropharynx: endotracheal tube in place and ventilatory support  Level of Consciousness: iatrogenic sedation    Level of Supplemental Oxygen (L/min / FiO2): FiO2 100%  Independent Airway: airway patency not satisfactory and stable  Dentition: dentition unchanged  Vital Signs Stable: post-procedure vital signs reviewed and stable  Report to RN Given: handoff report given  Patient transferred to: ICU  Comments: Tx to 4E with full monitors, O2 10L per ambu, to 4510, placed on ventilator.  IV infusions reviewed with 4E staff, including propofol, epinephrine, insulin and fentanyl.  VSS, report to RN.  ICU Handoff: Call for PAUSE to initiate/utilize ICU HANDOFF, Identified Patient, Identified Responsible Provider, Reviewed the Pertinent Medical History, Discussed Surgical Course, Reviewed Intra-OP Anesthesia Management and Issues during Anesthesia, Set Expectations for Post Procedure Period and Allowed Opportunity for Questions and Acknowledgement of Understanding      Vitals:  Vitals Value Taken Time   /64 09/19/22 1756   Temp     Pulse 109 09/19/22 1807   Resp 21 09/19/22 1807   SpO2 99 % 09/19/22 1807   Vitals shown include unvalidated device data.    Electronically Signed By: VLADIMIR Menjivar CRNA  September 19, 2022  6:08 PM

## 2022-09-19 NOTE — PLAN OF CARE
Goal Outcome Evaluation: Ongoing, not progressing    Writer assumed care of patient from [id number].    Major Shift Events: RASS -5. At 0120, patient began having seizure-like, repetitive jerking in LLE (appeared similar to seizure presentation from previous night.) Cards fellow notified. Fellow paged NeuroCrit who came to bedside to eval patient. Propofol dose increased to 50mcg/kg/min per NeuroCrit recs. Seizure-like jerking stopped after increasing Propofol dose. Intermittently breathing over vent, and intermittently coughed with suctioning. MAP stable in 60s-80s. No issues with or changes to IABP. FemStop remains in place at IABP site to maintain hemostasis. One ECMO alarm this AM at approximately 0610 when ECMO lost flow due to patient coughing. Adequate flow quickly resumed once patient stopped coughing. Slight oozing/bleeding from ECMO and arterial line sites. Hgb stable. Platelets trending down. No stool output overnight. Owens with adequate pink-tinged urine output. Difficulty managing BG, requiring 6-18 units/hr.      Plan: Decannulate from [facility] ECMO today. Tentatively remove IABP in OR for safety/bleeding management. Head CT after decannulation. Sedation wean per NeuroCrit.     For vital signs and complete assessments, please see documentation flowsheets.     Shellie Bridges RN on 9/19/2022 at 6:21 AM

## 2022-09-19 NOTE — PROGRESS NOTES
"Otolaryngology Progress Note  09/19/2022      Subjective/Intvl events: Per chart review and nursing, patient had minor bleeding from tongue over the weekend. It has resolved and packing remains in place. He had been anticoagulated for PCI on 9/16 and vascular surgery was consulted due to bleeding at IABP femoral access site with concern for vascular injury. He now has a lower ACT goal and further bleeding has been controlled.    O: /61   Pulse 82   Temp 98.6  F (37  C) (Bladder)   Resp 12   Ht 1.702 m (5' 7\")   Wt 100.2 kg (220 lb 14.4 oz)   SpO2 100%   BMI 34.60 kg/m    General: laying in bed, no acute distress, sedated  HEAD: normocephalic, atraumatic  Face: symmetrical, no swelling, edema, or erythema.   Eyes: closed due to sedation  Ears: no external abnormalities  Nose: no anterior drainage, intact and midline septum without perforation or hematoma, no active bleeding or crusting  Mouth: moist, no ulcers, tongue midline and symmetric, feeding and breathing tube with bite block in place. Packing pulled from left posterior mandibular alveolus. Packing saturated with old, dark blood. Large clot removed with packing. Significantly poor dentition with multiple roots exposed and at risk of falling out. Dental performed two bedside extractions. Left posterior molar decayed with pulp exposed. Oozing noted from socket just anterior to the decaying molar. Additional socket noted on the anterior mandible, without bleeding. No further packing placed.  Oropharynx: uvula midline, no oropharyngeal erythema or bleeding  Neck: thick neck  Neuro: Sedated  Respiratory: intubated on ventilation  Skin: no rashes or skin lesions of the face/neck  Cardio: on VA ECMO       A/P: Brian D Brunner is a 61 year old male with unknown PMHx admitted following cardiac arrest at work. He is currently on Heparin and VA ECMO. Kerlix removed from oral cavity, saturated with old, dark blood. Clot removed with Kerlix. Slight oozing " noted at bleeding site. Multiple teeth were loose and decaying. Dental reported to have removed addition tooth at bedside 9/15. No additional packing was placed.     - Nursing may place a single Afrin soaked gauze if bleeding increases, it is important to only place one piece of packing to avoid losing any packing in the mouth or airway  - Patient to be on abx for staph coverage when packing in place, currently no requirement for abx  - Rest of cares per primary team  - Please contact ENT with any additional questions or concerns or if patient experiences increase in bleeding     --Patient and plan to be discussed with Dr. Tim Galeas PA-C  Otolaryngology-Head & Neck Surgery  Please contact ENT with questions by dialing [phone] and entering job [phone] when prompted.

## 2022-09-19 NOTE — PROGRESS NOTES
Cardiology ICU Daily Progress     Patient Name: Brian D Brunner  Medical Record Number: [id number]  YOB: 1960  PCP: No primary care provider on file.         Chief Complaint:   Brian Brunner is a 61 year old male with unknown PMHx who was admitted on 9/12/2022 following an OOHCA         History of Present Illness:   In brief review, pt was found by a co-worker to be hanging from a forklift unresponsive. Patient was lowered to the ground, CPR started, 911 was called, AED was placed and patient received 1 shock, along with 2 mg of epinephrine and 300 mg of Amiodarone with ROSC. EKG showed ST elevation in the later leads. Patient was brought to  [facility] and taken directly to [facility] where he had a coronary angiogram revealing 100% occluded mLAD s/p thrombectomy and JAMES, 80% LCx lesion s/p JAMES, 100% occluded OM2 s/p JAMES. An IABP, cooling cath and arterial lines placed and patient was admitted to the ICU for further management.    Cardiac risk factors: unknown PMHx  Last Echocardiogram 9/18: ECMO turndown from 3.1 l/m to 1.3 l/m, and IABP  Left ventricular function is decreased. The ejection fraction is 15-20%  (severely reduced). Apical akinesis with an apical thormbus present.  Global right ventricular function is normal. Trivial pericardial effusion is present.  Last Catheterization 9/16: Multivessel coronary disease status post prior PCI to the LAD, OM3, and mid LCx with patent stents in those areas. PCI s/p JAMES x1 to ostial RCA, JAMES x1 to mid LCx, JAMES x1 to distal LCx, and DESx1 to OM1  EKG 9/20: SR 90's, no signs of acute ischemia, QTc  0.472       Assessment and Plan:   Assessment:  WBC increased over the night with elevated lactic, blood cultures sent. Continues seizures over the night. Versed bolused and  Increased. RASS -5, does have a cough    Plan:  - ongoing seizure management per Neurocritical care  -Remove IABP   -restart heparin gtt at 1700 if no signs of bleeding  -MRI when able    Assessment  and Plan by System:  Neurology  # Anoxic brain injury    # Status Myoclonus  Intubated, sedated. Cooled to 33 degrees. Rewarmed 9/13-9/14. CT head without intracranial pathology. Rhythmic twitching noted 9/16 - per EEG physician, status myoclonus.  Started on keppra, fixed rate propofol, fent, versed, valproic acid  -vEEG 9/20: Study consistent with severe diffuse encephalopathy.  Degree of background suppression more intense than would be expected with the sedative drips at levels employed.  Runs of generalized epileptiform activity continue which at times meet criteria for nonconvulsive seizures.  Last such seizure occurred between 3 and 4 AM today and appeared to respond to modest increase in midazolam drip.  Fent 150  Versed 4  Prop 50  Plan:  - propofol management per Neurocritical care  - keppra 2000 mg BID per Neurocritical care  - valproic acid 1000 mg Q8 hours per Neurocritical care  - onfi 5 mg  BID  - wean fentanyl as able  - continue vEEG   -MRI when able  - palliative care consult  - Neurocritical care consult  Cardiovascular  # non sustained VT after revascularization now on VA ECMO  # VF Cardiac arrest 2/2 STEMI  # CAD with STEMI s/p PCI to mLAD, LCx, OM2  # IABP in situ for shock and coronary perfusion  # Cardiomyopathy  # mural thrombus  Lactic 1.4, liver fx normal, CR stable, Good UO  As above, reportedly brief arrest with ROSC after 1 shock and ACLS meds. Coronary angiogram revealing 100% occluded mLAD s/p thrombectomy and JAMES, 80% LCx lesion s/p JAMES, 100% occluded OM2 s/p JAMES. Weaned off pressors 0200 9/13. VT storm early 9/14, now cannulated on VA ECMO. initially on amiodarone and lidocaine; lidocaine discontinued 9/15. Hbg A1c 7.5%. S/p additional PCI 9/16  - 9/20 IABP removal  Plan:  -Continue PO Amiodarone   -Restart heparin infusion at 1700 if no signs of active bleeding       CAD Management:  - DAPT: Continue ASA 81mg and ticagrelor 90mg BID  Goal Directed Medical Therapy:  -ARNI: Hold  given likely reduced renal fxn after arrest  - Statin: Hold Lipitor for now given shock liver  - BB: Hold due to cardiogenic shock  -MRA Hold due to likely reduced renal fx after arrest  -SGLT2i  Hold due to likely reduced renal fx after arrest  Pulmonary  # Acute hypoxemic respiratory failure requiring intubation  # aspiration pneumonia  # Rib fractures  # pl effusions  -O2 Delivery: Vent Mode: CMV/AC  (Continuous Mandatory Ventilation/ Assist Control)  FiO2 (%): 50 %  Resp Rate (Set): 16 breaths/min  Tidal Volume (Set, mL): 400 mL  PEEP (cm H2O): 8 cmH2O  Resp: 14    -CXR 9/20: clear, ETT 2 cn above lexy, IABP slightly above lexy  ABG:   - Wean vent as able  - Daily CXR  - Serial ABGs   - Consider scheduled duonebs if signs of lung dz, currently PRN    - Peridex BID  Gastrointestinal, Nutrition  # Shock liver 2/2 cardiac arrest  LFTs down trending.  - Monitor LFTs  - bowel meds  -Nutrition: twp freddy HN 40 ml hour  -BM: 9/17  Plan:  -increase bowel meds  Renal, Electrolytes  # MARIANA  # Hypoalbuminemia   # Metabolic Alkalosis  # Lactic acidosis, resolved  Cr 1.12, stable, CO2 25,   I/O's: Net positive 3338 yesterday( 75 ml/ hour) , + 1400 today  Plan:  -diurese later today if stable to get closer to even  - Monitor urine output  - Avoid nephrotoxins  Hematology  # Anemia, secondary to acute blood loss and critical illness  # Mural thrombus  # Thrombocytopenia, worsening  Hgb 8.1 stable. Platelet 120. INR 1.28, XA <0.10  - ASA/ticagrelor for JAMES  - follow platelets  - Restart Heparin gtt for mural thrombus at 1700  Endocrinology  # DM II  -191  8-10 units of insulin an hour  - insulin gtt as needed  Infectious Disease  # Aspiration Pneumonia  # Leukocytosis  # Oral Packing secondary to tooth removal  WBC 34.5 (41.3) today, beginning to downtrend.  Tmax 100.1, CRP trending down 248  - Monitor for signs of infection  Cultures thus far:                - No growth to date  Antibiotics               -  "Vancomycin 9/13                - Zosyn 9/13- present. End s/p removal of oral packing  MSK/Skin  -L groin ECMO site with sutures. No hematoma, no oozing, distal pulses palpable and equal  -R groin IABP site. No hematoma, no oozing, distal pulses palpated and equal  -9/19:Oral packing removed by ENT  Plan:  -monitor old line sites for signs of infection    ICU Cares:  CXR: clear, devices in appropriate position  Fluids/Feeds:  TF  DVT Prophylaxis: heparin infusion  GI Prophylaxis: protonix  Bowel Regimen: senna and miralax  Anticipated Floor Transfer: NA  Lines/Tubes/Drains:  PIV X2  Arterial line  RIJ quad lumen CVC  OG  Owens  ETT    Code Status: FULL    Family updated by me    Patient seen, discussed, and plan reviewed with     Time Spent on this Encounter   Lisandro was seen and evaluated by me on 09/20/22.  He was in critical condition as the result of refractory VT/VF arrest, cardiogenic shock.    His condition is now Critical.      The acute issues managed by me today include  Removal of IABP,   Supportive interventions provided and/or ordered by me include fluid status, cardiogenic shock, oxygenation     Total Critical Care time spent by me, excluding procedures, was 104 minutes.    VLADIMIR Lloyd CNP Over 50% of our time on the unit was spent counseling the patient and/or coordinating care regarding cardiac arrest.    Rosalinda Bernabe DNP APRN CNP  [facility]  Pager [phone]           Review of Systems:   Negative except noted above         Allergies:   Not on File            Physical Exam:   Vitals were reviewed.  Blood pressure 105/64, pulse 85, temperature 98.6  F (37  C), resp. rate 16, height 1.702 m (5' 7\"), weight 101.9 kg (224 lb 10.4 oz), SpO2 97 %.    General: In bed, in NAD  HEENT: PERRL, no scleral icterus or injection  CARDIAC: RRR, no m/r/g appreciated. Peripheral pulses dopplered  RESP: Mechanical ventilation; CTAB, no wheezes, rhonchi or crackles appreciated.  GI: " Soft, non-tender, BS+, no masses appreciated  : Owens  EXTREMITIES: No LE edema, pulses 2+. Femoral access site w/o bleeding, dressing c/d/i. No bruits appreciated.   SKIN: No acute lesions appreciated  NEURO: Intubated and sedated           Data:   CMP  Recent Labs   Lab 09/20/22  0655 09/20/22  0458 09/20/22  0359 09/20/22  0353 09/20/22  0000 09/19/22  2359 09/19/22 2010 09/19/22 2006 09/19/22  1816 09/19/22  1717 09/19/22  0947 09/19/22  0944 09/19/22  0347 09/19/22  0341 09/18/22  0342 09/18/22  0335 09/17/22  0413 09/17/22  0412   NA  --   --   --  145  --  147*  --  149*  --  149*   < > 146*  --  145   < > 142   < > 142   POTASSIUM  --   --   --  3.6  --  3.7  --  3.8  --  3.8   < > 4.1  --  4.1   < > 4.0   < > 3.9   CHLORIDE  --   --   --  110*  --  110*  --  112*  --   --   --  107  --  107   < > 106   < > 106   CO2  --   --   --  25  --  25  --  28  --   --   --  31*  --  33*   < > 31*   < > 31*   ANIONGAP  --   --   --  10  --  12  --  9  --   --   --  8  --  5*   < > 5*   < > 5*   * 163* 167* 165*  171*   < > 144*   < > 117*  118*   < > 133*   < > 148*  147*   < > 184*  191*   < > 160*  162*   < > 168*   BUN  --   --   --  51.7*  --  50.8*  --  49.5*  --   --   --  44.8*  --  42.6*   < > 31.0*   < > 27.0*   CR  --   --   --  1.12  --  1.14  --  1.18*  --   --   --  1.29*  --  1.35*   < > 1.07   < > 1.13   GFRESTIMATED  --   --   --  75  --  74  --  71  --   --   --  63  --  60*   < > 79   < > 74   JACKIE  --   --   --  8.5*  --  8.5*  --  8.8  --   --   --  8.5*  --  8.6*   < > 8.7*   < > 8.5*   MAG  --   --   --  2.3  --  2.2  --  2.3  --   --   --  2.3  --  2.3   < > 2.1   < > 2.3   PHOS  --   --   --  2.6  --   --   --   --   --   --   --   --   --  5.0*  --  4.3  --  3.4   PROTTOTAL  --   --   --  4.7*  --  4.7*  --  5.0*  --   --   --  5.3*  --  5.3*   < > 5.7*   < > 5.5*   ALBUMIN  --   --   --  2.1*  --  2.1*  --  2.1*  --   --   --  2.4*  --  2.3*   < > 2.5*   < > 2.5*   BILITOTAL   --   --   --  0.3  --  0.3  --  0.5  --   --   --  0.6  --  0.5   < > 0.5   < > 0.4   ALKPHOS  --   --   --  94  --  94  --  101  --   --   --  106  --  99   < > 112   < > 103   AST  --   --   --  37  --  39  --  43  --   --   --  48  --  37   < > 30   < > 26   ALT  --   --   --  17  --  18  --  19  --   --   --  21  --  19   < > 15   < > 16    < > = values in this interval not displayed.     CBC  Recent Labs   Lab 09/20/22 0353 09/19/22 2359 09/19/22 2006 09/19/22  1717 09/19/22  1427 09/19/22  0944   WBC 34.5* 41.3* 40.8*  --   --  25.8*   RBC 2.74* 2.92* 3.08*  --   --  2.66*   HGB 8.1* 8.7* 9.2* 9.8*   < > 7.6*   HCT 23.9* 25.6* 27.1*  --   --  23.7*   MCV 87 88 88  --   --  89   MCH 29.6 29.8 29.9  --   --  28.6   MCHC 33.9 34.0 33.9  --   --  32.1   RDW 13.7 13.8 13.7  --   --  13.9   * 133* 129*  --   --  116*    < > = values in this interval not displayed.     INR  Recent Labs   Lab 09/20/22 0353 09/19/22 2359 09/19/22 2006 09/19/22  0944   INR 1.28* 1.24* 1.22* 1.19*     Arterial Blood Gas  Recent Labs   Lab 09/20/22  0533 09/20/22 0353 09/20/22  0000 09/19/22 2157   PH 7.43 7.61* 7.54* 7.58*   PCO2 43 29* 32* 29*   PO2 158* 125* 119* 179*   HCO3 29* 29* 27 27   O2PER 50 50 60 60     Lipids  Recent Labs   Lab 09/16/22 0351   CHOL 78   HDL 40   LDL 24     TSHNo lab results found in last 7 days.  [id number] lab results found in last 7 days.  TroponinInvalid input(s): TROP    Imaging/procedure results:  XR Abdomen Port 1 View  Narrative: Exam: XR ABDOMEN PORT 1 VIEW, 9/19/2022 5:24 PM    Indication: Missing needles. Additional history: Incomplete count of  needles per tech and OR RN.    Comparison: CT 9/14/2022    Findings:   No radiodense foreign surgical instrumentation present with a surgical  needle identified within the imaged pelvis and left groin. Surgical  clips project over the left hip and proximal thigh. Soft tissue air  and is presumably postsurgical. Owens catheter tip terminates  over the  pelvis. Inferior approach central line tip projects over the right  hemipelvis.  Impression: Impression: Post ECMO decannulation.  No radiodense foreign surgical  needle identified within the field of view.    [Urgent Result: No retained surgical needle]    Finding was identified on 2022 5:24 PM.     OR 8 MYA Rebeca was contacted by Dr. Gilmore at 2022 5:27 PM  and verbalized understanding of the urgent finding.     I have personally reviewed the examination and initial interpretation  and I agree with the findings.    RENETTA STOREY MD         SYSTEM ID:  [id number]  Cardiac Catheterization  1. Multivessel coronary disease status post prior PCI to the LAD, OM3, and   mid LCx with patent stents in those areas.  2. PCI s/p JAMES x1 to ostial RCA, JAMES x1 to mid LCx, JAMES x1 to distal LCx,   and DESx1 to OM1.  3. IABP removed and replaced at the end of the case.  Cardiac Catheterization    Ost RCA lesion is 50% stenosed.    Dist RCA lesion is 60% stenosed.    RPDA lesion is 60% stenosed.    3rd Diag lesion is 99% stenosed.    1st Sept lesion is 50% stenosed.    1st Mrg lesion is 100% stenosed.    2nd Diag lesion is 40% stenosed.    2nd Mrg lesion is 40% stenosed.    1st LPL lesion is 30% stenosed.    2nd LPL lesion is 60% stenosed.     1. Patent stents in LAD and LCx/OM, no new lesions noted on diagnostic   angiogram as compared to post PCI angiogram on 22.  2. Successful ECMO cannulation at left femoral vein and artery with distal   perfusion sheath.  EEG Video 12-26 hr Unmonitored  EEG Video 12-26 hr Unmonitored Result    VIDEO EEG DATE: 22  VIDEO EEG [id number]  VIDEO EEG DAY#: 6  VIDEO EEG SOURCE FILE DURATION: 23 hours and 54 minutes    PATIENT HISTORY: 61-year-old status postcardiac arrest.  Patient is   intubated and encephalopathic in ICU.  Periods of jerking have been   recorded previously suggesting myoclonic status epilepticus.  Euthermic.    During this study, patient was  treated with fentanyl 150 mcg/h.  Propofol   40mcg/kg/min, increased to 50 mcg/kg/min after midnight.    TECHNICAL SUMMARY: This is a video-EEG monitoring study. EEG was recorded   from 23 scalp electrodes placed according to the 10-20 international   system. Additional electrodes were utilized for referencing, artifact   detection, and recording from other cerebral regions. Qualified   technicians attached EEG electrodes, set up the study, monitored and   reviewed EEG recordings, and disconnected EEG electrodes when appropriate.   Video was continuously recorded. Video was reviewed for clinical   correlation and to assist with EEG interpretations.     BACKGROUND ACTIVITY: No normal background.  During initial portion of the   study, discontinuous EEG with runs of irregular delta and theta, typically   30 to 40  V in amplitude, up to 5 seconds in duration, alternating with   periods of significant suppression in which no clear electrocerebral   activity was seen at 2  V/mm.  These periods of suppression lasted as long   as 5 seconds and occupied about 50% of the recording.   Generalized spikes   noted, 1-2 per 12-second page.  Around 11 PM, persistence of generalized   spikes increased so that they were now seen at the rate of 10 per   12-second page.  There were also periods of paroxysmal appearing beta that   lasted up to 3 seconds.  These were not associated with clinical changes   on video.  Propofol was increased around midnight to 50 mcg/kg/min.    Persistence of generalized epileptiform activity decreased after about   1:50 AM and previously noted discontinuous pattern with very little   epileptiform activity was noted.  Following around 4 AM, epileptiform   pattern reappeared with generalized polyspikes, up to 1 second in   duration, occupying about 40% of ongoing recording.  Rare bursts of   generalized paroxysmal fast activity were also noted.    ACTIVATION PROCEDURE: Stimulation was performed utilizing  ICU protocol at   around 10:42 AM on 9/18 and did not induce any changes in background.     OTHER INTERICTAL ABNORMALITIES: See above.    ICTAL ABNORMALITIES: Video was intermittently reviewed.  Jerks or other   clinical activity were not seen during periods with generalized   polyspike's or periods with paroxysmal appearing beta.  Staff pressed   event marker at approximately 1:10 AM on 9/19 reporting leg jerks.    Blankets were removed from legs and no clear jerking was seen on video.    EEG during this period of time showed frequent generalized spikes and   polyspikes but no sustained electrographic seizures.    IMPRESSION: Severely abnormal.  1) background activity consistent with severe diffuse encephalopathy.    Findings may in part be related to sedative drips employed but are more   severe than would be expected with the doses reported.  2) persistent of generalized spikes and polyspikes with persistence   exceeding 50% at times.  These respond somewhat to minor increase in   propofol drip but then recur.  They are not associated with obvious   clinical changes.  At times, findings approach standard criteria for   nonconvulsive status epilepticus.    Miguel Angel Joiner MD  EPILEPSY STAFF   XR Chest Port 1 View  Narrative: EXAM: XR CHEST PORT 1 VIEW  9/19/2022 1:20 AM     HISTORY:  ETT, ECMO, and IABP positioning       COMPARISON:  Chest x-ray 9/18/2022    FINDINGS: AP radiograph of the chest. Endotracheal tube projecting  over the midthoracic trachea with the tip approximately 2.1 cm above  the lexy. Intra-aortic balloon pump with marker at the level of the  lexy. Inferior approach ECMO cannula with tip overlying the right  atrium. Partly visualized enteric tube coursing below the diaphragm  and inferior to the field of view.    Stable enlargement of the cardiomediastinal silhouette. No  pneumothorax or significant pleural effusion. Unchanged dense  retrocardiac opacity. No focal airspace opacity  in the right lung.  Visualized upper abdomen is unremarkable.  Impression: IMPRESSION:  1. Stable support devices as detailed above.  2. Stable cardiomegaly and dense retrocardiac opacity.    I have personally reviewed the examination and initial interpretation  and I agree with the findings.    TAMIA RIZO MD         SYSTEM ID:  [id number]  EEG Video 12-26 hr Unmonitored  EEG Video 12-26 hr Unmonitored Result    EEG #-5 (Day 5 of Video-EEG Monitoring)    START DATE OF RECORDIN2022    DURATION OF RECORDIN hours, 14 minutes.    CLINICAL SUMMARY: This video-EEG monitoring procedure was performed in   evaluation of encephalopathy and seizures following cardiac arrest in   Brian Brunner, who is a 61-year-old man.    The patient was reported to be receiving continuous infusions of fentanyl   and propofol, and intermittent levetiracetam on this day of monitoring.    TECHNICAL SUMMARY:  This continuous EEG monitoring procedure was performed   with 25 scalp electrodes in 10-20 system placements, and additional scalp,   precordial and other surface electrodes used for electrical referencing   and artifact detection.  Qualified technicians attached EEG electrodes,   set up the study, monitored and reviewed EEG recordings and disconnected   EEG electrodes as appropriate.  Video monitoring was utilized and   periodically reviewed by EEG technologists and the physician for   electroclinical correlation.     EEG ACTIVITIES DURING COMA:    During sedated coma, there was no evidence of wake-sleep cycling.  There was an ongoing generalized burst-suppression pattern, with   suppressions usually lasting 1-6 seconds and bursts of high-amplitude 2-8   Hz delta-theta activities usually lasting 1-8 seconds, often with   polyspikes for less than 1 second at the end of bursts, occurring both   with and without associated lower extremity myoclonus synchronized to   polyspikes, as single jerks.     No electrographic  seizures or periods of continuous myoclonus were   recorded during this day of monitoring.      SUMMARY OF DAY 5 OF VIDEO-EEG MONITORING:         The EEG during sedated coma remains abnormal due to an ongoing,   atypical, generalized burst-suppression pattern, with a relative paucity   of faster alpha-beta activities during bursts, and with occasional,   non-repetitive, generalized polyspike-wave complexes (occurring both with   and without myoclonus synchronized to polyspikes).       These abnormalities indicate severe electrographic encephalopathy,   and an elevated risk of epileptic seizures.  This recording excludes   non-convulsive status epilepticus as a possible cause of this   encephalopathy.  Clinical correlation is recommended.   Sukhdev Silvestre M.D., Professor of Neurology   EEG Video 12-26 hr Unmonitored  EEG Video 12-26 hr Unmonitored Result    EEG #-4 (Day 4 of Video-EEG Monitoring)    START DATE OF RECORDIN2022    DURATION OF RECORDIN hours, 49 minutes.    CLINICAL SUMMARY: This diagnostic video-EEG monitoring procedure was   performed in evaluation of encephalopathy and seizures following cardiac   arrest in Brian Brunner, who is a 61-year-old man.    The patient was reported to be receiving continuous infusions of fentanyl   and propofol, and intermittent midazolam and levetiracetam on this day of   monitoring.    TECHNICAL SUMMARY:  This continuous EEG monitoring procedure was performed   with 25 scalp electrodes in 10-20 system placements, and additional scalp,   precordial and other surface electrodes used for electrical referencing   and artifact detection.  Qualified technicians attached EEG electrodes,   set up the study, monitored and reviewed EEG recordings and disconnected   EEG electrodes as appropriate.  Video monitoring was utilized and   periodically reviewed by EEG technologists and the physician for   electroclinical correlation.     EEG ACTIVITIES DURING COMA:     During sedated coma, there was no evidence of wake-sleep cycling.  There was an ongoing generalized burst-suppression pattern, with   suppressions usually lasting 1-5 seconds and bursts of high-amplitude 2-8   Hz delta-theta activities usually lasting 1-8 seconds, often with   polyspikes for less than 1 second at the end of bursts, without associated   myoclonus.   Vigorous sensory stimulation did not induce any definite response.      No electrographic seizures were recorded during this day of monitoring.      SUMMARY OF DAY 4 OF VIDEO-EEG MONITORING:         The EEG during sedated coma was abnormal due to an ongoing, atypical,   generalized burst-suppression pattern, with a relative paucity of faster   alpha-beta activities during bursts, and with rare, non-repetitive,   generalized polyspike-wave complexes (without associated myoclonus).  No electrographic seizures were observed.         These abnormalities indicate severe electrographic encephalopathy.    This recording excludes non-convulsive status epilepticus as a possible   cause of this encephalopathy.  Clinical correlation is recommended.   Sukhdev Silvestre M.D., Professor of Neurology

## 2022-09-19 NOTE — ANESTHESIA PREPROCEDURE EVALUATION
Anesthesia Pre-Procedure Evaluation    Patient: Brian D Brunner   MRN: [id number] : 1960        Procedure : Procedure(s):  REMOVAL, CANNULA, ADULT, FOR ECMO          History reviewed. No pertinent past medical history.   Past Surgical History:   Procedure Laterality Date     CV ARTERIAL LINE PLACEMENT N/A 2022    Procedure: Arterial Line Placement;  Surgeon: Lul Porter MD;  Location:  [facility]     CV CENTRAL VENOUS CATHETER PLACEMENT N/A 2022    Procedure: Central Venous Catheter Placement;  Surgeon: Lul Porter MD;  Location:  [facility]     CV CORONARY ANGIOGRAM N/A 2022    Procedure: Coronary Angiogram;  Surgeon: Lul Porter MD;  Location:  [facility]     CV CORONARY ANGIOGRAM N/A 2022    Procedure: Coronary Angiogram;  Surgeon: Vasquez Gardner MD;  Location:  [facility]     CV CORONARY ANGIOGRAM N/A 2022    Procedure: Coronary Angiogram;  Surgeon: Lul Porter MD;  Location:  [facility]     CV EXTRACORPERAL MEMBRANE OXYGENATION N/A 2022    Procedure: Extracorporeal Membrance Oxygenation;  Surgeon: Vasquez Gardner MD;  Location:  [facility]     CV INTRA AORTIC BALLOON N/A 2022    Procedure: Intraprocedure Aortic Balloon Pump Insertion;  Surgeon: Lul Porter MD;  Location:  [facility]     CV INTRA AORTIC BALLOON N/A 2022    Procedure: Intraprocedure Aortic Balloon Pump Insertion;  Surgeon: Lul Porter MD;  Location:  [facility]     CV INTRA AORTIC BALLOON REMOVAL N/A 2022    Procedure: Intraprocedure Aortic Balloon Pump Removal;  Surgeon: Lul Porter MD;  Location:  [facility]     CV PCI ASPIRATION THROMECTOMY N/A 2022    Procedure: Percutaneous Coronary Intervention Aspiration Thrombectomy;   Surgeon: Lul Porter MD;  Location:  [facility]     CV PCI CHRONIC TOTAL OCCLUSION N/A 9/16/2022    Procedure: Percutaneous Coronary Intervention - Chronic Total Occlusion;  Surgeon: Lul Porter MD;  Location:  [facility]     CV PCI STENT DRUG ELUTING N/A 9/12/2022    Procedure: Percutaneous Coronary Intervention Stent;  Surgeon: Lul Porter MD;  Location:  [facility]     CV PCI STENT DRUG ELUTING N/A 9/16/2022    Procedure: Percutaneous Coronary Intervention Stent;  Surgeon: Lul Porter MD;  Location:  [facility]      Not on File   Social History     Tobacco Use     Smoking status: Not on file     Smokeless tobacco: Not on file   Substance Use Topics     Alcohol use: Not on file      Wt Readings from Last 1 Encounters:   09/19/22 100.2 kg (220 lb 14.4 oz)              OUTSIDE LABS:  CBC:   Lab Results   Component Value Date    WBC 25.8 (H) 09/19/2022    WBC 22.5 (H) 09/19/2022    HGB 7.6 (L) 09/19/2022    HGB 7.7 (L) 09/19/2022    HCT 23.7 (L) 09/19/2022    HCT 24.0 (L) 09/19/2022     (L) 09/19/2022     (L) 09/19/2022     BMP:   Lab Results   Component Value Date     (H) 09/19/2022     09/19/2022    POTASSIUM 4.1 09/19/2022    POTASSIUM 4.1 09/19/2022    CHLORIDE 107 09/19/2022    CHLORIDE 107 09/19/2022    CO2 31 (H) 09/19/2022    CO2 33 (H) 09/19/2022    BUN 44.8 (H) 09/19/2022    BUN 42.6 (H) 09/19/2022    CR 1.29 (H) 09/19/2022    CR 1.35 (H) 09/19/2022     (H) 09/19/2022     (H) 09/19/2022     COAGS:   Lab Results   Component Value Date    PTT 45 (H) 09/19/2022    INR 1.19 (H) 09/19/2022    FIBR 775 (H) 09/19/2022     POC: No results found for: BGM, HCG, HCGS  HEPATIC:   Lab Results   Component Value Date    ALBUMIN 2.4 (L) 09/19/2022    PROTTOTAL 5.3 (L) 09/19/2022    ALT 21 09/19/2022    AST 48 09/19/2022    ALKPHOS 106 09/19/2022     BILITOTAL 0.6 09/19/2022     OTHER:   Lab Results   Component Value Date    PH 7.43 09/19/2022    LACT 1.3 09/19/2022    A1C 7.5 (H) 09/12/2022    JACKIE 8.5 (L) 09/19/2022    PHOS 5.0 (H) 09/19/2022    MAG 2.3 09/19/2022    LIPASE 279 (H) 09/15/2022    .00 (H) 09/19/2022     (H) 09/19/2022       Anesthesia Plan    ASA Status:  4      Anesthesia Type: General.     - Airway: ETT   Induction: Intravenous.   Maintenance: Inhalation.        Consents            Postoperative Care    Pain management: IV analgesics.   PONV prophylaxis: Ondansetron (or other 5HT-3), Dexamethasone or Solumedrol     Comments:                Kaushal Bang MD

## 2022-09-19 NOTE — PROGRESS NOTES
EEG CLINICAL NEUROPHYSIOLOGY PRELIMINARY REPORT    EEG through approximately 10 AM today reviewed.  Fentanyl 150 mcg/h, propofol 40 mcg/kg/min, increased to 50 mcg/kg/min around midnight.  Discontinuous EEG with moderate amplitude theta and delta alternating with periods of severe attenuation.  Unreactive.  Starting approximately 11 PM on 9/18, periods of generalized polyspikes returned with persistence approaching 50 to 60%.  EEG during these periods of time approaches accepted criteria for nonconvulsive status epilepticus.  No clear clinical changes during these periods of time.  Event marker pressed by staff early this morning occurred during 1 of these periods of abundant epileptiform activity but no clear jerking was noted on video.  These periods of very persistent epileptiform activity occur intermittently for durations lasting up to 2 hours or so and alternate with periods of severe suppression.  EEG since approximately 830 this morning has shown a discontinuous pattern with little epileptiform activity.    Study is severely abnormal.  1) background activity consistent with severe diffuse encephalopathy.  Findings may in part be related to sedative drips employed but are more severe than would be expected with the doses reported.  2) Persistent generalized spikes and polyspikes with persistence exceeding 50% at times.  These respond somewhat to minor increase in propofol drip but then recur.  They are not associated with obvious clinical changes.  At times, findings approach standard criteria for nonconvulsive status epilepticus.    Miguel Angel Joiner MD  Contact information for physicians covering Epilepsy and EEG is available on [facility].  Click search, enter neurology adult/[facility] in group name box, click on neurology adult/[facility], then click Staff Epilepsy and EEG.

## 2022-09-19 NOTE — BRIEF OP NOTE
[facility] Brief Operative Note    Pre-operative diagnosis: Heart failure (H) [I50.9]   Post-operative diagnosis * No post-op diagnosis entered *  As above   Procedure: Procedure(s):  REMOVAL, CANNULA, ADULT, FOR ECMO   Surgeon(s): Surgeon(s) and Role:     * Sammy Austin MD - Primary     * Patricio Finley NP - Assisting   Estimated blood loss: * No values recorded between 9/19/2022  2:50 PM and 9/19/2022  5:21 PM *    Specimens: * No specimens in log *   Findings: dopplerable pulse in right femoral artery after repair

## 2022-09-19 NOTE — PROGRESS NOTES
ECMO Attending Progress Note  2022       Brian D Brunner is a 61 year old male who was cannulated for ECMO  due to VT storm after he presented with [facility] with VT and 2 shocks with ROSC       Cannulation Site:  17 Fr in the L femoral artery  25 Fr in the L femoral vein  8Fr distal reperfusion cannula       Interval events: seizures overnight bleeding from IABP slowed with lower ACT goals and gentle femstop compression    Physical Exam:  Temp:  [98.2  F (36.8  C)-98.8  F (37.1  C)] 98.6  F (37  C)  Pulse:  [78-85] 81  Resp:  [11-19] 11  MAP:  [61 mmHg-103 mmHg] 81 mmHg  Arterial Line BP: ()/(45-77) 102/58  FiO2 (%):  [50 %] 50 %  SpO2:  [96 %-100 %] 100 %    Intake/Output Summary (Last 24 hours) at 2022 0829  Last data filed at 2022 0800  Gross per 24 hour   Intake 4166.33 ml   Output 3685 ml   Net 481.33 ml    Vent Mode: CMV/AC  (Continuous Mandatory Ventilation/ Assist Control)  FiO2 (%): 50 %  Resp Rate (Set): 12 breaths/min  Tidal Volume (Set, mL): 400 mL  PEEP (cm H2O): 7 cmH2O  Resp: 11       Labs:  Recent Labs   Lab 22  0744 22  0548 22  0343 22  0341 22  0147   PH 7.42 7.43  --  7.40 7.41   PCO2 50* 48*  --  50* 50*   PO2 132* 115*  --  108* 96   HCO3 32* 32*  --  31* 32*   O2PER 50 50 50  70 50 50      Recent Labs   Lab 22  0341 22  2150 22  1607 22  0952   WBC 22.5* 22.6* 21.0* 18.8*   HGB 7.7* 8.1* 7.2* 8.4*     Creatinine   Date Value Ref Range Status   2022 1.35 (H) 0.67 - 1.17 mg/dL Final   2022 1.38 (H) 0.67 - 1.17 mg/dL Final   2022 1.26 (H) 0.67 - 1.17 mg/dL Final   2022 1.08 0.67 - 1.17 mg/dL Final       Blood Flow (Circuit) LPM: 2.94 LPM  Gas Flow  LPM: 0.5 LPM  Gas FiO2   %: 70 %  ACT  (seconds): 152 seconds  Blood Temp  (degrees C): 36.8 C  Pulse Oximetry  (SpO2%): 66 %  Arterial Pressure  mmH mmHg      ECMO Issues including assessments and plan on DOS 2022:  Neuro: Sedated for  seizures primarily is on ECMO and has not had further VT though on propofol  keppra per NCCadded valproic acid 9/18 will maintain on propofol. NIRS stable  b/l  RASS goal: 0-1  CV: cannulated for electrical instability.  Hemodynamically stable off pressors, pulse pressure 50. Electrically stable. S/p revascularization of oRCA, LPDA and OM1.on PO amio. Given he will likely be on sedation for longer for neuro issues will decannulate given electrical stability  Pulm: Keep vent settings at rest settings as above.  FEN/Renal: Electrolytes stable w/ replacement protocols in place, Cr increased likely contracted, UOP stable.  Heme: ACT goal: 140-160 given bleeding in IABP site, Hemoglobin 7.7 with some oral bleeding ENT consulted- packing to be removed tomorrow.  Minimal oozing around the ECMO cannulas.  ID: pip/[id number]- leave through removal of packing- today cultures negative to date  Cannulae: Position is acceptable on exam and the available imaging.  Distal perfusion cannula is in place and patent.  Extremities are well-perfused     I have personally reviewed the ECMO flows, oxygenation and CO2 clearance, anticoagulation, and cannula position.  I have also personally assessed the patient's systemic response with hemodynamics, oxygenation, ventilation, and bleeding.       The patient requires continued ECMO support and management in the ICU.     Emily Lizama MD  Cardiology critical care  Pager

## 2022-09-19 NOTE — PROGRESS NOTES
"    ECMO Shift Summary:[id number]      ECMO Equipment:  Console serial number: [id number]  Circuit Lot number: [id number]  Oxygenator Lot number: [id number]    Patient remains on VA ECMO, all equipment is functioning and alarms are appropriately set. RPM's 3200 with flow range 3.4 L/min. Sweep gas is at 0.5 LPM and FiO2 70%. Circuit remains free of air, clot and fibrin. Cannulas are secure with no bleeding from site. Extremities are warm and edematous. Suctioned ETT for small amount of blood tinged secretions.    Significant Shift Events: significant bleeding from R IABP site, MD and vascular aware. Fem stop in place, with light pressure. Plan to decan on Monday and remove IABP at that time.    Vent settings:  Vent Mode: CMV/AC  (Continuous Mandatory Ventilation/ Assist Control)  FiO2 (%): 50 %  Resp Rate (Set): 12 breaths/min  Tidal Volume (Set, mL): 400 mL  PEEP (cm H2O): 7 cmH2O  Resp: 14  .    Heparin is running at 1000 u/hr, ACT range 131-173, new \"verbal\" goal 140-160.    Urine output is as charted, blood loss was oozing. Product given included 1 unit(s) PRBC.       Intake/Output Summary (Last 24 hours) at 9/18/2022 2148  Last data filed at 9/18/2022 2100  Gross per 24 hour   Intake 4506.31 ml   Output 3725 ml   Net 781.31 ml       ECHO:  No results found for this or any previous visit.  No results found for this or any previous visit.      CXR:  Recent Results (from the past 24 hour(s))   XR Chest Port 1 View    Narrative    Exam: XR CHEST PORT 1 VIEW, 9/18/2022 2:22 AM    Indication: ETT, ECMO, and IABP positioning    Comparison: Chest x-ray 9/17/2022    Findings:   ET tube tip over the low thoracic trachea. Intra-aortic balloon pump  marker projects just below the level the lexy. Enteric tube courses  past the level of the diaphragm with tip excluded from the  field-of-view. ECMO cannula tip projects over the right atrium.  Cardiac borders are partially obscured. Left basilar/retrocardiac  consolidation. No " appreciable pneumothorax. The right lung is  relatively clear.      Impression    Impression:   1. ET tube tip projects over low thoracic trachea.  2. Balloon pump marker just above the level of the lexy.  3. ECMO over the right atrium.  4. Cardiomegaly and left basilar/retrocardiac consolidation.    I have personally reviewed the examination and initial interpretation  and I agree with the findings.    TAMIA RIZO MD         SYSTEM ID:  [id number]   Echocardiogram Limited   Result Value    LVEF  15-20% (severely reduced)    Narrative    [id number]  [id number]  [id number]  [id number]^CHANDU^HALIMA^EDDIE     [facility],[place]  Echocardiography Laboratory  [address] [place] [address]     Name: BRUNNER, BRIAN D  MRN: [id number]  : 1960  Study Date: 2022 07:59 AM  Age: 61 yrs  Gender: Male  Patient Location: [facility]  Reason For Study: MI  Ordering Physician: HALIMA SCHOFIELD  Performed By: Jenifer Arevalo RDCS     BSA: 2.1 m2  Height: 67 in  Weight: 224 lb  ______________________________________________________________________________  Procedure  Limited Echocardiogram with portions of two-dimensional, color and spectral  Doppler performed.  ______________________________________________________________________________  Interpretation Summary  ECMO turndown from 3.1 l/m to 1.3 l/m  IABP support     Left ventricular function is decreased. The ejection fraction is 15-20%  (severely reduced).  Apical akinesis with an apical thormbus present.  Global right ventricular function is normal.  Trivial pericardial effusion is present.     Basal segments augment at lower speed.  ______________________________________________________________________________  Left Ventricle  Left ventricular size is normal. Left ventricular function is decreased. The  ejection fraction is 15-20% (severely reduced). Apical akinesis with an apical  thormbus present.     Right Ventricle  The right  ventricle is normal size. Global right ventricular function is  normal.     Pericardium  Trivial pericardial effusion is present.     ______________________________________________________________________________  Report approved by: Amish Walton 09/18/2022 10:53 AM     ______________________________________________________________________________          Labs:  Recent Labs   Lab 09/18/22  1951 09/18/22  1813 09/18/22  1609 09/18/22  1607 09/18/22  1414   PH 7.40 7.41  --  7.43 7.40   PCO2 49* 48*  --  45 50*   PO2 99 91  --  86 114*   HCO3 31* 30*  --  30* 31*   O2PER 50 50 70  50 50 50       Lab Results   Component Value Date    HGB 7.2 (L) 09/18/2022    PHGB <30 09/18/2022     (L) 09/18/2022    FIBR 845 (H) 09/18/2022    INR 1.29 (H) 09/18/2022    PTT 43 (H) 09/18/2022    DD 17.76 (H) 09/18/2022    ANTCH 74 (L) 09/18/2022         Plan is to continue to support and decan on monday.      Camila Nassar, RT  ECMO Specialist  9/18/2022 9:48 PM

## 2022-09-19 NOTE — PROGRESS NOTES
[facility]   Critical Care Cardiology - Progress Note    Brian D Brunner MRN: [id number]  Age: 61 year old, : 1960  Admission Date: 2022    Assessment and Plan:  Brian Brunner is a 61 year old male with unknown PMHx who was admitted on 2022 following an OOHCA.      In brief review, pt was found by a co-worker to be hanging from a forklift unresponsive. Patient was lowered to the ground, CPR started, 911 was called, AED was placed and patient received 1 shock, along with 2 mg of epinephrine and 300 mg of Amiodarone with ROSC. EKG showed ST elevation in the later leads. Patient was brought to  [facility] and taken directly to [facility] where he had a coronary angiogram revealing 100% occluded mLAD s/p thrombectomy and JAMES, 80% LCx lesion s/p JAMES, 100% occluded OM2 s/p JAMES. An IABP, cooling cath and arterial lines placed and patient was admitted to the ICU for further management.    Interval History:  No bleeding from IABP site overnight.Seizure activity overnight, propofol increased from 40 to 50    Today's Plan:  - ongoing seizure management per Neurocritical care  - ECMO decannulation today    Neurology  # Anoxic brain injury    # Status Myoclonus  Intubated, sedated. Cooled to 33 degrees. Rewarmed -. CT head without intracranial pathology. Rhythmic twitching noted  - per EEG physician, status myoclonus.  Started on keppra, fixed rate propofol  - propofol management per Neurocritical care  - keppra per Neurocritical care  - valproic acid per Neurocritical care  - wean fentanyl as able  - continue vEEG   - palliative care consult  - Neurocritical care consult    Cardiovascular  # non sustained VT after revascularization now on VA ECMO  # VF Cardiac arrest 2/2 STEMI  # CAD with STEMI s/p PCI to mLAD, LCx, OM2  # IABP in situ for shock and coronary perfusion  # Cardiomyopathy  # mural thrombus  As above, reportedly brief arrest with ROSC after 1 shock and ACLS meds.  Coronary angiogram revealing 100% occluded mLAD s/p thrombectomy and JAMES, 80% LCx lesion s/p JAMES, 100% occluded OM2 s/p JAMES. Weaned off pressors 0200 . VT storm early , now cannulated on VA ECMO. initially on amiodarone and lidocaine; lidocaine discontinued 9/15. Hbg A1c 7.5%. S/p additional PCI   - decannulation  - IABP in situ 1:1  ECMO Cares   - Flow 3.4LPM   - Sweep 0.5LPM   - FiO2 50%   - ACT goal 140-160  Pressors/inotropes/antiarrythmic:    - PO amiodarone  GDMT:   - Continue ASA 81mg and ticagrelor 90mg BID    - Hold Lipitor for now given shock liver   - Hold ACE/ARB for now given likely reduced renal fxn after arrest   - Holding beta blocker given shock    Pulmonary  # Acute hypoxemic respiratory failure requiring intubation  # aspiration pneumonia  # Rib fractures  # pl effusions  Vent Settings:  CMV 12/450/7/50%  AB.43/48/115/32  - Wean vent as able  - Daily CXR  - Serial ABGs   - Consider scheduled duonebs if signs of lung dz, currently PRN    - Peridex BID    Gastrointestinal, Nutrition  # Shock liver 2/2 cardiac arrest  LFTs down trending.  - Monitor LFTs  - bowel med to PRN given loose stool    Renal, Electrolytes  # MARIANA  # Hypoalbuminemia   # Metabolic Alkalosis  # Lactic acidosis, resolved  Cr 1.35, untrending.  I/O's: Net positive 783cc yesterday despite diuresis (60mg IV lasix x2)  - diuretic holiday given MARIANA  - Monitor urine output  - Avoid nephrotoxins    Infectious Disease  # Aspiration Pneumonia  # Leukocytosis  # Oral Packing secondary to tooth removal  WBC 22.5 today, beginning to downtrend.  Tmax 98.6  - Monitor for signs of infection  Cultures thus far:                - No growth to date  Antibiotics               - Vancomycin                 - Zosyn - present. End s/p removal of oral packing    Hematology  # Anemia, secondary to acute blood loss and critical illness  # Mural thrombus  # Thrombocytopenia, worsening  Hgb 7.7. Platelet 108. 4T score of 3 as of  today  - ASA/ticagrelor for JAMES  - follow platelets  - Heparin gtt for mural thrombus, ECMO    Endocrinology  # DM II  - insulin gtt as needed    MSK/Skin  No active issues     Pertinent Lines  Right radial arterial line  L femoral artery IABP  17F RFA  25F RFV  8F RSFA (distal reperfusion catheter)  NG  Owens  ETT  Rectal Pouch     ICU Cares:  CXR: ETT, IABP well positioned. Parenchyma with edema  Fluids/Feeds: TF at goal.  Fluids not indicated  DVT Prophylaxis: heparin ggt  GI Prophylaxis: protonix  Bowel Regimen: PRN, loose stools  Anticipated Floor Transfer: N/A    I spent 55 minute face-to-face or coordinating care of Brian D Brunner. Over 50% of our time on the unit was spent counseling the patient and/or coordinating care regarding cardiac arrest, ECMO management.    Family Update: updated by me today    Patient seen and discussed with Dr. Emily Shah.  Assessment and plan as above.    Snow Ortiz PA-C  Critical Care Cardiology  Pager: [phone]      Objective Findings:  Temp:  [98.2  F (36.8  C)-98.8  F (37.1  C)] 98.4  F (36.9  C)  Pulse:  [78-84] 83  Resp:  [11-19] 19  MAP:  [61 mmHg-103 mmHg] 67 mmHg  Arterial Line BP: ()/(45-83) 85/49  FiO2 (%):  [50 %] 50 %  SpO2:  [96 %-100 %] 98 %    I/O:  Intake/Output Summary (Last 24 hours) at 9/19/2022 0917  Last data filed at 9/19/2022 0900  Gross per 24 hour   Intake 4178.33 ml   Output 3210 ml   Net 968.33 ml     Physical Exam:  GEN: intubated. Appears comfortable  HEENT: no cranial trauma  Pulm: seemingly CTAB  Cardiac: regular rate/rhythm. No murmur, rub, gallop  GI: soft, non distended  Neuro: pupils reactive. Does not follow commands  Integument: no skin breakdown  Vascular: LE warm, well perfused      Medications:    amiodarone  400 mg Oral BID     aspirin  81 mg Per Feeding Tube Daily     levETIRAcetam  1,500 mg Intravenous Q12H     multivitamins w/minerals  15 mL Per Feeding Tube Daily     oxymetazoline  2 spray Topical Q4H      pantoprazole  40 mg Oral QAM AC     piperacillin-tazobactam  3.375 g Intravenous Q6H     polyethylene glycol  17 g Oral Daily     protein modular  2 packet Per Feeding Tube 4x Daily     senna-docusate  2 tablet Oral At Bedtime     thiamine  100 mg Oral Daily     ticagrelor  90 mg Oral or Feeding Tube BID     valproate  750 mg Intravenous Q8H       dextrose       dextrose       fentaNYL 150 mcg/hr (09/19/22 0700)     heparin (PRESSURE BAG) 2 unit/mL in 0.9% NaCl 3 mL/hr (09/19/22 0700)     HEParin 1,000 Units/hr (09/19/22 0700)     insulin regular 6 Units/hr (09/19/22 0700)     Percutaneous Coronary Intervention orders placed (this is information for BPA alerting)       Percutaneous Coronary Intervention orders placed (this is information for BPA alerting)       propofol 50 mcg/kg/min (09/19/22 0700)     BETA BLOCKER NOT PRESCRIBED       STATIN NOT PRESCRIBED           Labs:   CMP  Recent Labs   Lab 09/19/22  0647 09/19/22  0551 09/19/22  0504 09/19/22  0347 09/19/22  0341 09/18/22  2155 09/18/22  2150 09/18/22  1815 09/18/22  1607 09/18/22  0959 09/18/22  0952 09/18/22  0342 09/18/22  0335 09/17/22  0413 09/17/22  0412 09/16/22  0409 09/16/22  0351   NA  --   --   --   --  145  --  147*  --  145  --  143  --  142   < > 142   < > 138   POTASSIUM  --   --   --   --  4.1  --  3.9  --  3.8  --  4.1  --  4.0   < > 3.9   < > 3.9   CHLORIDE  --   --   --   --  107  --  107  --  107  --  105  --  106   < > 106   < > 102   CO2  --   --   --   --  33*  --  30*  --  28  --  29  --  31*   < > 31*   < > 30*   ANIONGAP  --   --   --   --  5*  --  10  --  10  --  9  --  5*   < > 5*   < > 6*   * 108* 140* 174* 184*  191*   < > 79  80   < > 127*  129*   < > 178*  180*   < > 160*  162*   < > 168*   < > 183*   BUN  --   --   --   --  42.6*  --  38.8*  --  33.9*  --  30.5*  --  31.0*   < > 27.0*   < > 22.5   CR  --   --   --   --  1.35*  --  1.38*  --  1.26*  --  1.08  --  1.07   < > 1.13   < > 1.16   GFRESTIMATED  --   --    --   --  60*  --  59*  --  65  --  79  --  79   < > 74   < > 72   JACKIE  --   --   --   --  8.6*  --  8.6*  --  8.6*  --  8.7*  --  8.7*   < > 8.5*   < > 8.1*   MAG  --   --   --   --  2.3  --  2.2  --  2.1  --  2.0  --  2.1   < > 2.3   < > 2.1   PHOS  --   --   --   --  5.0*  --   --   --   --   --   --   --  4.3  --  3.4  --  3.1   PROTTOTAL  --   --   --   --  5.3*  --  5.5*  --  5.4*  --  6.0*  --  5.7*   < > 5.5*   < > 5.4*   ALBUMIN  --   --   --   --  2.3*  --  2.4*  --  2.4*  --  2.7*  --  2.5*   < > 2.5*   < > 2.6*   BILITOTAL  --   --   --   --  0.5  --  0.8  --  0.5  --  0.6  --  0.5   < > 0.4   < > 0.5   ALKPHOS  --   --   --   --  99  --  102  --  110  --  118  --  112   < > 103   < > 82   AST  --   --   --   --  37  --  36  --  35  --  33  --  30   < > 26   < > 28   ALT  --   --   --   --  19  --  17  --  19  --  17  --  15   < > 16   < > 20    < > = values in this interval not displayed.     CBC  Recent Labs   Lab 09/19/22  0341 09/18/22  2150 09/18/22  1607 09/18/22  0952   WBC 22.5* 22.6* 21.0* 18.8*   RBC 2.68* 2.81* 2.56* 2.96*   HGB 7.7* 8.1* 7.2* 8.4*   HCT 24.0* 25.1* 22.7* 26.1*   MCV 90 89 89 88   MCH 28.7 28.8 28.1 28.4   MCHC 32.1 32.3 31.7 32.2   RDW 13.8 13.7 13.8 13.9   * 121* 125* 137*     Arterial Blood Gas  Recent Labs   Lab 09/19/22  0548 09/19/22  0343 09/19/22  0341 09/19/22  0147 09/18/22  2355   PH 7.43  --  7.40 7.41 7.39   PCO2 48*  --  50* 50* 51*   PO2 115*  --  108* 96 102   HCO3 32*  --  31* 32* 31*   O2PER 50 50  70 50 50 50         Pertinent Imaging Studies:  Coronary angiogram:     Ost RCA lesion is 50% stenosed.    Dist RCA lesion is 60% stenosed.    RPDA lesion is 60% stenosed.    3rd Diag lesion is 99% stenosed.    1st Sept lesion is 50% stenosed.    1st Mrg lesion is 100% stenosed.    2nd Diag lesion is 40% stenosed.    2nd Mrg lesion is 40% stenosed.    1st LPL lesion is 30% stenosed.  1. Patent stents in LAD and LCx/OM, no new lesions noted on diagnostic  angiogram as compared to post PCI angiogram on 9/12/22.  2. Successful ECMO cannulation at left femoral vein and artery with distal perfusion sheath.    Echo:   Biplane LVEF is 16%.  On VA ECMO at 3.8LPM.  LV apical thrombus noted.  Right ventricular function, chamber size, wall motion, and thickness are  normal.      Snow Ortiz PA-C  Critical Care Cardiology  Pager: [phone]

## 2022-09-19 NOTE — ANESTHESIA POSTPROCEDURE EVALUATION
Patient: Brian D Brunner    Procedure: Procedure(s):  REMOVAL, CANNULA, ADULT, FOR ECMO       Anesthesia Type:  No value filed.    Note:  Disposition: ICU            ICU Sign Out: Anesthesiologist/ICU physician sign out WAS performed   Postop Pain Control: Uneventful            Sign Out: Well controlled pain   PONV: No   Neuro/Psych: Uneventful            Sign Out: Acceptable/Baseline neuro status   Airway/Respiratory: Uneventful            Sign Out: AIRWAY IN SITU/Resp. Support               Airway in situ/Resp. Support: ETT                 Reason: Planned Pre-op   CV/Hemodynamics: Uneventful            Sign Out: Acceptable CV status; No obvious hypovolemia; No obvious fluid overload   Other NRE: NONE   DID A NON-ROUTINE EVENT OCCUR? No    Event details/Postop Comments:  Patient transported directly from the OR to the ICU sedated and intubated.  Vitals stable on 0.03 epi.  Requiring 100% FiO2 and PEEP 10 to maintain PaO2 in low 100s.  Report given to ICU.           Last vitals:  Vitals:    09/19/22 1315 09/19/22 1330 09/19/22 1345   BP:      Pulse: 86 85 85   Resp: 13 12 11   Temp:      SpO2: 99% 99% 98%       Electronically Signed By: Yvrose Barnes MD  September 19, 2022  6:04 PM

## 2022-09-19 NOTE — OP NOTE
DATE OF SERVICE: 9/19/2022.     PREOPERATIVE DIAGNOSES:  1.  Recent cardiac arrest.  2.  Status post femoral cannulation for veno-arterial extracorporeal membrane oxygenation (VA-ECMO).  3.  Severe multivessel coronary artery disease status post percutaneous coronary intervention.     POSTOPERATIVE DIAGNOSES:  1.  Recent cardiac arrest.  2.  Status post femoral cannulation for veno-arterial extracorporeal membrane oxygenation.  3.  Severe multivessel coronary artery disease status post percutaneous coronary intervention.     PROCEDURE PERFORMED:  Right femoral arterial and venous decannulation and primary repair of right femoral artery cannulation site with a bovine pericardial patch.     SURGEON:  Sammy Austin MD, PhD.     FIRST ASSISTANT:  Yoon Finley PA-C.       ANESTHESIA:  General endotracheal anesthesia.     ANESTHESIOLOGIST:  Kaushal Bang MD and Yvrose Tran MD.     ESTIMATED BLOOD LOSS:  750 mL.     SPECIMEN:  None.     INDICATIONS FOR PROCEDURE: Mr. Brunner is a 61 year-old man who had a cardiac arrest and was placed on femoral veno-arterial extracorporeal membrane oxygenation. He is now improved hemodynamically and able to wean off of VA-ECMO. The family of the patient understands the risks and benefits of the procedure and wishes for him to undergo the operation.     OPERATIVE FINDINGS:  The patient was hemodynamically stable and stable from a respiratory standpoint while weaning off of VA-ECMO, although he did require increased FiO2 and increased peak inspiratory pressure. Bleeding from the right femoral vein was controlled with suture ligature. Hemostasis of the femoral vessels was noted. A distal femoral artery triphasic doppler signal was present after repair of the femoral artery.     OPERATIVE DESCRIPTION IN DETAIL:  After obtaining informed consent, the patient was brought to the operating room and placed in the supine position on the operating room table.   A central venous line  was placed by the anesthesiologist, and all other appropriate lines and devices for monitoring were already in place.  The patient underwent smooth induction of general anesthesia.       The right femoral artery and vein were exposed through an oblique femoral incision that was extended vertically both proximally and distally.  The distal perfusion cannula was exposed through a separate vertical incision over the right femoral vein.     After weaning from VA-ECMO, the femoral venous cannula was clamped and removed as the pursestring suture was tied down and cut. Bleeding from the femoral vein was quite extensive and this was controlled with two figure-of-eight 4-0 Prolene sutures and a pledgeted simple mattress 5-0 Prolene suture.     After obtaining proximal and distal control of the common femoral artery, the femoral arterial cannula was removed and the femoral arteriotomy was repaired primarily with a bovine pericardial patch sewn to the edges of the arteriotomy with running 6-0 Prolene suture. The distal perfusion cannula was removed and this arteriotomy was closed with a 6-0 Prolene simple mattress suture.     Hemostasis was achieved. The wound was closed in layers with absorbable suture in the fascia and subdermal tissue and 3-0 Nylon vertical mattress sutures in the skin, and a sterile dressing was applied.  All sponge, needle and instrument counts were correct at the end of the case.        BHAVIN RAYMUNDO MD

## 2022-09-19 NOTE — PROGRESS NOTES
[facility]: [place]  NeuroCritical Care Progress Note    Patient Name:  Brian D Brunner  MRN:  [id number]    :  1960  Date of Service:  2022  Primary care provider:  No primary care provider on file.      Assessment/Plan:  Brian D Brunner is a 61 year old male with a unknown past medical history of admitted on 2022 for Cardiac arrest.  pt was being warmed, rearrested and was cannulated for VA ECMO. NCC consulted for concerns for seizures.     24 hour updates: Propofol increased , on Keppra and VPA. Nursing staff noted repetitive LLE jerking with cessation of movement after Propofol increased. Primary team planning decannulation today.     vEEG :  Study is severely abnormal.  1) background activity consistent with severe diffuse encephalopathy.  Findings may in part be related to sedative drips employed but are more severe than would be expected with the doses reported.  2) Persistent generalized spikes and polyspikes with persistence exceeding 50% at times.  These respond somewhat to minor increase in propofol drip but then recur.  They are not associated with obvious clinical changes.  At times, findings approach standard criteria for nonconvulsive status epilepticus.    NEURO RECS  - Continue vEEG   - Increase Keppra to 2 grams BID (ordered)  - Increase VPA to 1 gram every 8 hours (ordered)  - Propofol drip 50 mcg/kg/minute set rate, no titration (ordered)  - Add midazolam 4 mg IV bolus x 1 then midazolam drip 2 mg/hour (CSI will order)  - Repeat CT when able  - MRI would be ideal when patient's condition allows   - We will continue to follow and monitor their EEG and neurologic exam, please call #[phone] with any questions    The patient was seen and discussed with NCC attending, Dr. Dong, and he is in agreement with the above assessment, plan, and recommendations.     VLADIMIR Donaldson, CNP  Neurocritical Care *[id number]    Physical Examination:   BP  "108/61   Pulse 83   Temp 98.4  F (36.9  C)   Resp 19   Ht 1.702 m (5' 7\")   Wt 100.2 kg (220 lb 14.4 oz)   SpO2 98%   BMI 34.60 kg/m    General: Adult male patient, lying in bed, critically-ill  HEENT: Normocephalic, atraumatic, no icterus, oral cavity/oropharynx pink and moist, ETT present.   Cardiac: RRR on bedside monitor, ECMO, IABP  Pulm: Synchronous with ventilator.   Abdomen: Non-distended.   Extremities: Appears adequately perfused.   Skin: No obvious rashes or lesions on exposed skin.   Psych: Sedated  Neuro:  Pt is deeply sedated. Pupils equal and reactive on pupillometry per nursing exam this AM. Pt does not open eyes or follow commands.    I have personally reviewed all labs and imaging for this patient.    "

## 2022-09-20 NOTE — PROGRESS NOTES
CLINICAL NUTRITION SERVICES - REASSESSMENT NOTE     Nutrition Prescription    RECOMMENDATIONS FOR MDs/PROVIDERS TO ORDER:  -Daily fluid adjustment per MD    Malnutrition Status:    Patient does not meet two of the established criteria necessary for diagnosing malnutrition    Recommendations already ordered by Registered Dietitian (RD):  -Decrease additional protein packets: TwoCal HN @ 40 mL/hr (960 mL/day) +2 pkt Prosource TID to provide 2160 kcals (27 kcal/kg/day), 147 g PRO (1.9 g/kg/day), 672 mL H2O, 210 g CHO and 5 g Fiber daily.  -Continue daily MVI  -Continue 100 mg thiamine daily     Future/Additional Recommendations:  -Continue to monitor TF tolerance/adequacy  -Continue to monitor labs, stool output, weight trends      EVALUATION OF THE PROGRESS TOWARD GOALS   Diet: NPO  Nutrition Support: Pt has been tolerating TF at goal rate.     9/15-current: TwoCal HN @ 40 mL/hr (960 mL/day) + 2 pkt Prosource QID to provide 2240 kcals (28 kcal/kg/day), 169 g PRO (2.1 g/kg/day), 672 mL H2O, 210 g CHO and 5 g Fiber daily.     Pt received an average of 525 ml TF/d over the past 5 days + an average of 6 pkt Prosource daily. This provided an average of 1290 kcal/d (16 kcal/kg) and 110 g protein/d (1.4 g/kg). This met 65% estimated energy needs and 69% estimated protein needs.      NEW FINDINGS   Pt remains intubated. Decannulated from VA ECMO 9/19.     GI: LBM 9/17 (loose). Holding bowel regimen.     Labs: Reviewed - hypernatremia, hypermagnesia    Medications: Reviewed    Weights: No weight loss over the past week.  09/20/22 0130 101.9 kg (224 lb 10.4 oz)   09/19/22 0400 100.2 kg (220 lb 14.4 oz)   09/18/22 0400 101.7 kg (224 lb 3.3 oz)   09/17/22 0000 101.5 kg (223 lb 12.3 oz)   09/15/22 0400 102.5 kg (225 lb 15.5 oz)   09/12/22 2100 98.5 kg (217 lb 2.5 oz)     MALNUTRITION  % Intake: < 75% for > 7 days (moderate)  % Weight Loss: None noted  Subcutaneous Fat Loss: None observed  Muscle Loss: None observed  Fluid  Accumulation/Edema: Does not meet criteria (trace)  Malnutrition Diagnosis: Patient does not meet two of the established criteria necessary for diagnosing malnutrition    Previous Goals   Diet adv v nutrition support within 2-3 days  Evaluation: Met    Previous Nutrition Diagnosis  Inadequate oral intake related to intubation as evidenced by NPO status.   Evaluation: No change    CURRENT NUTRITION DIAGNOSIS  Inadequate oral intake related to intubation as evidenced by NPO status.      INTERVENTIONS  Implementation  - Updated protein needs now that pt has been de-cannulated - Estimated Protein Needs: 120-160 grams protein/day (1.5 - 2 grams of pro/kg)   - Enteral Nutrition - continue as ordered  - Multi-trace element supplement therapy  - Multivitamin/mineral supplement therapy    Goals  Total avg nutritional intake to meet a minimum of 25 kcal/kg and 1.5 g PRO/kg daily (per dosing wt 79 kg).    Monitoring/Evaluation  Progress toward goals will be monitored and evaluated per protocol.    Blanca Delarosa, MS, RD, LD  4E (CVICU) RD pager: [phone]  Ascom: [phone]  Weekend/Holiday RD pager: [phone]

## 2022-09-20 NOTE — PROGRESS NOTES
[facility]: [place]  NeuroCritical Care Progress Note    Patient Name:  Brian D Brunner  MRN:  [id number]    :  1960  Date of Service:  2022  Primary care provider:  No primary care provider on file.    Assessment/Plan:  Brian D Brunner is a 61 year old male with a unknown past medical history of admitted on 2022 for Cardiac arrest.  pt was being warmed, rearrested and was cannulated for VA ECMO. NCC consulted for concerns for seizures.     24 hour updates: Just before 4 AM nursing staff noted 10 minutes of rhythmic movement of left big toe and lip quivering which seemed to correlate to vEEG spikes and resolved with bolus and increased drip rate of midazolam.     vEEG :  Study consistent with severe diffuse encephalopathy.  Degree of background suppression more intense than would be expected with the sedative drips at levels employed.  Runs of generalized epileptiform activity continue which at times meet criteria for nonconvulsive seizures.  Last such seizure occurred between 3 and 4 AM today and appeared to respond to modest increase in midazolam drip.    CT head :  Impression:  1. No acute intracranial pathology.   2. Unchanged mucosal thickening throughout the paranasal sinuses.    NEURO RECS  - Continue vEEG   - Continue Keppra to 2 grams BID (ordered)  - Continue VPA to 1 gram every 8 hours (ordered)  - Propofol drip 50 mcg/kg/minute set rate, no titration (ordered)  - Continue midazolam 4 mg IV bolus x 1 then midazolam drip 4 mg/hour   - MRI when patient's condition allows   - We will continue to follow and monitor their EEG and neurologic exam, please call #[phone] with any questions    The patient was seen and discussed with NCC attending, Dr. Dong, and he is in agreement with the above assessment, plan, and recommendations.     VLADIMIR Donaldson, CNP  Neurocritical Care *[id number]    Physical Examination:   /64   Pulse 85   Temp 98.6  F  "(37  C)   Resp 16   Ht 1.702 m (5' 7\")   Wt 101.9 kg (224 lb 10.4 oz)   SpO2 97%   BMI 35.18 kg/m    General: Adult male patient, lying in bed, critically-ill  HEENT: Normocephalic, atraumatic, no icterus, oral cavity/oropharynx pink and moist, ETT present.   Cardiac: RRR on bedside monitor, IABP  Pulm: Synchronous with ventilator.   Abdomen: Non-distended.   Extremities: Appears adequately perfused.   Skin: No obvious rashes or lesions on exposed skin.   Psych: Sedated  Neuro:  Pt is deeply sedated. Pupils equal and reactive on pupillometry per nursing exam this AM.     I have personally reviewed all labs and imaging for this patient.    "

## 2022-09-20 NOTE — PROGRESS NOTES
[facility], Procedure Note           Intra-Aortic Balloon Pump Discontinuation:       Lisandro Aguilarner  MRN# [id number]   September 20, 2022, 1:02 PM             IABP discontinued: September 20, 2022, 12:36 PM   Removed by: Rosalinda Bernabe CNP   Catheter saved: No      Recorded by Vito Espinoza

## 2022-09-20 NOTE — PLAN OF CARE
Goal Outcome Evaluation:    Plan of Care Reviewed With: patient     Overall Patient Progress: no change    Major Shift Events:  Tmax 37.8. WBC increased and lactic peaked at 3.5. Provider notified and blood cultures sent. Lactic now 2.9. Multiple seizure activity seen on EEG. Neuro-crit paged at 0340 due to seizure activity lasting over 10 minutes along with rhythmic movement of L big toes and lip quivering. Provider at bedside to examine. Versed gtt increased to 4mg/hr and 4mg bolus given. RASS -5. Cough reflex intact. Pupils equal and reactive. Epi gtt off and on throughout the night to maintain MAP >65. SR. IABP 1:1 100% augmented with no alarms. ABG pH on 0400 labs 7.61. MD notified and RR decreased to 16 and Tv decreased to 400. PEEP also decreased to 8. Repeat gas improved. TF turned back on at goal rate of 40mL/hr. Adequate urine output.     Plan: Head CT this am. Pull IABP. Monitor seizure activity.     For vital signs and complete assessments, please see documentation flowsheets.

## 2022-09-20 NOTE — PLAN OF CARE
Major Shift Events: Pt sedated with RASS goal of -5 on versed and propofol. No seizure like activity noted. Pt unresponsive to stimuli. EEG in place. CT head completed. NSR on telemetry. MAP >65 on and off epi today. IABP pulled out at 1230. Tolerated well. CMV vent settings. Pt overbreathing the vent at times. ABG stable. Heparin gtt restarted this evening. Lasix given for diuresis. Robust response. Good UOP all day via jo. TF at goal through OG. No BM. Brother at bedside this afternoon.     Plan: Monitor seizure activity per nerocrit.     For vital signs and complete assessments, please see documentation flowsheets.

## 2022-09-20 NOTE — PLAN OF CARE
Goal Outcome Evaluation:    Plan of Care Reviewed With: other (see comments) (unable to discuss with pt at this time)     Overall Patient Progress: no change    Outcome Evaluation: Pt did not meet nutrition needs x 5 days. TF currently running at goal rate.

## 2022-09-20 NOTE — PROGRESS NOTES
EEG CLINICAL NEUROPHYSIOLOGY INTERIM REPORT    EEG through 9 AM today reviewed.  Fentanyl 150 mcg, propofol 50 mcg/kg/min, midazolam increased to 4mg/h approximately 4 AM today.  Discontinuous burst suppression pattern predominates.  EEG is unreactive.  Runs of generalized polyspike continue.  Persistence of epileptiform activity during these sometimes exceeds 50%.  Durations ranged from 20 to as long as 70 minutes.  No clear clinical activity obvious on video.  Staff however does alexis movements on 3 occasions during this recording and each of these occurred during the run of generalized spike-wave.  Last such run occurred between 3 AM and 4 AM and appeared to respond to increase in midazolam dose.  A burst suppression pattern has continued since then.    Study consistent with severe diffuse encephalopathy.  Degree of background suppression more intense than would be expected with the sedative drips at levels employed.  Runs of generalized epileptiform activity continue which at times meet criteria for nonconvulsive seizures.  Last such seizure occurred between 3 and 4 AM today and appeared to respond to modest increase in midazolam drip.    Discussed with neuro crit service.    Miguel Angel Joiner MD  Contact information for physicians covering Epilepsy and EEG is available on [facility].  Click search, enter neurology adult/[facility] in group name box, click on neurology adult/[facility], then click Staff Epilepsy and EEG.

## 2022-09-20 NOTE — PROGRESS NOTES
CVTS Brief Note    Following femoral incision post ECMO decannulation. Retention sutures in place, ecchymotic. Small amount of edema, stable. Continue to keep clean and dry. Will remove sutures on 9/29.    VLADIMIR Aj, ACNPC-AG, CCRN  Nurse Practitioner  Cardiothoracic Surgery  Pager: [phone]

## 2022-09-21 NOTE — PROGRESS NOTES
Remains at RASS -5, propofol, midazolam and fentanyl infusing. EEG monitoring ongoing. Intact cough reflex. MAPs stable on minimal dose norepinephrine. SR/ST 90s-100s. Less ectopy as morning progressed. Vent down to 30% FiO2, titrated per blood gases. TFs at goal, tolerating well. Glucose 100-150.    Transferred to  at 1445, report given to oncoming RN. Plan is for MRI this afternoon.

## 2022-09-21 NOTE — PROGRESS NOTES
[facility]: [place]  NeuroCritical Care Progress Note    Patient Name:  Brian D Brunner  MRN:  [id number]    :  1960  Date of Service:  2022  Primary care provider:  No primary care provider on file.    Assessment/Plan:  Brian D Brunner is a 61 year old male with a unknown past medical history of admitted on 2022 for Cardiac arrest.  pt was being warmed, rearrested and was cannulated for [facility] ECMO. NCC consulted for concerns for seizures.     24 hour updates: Continues to have electrographic concern for seizure activity, no clinical signs of seizure.     vEEG :  Study continues Severely abnormal.  1) a burst suppression pattern is seen.  An estimated 60% of ongoing recording reveals a significant suppression of the background activity.  These findings are consistent with a severe diffuse encephalopathy.  Findings may in part be related to anesthetic drips; however, findings are more severe than usually seen in people treated with sedative drips at the doses reported.  2) intermittent generalized epileptiform activity noted indicating generalized seizure tendency.  3) electrographic seizures continue, up to 40 minutes in duration.  Four such seizures were recorded during the study.    CT head :  Impression:  1. No acute intracranial pathology.   2. Unchanged mucosal thickening throughout the paranasal sinuses.    NEURO RECS  - Continue vEEG   - Increase Onfi to 10 mg BID, additional 5 mg ordered this AM x 1 dose (ordered)  - Continue Keppra to 2 grams BID   - Continue VPA to 1 gram every 8 hours   - Decrease Propofol drip to 25 mcg/kg/minute set rate, no titration (ordered)  - Increase midazolam to 6 mg/hour (ordered)   - MRI with and without contrast when patient's condition allows (ordered  by primary team)   - We will continue to follow and monitor their EEG and neurologic exam, please call #[phone] with any questions    The patient was seen and  "discussed with NCC attending, Dr. Dong, and he is in agreement with the above assessment, plan, and recommendations.     Shelby Neri, VLADIMIR, CNP  Neurocritical Care *[id number]    Physical Examination:   /64   Pulse 91   Temp 98.8  F (37.1  C)   Resp 16   Ht 1.702 m (5' 7\")   Wt 103 kg (227 lb 1.2 oz)   SpO2 98%   BMI 35.56 kg/m    General: Adult male patient, lying in bed, critically-ill.  HEENT: Normocephalic, atraumatic, no icterus, oral cavity/oropharynx pink and moist, ETT present.   Cardiac: RRR on bedside monitor.  Pulm: Synchronous with ventilator.   Abdomen: Non-distended.   Extremities: Appears adequately perfused.   Skin: No obvious rashes or lesions on exposed skin.   Psych: Sedated  Neuro:  Pt is deeply sedated. Pupils equal and reactive.     I have personally reviewed all labs and imaging for this patient.    "

## 2022-09-21 NOTE — PROGRESS NOTES
EEG CLINICAL NEUROPHYSIOLOGY PRELIMINARY REPORT    Video EEG is through 6 AM today reviewed.  Midazolam 4 mg/h, propofol 50 mcg/kg/min, fentanyl 150 mcg/h.  Continues with atypical burst suppression pattern; estimated 60% of ongoing recording with a severe suppression.  Intermittent generalized epileptiform discharges.  For lengthy runs of generalized spikes and polyspikes in which epileptiform activity exceeds 50% of ongoing recording and which meet usual criteria for electrographic seizures.  No clear video changes during these.    Study continues Severely abnormal.  1) a burst suppression pattern is seen.  An estimated 60% of ongoing recording reveals a significant suppression of the background activity.  These findings are consistent with a severe diffuse encephalopathy.  Findings may in part be related to anesthetic drips; however, findings are more severe than usually seen in people treated with sedative drips at the doses reported.  2) intermittent generalized epileptiform activity noted indicating generalized seizure tendency.  3) electrographic seizures continue, up to 40 minutes in duration.  Four such seizures were recorded during the study.    Full report in chart.  Results discussed with neuro crit.    Miguel Angel Joiner MD  Contact information for physicians covering Epilepsy and EEG is available on [facility].  Click search, enter neurology adult/[facility] in group name box, click on neurology adult/[facility], then click Staff Epilepsy and EEG.

## 2022-09-21 NOTE — PROGRESS NOTES
Cardiology ICU Daily Progress     Patient Name: Brian D Brunner  Medical Record Number: [id number]  YOB: 1960  PCP: No primary care provider on file.         Chief Complaint:   Brian Brunner is a 61 year old male with unknown PMHx who was admitted on 9/12/2022 following an OOHCA         History of Present Illness:   In brief review, pt was found by a co-worker to be hanging from a forklift unresponsive. Patient was lowered to the ground, CPR started, 911 was called, AED was placed and patient received 1 shock, along with 2 mg of epinephrine and 300 mg of Amiodarone with ROSC. EKG showed ST elevation in the later leads. Patient was brought to  [facility] and taken directly to [facility] where he had a coronary angiogram revealing 100% occluded mLAD s/p thrombectomy and JAMES, 80% LCx lesion s/p JAMES, 100% occluded OM2 s/p JAMES. An IABP, cooling cath and arterial lines placed and patient was admitted to the ICU for further management.    Cardiac risk factors: unknown PMHx  Last Echocardiogram 9/18: ECMO turndown from 3.1 l/m to 1.3 l/m, and IABP  Left ventricular function is decreased. The ejection fraction is 15-20%  (severely reduced). Apical akinesis with an apical thormbus present.  Global right ventricular function is normal. Trivial pericardial effusion is present.  Last Catheterization 9/16: Multivessel coronary disease status post prior PCI to the LAD, OM3, and mid LCx with patent stents in those areas. PCI s/p JAMES x1 to ostial RCA, JAMES x1 to mid LCx, JAMES x1 to distal LCx, and DESx1 to OM1  EKG 9/20: SR 90's, no signs of acute ischemia, QTc  0.472       Assessment and Plan:   Assessment:   Continued seizures over the night despite multiple antiepileptic medications  Plan:  - ongoing seizure management per Neurocritical care  -MRI   -Family conference     Assessment and Plan by System:  Neurology  # Anoxic brain injury    # Status Myoclonus  Intubated, sedated. Cooled to 33 degrees. Rewarmed 9/13-9/14. CT  head without intracranial pathology. Rhythmic twitching noted 9/16 - per EEG physician, status myoclonus.  Started on keppra, fixed rate propofol, fent, versed, valproic acid  -vEEG 9/20: Study consistent with severe diffuse encephalopathy.  Degree of background suppression more intense than would be expected with the sedative drips at levels employed.  Runs of generalized epileptiform activity continue which at times meet criteria for nonconvulsive seizures.  Last such seizure occurred between 3 and 4 AM today and appeared to respond to modest increase in midazolam drip.  Fent 150  Versed 4  Prop 50  Plan:  - propofol management per Neurocritical care  - keppra 2000 mg BID per Neurocritical care  - valproic acid 1000 mg Q8 hours per Neurocritical care  - onfi 5 mg  BID  - wean fentanyl as able  - continue vEEG   -MRI   - palliative care consult  - Neurocritical care consult  Cardiovascular  # non sustained VT after revascularization now on VA ECMO  # VF Cardiac arrest 2/2 STEMI  # CAD with STEMI s/p PCI to mLAD, LCx, OM2  # IABP in situ for shock and coronary perfusion  # Cardiomyopathy  # mural thrombus  Lactic 1.4, liver fx normal, CR stable, Good UO  As above, reportedly brief arrest with ROSC after 1 shock and ACLS meds. Coronary angiogram revealing 100% occluded mLAD s/p thrombectomy and JAMES, 80% LCx lesion s/p JAMES, 100% occluded OM2 s/p JAMES. Weaned off pressors 0200 9/13. VT storm early 9/14, now cannulated on VA ECMO. initially on amiodarone and lidocaine; lidocaine discontinued 9/15. Hbg A1c 7.5%. S/p additional PCI 9/16  - 9/20 IABP removal  -LV thrombus  Plan:  -Continue PO Amiodarone     CAD Management:  - DAPT: Continue ASA 81mg and ticagrelor 90mg BID  Goal Directed Medical Therapy:  -ARNI: Hold given likely reduced renal fxn after arrest  - Statin: Hold Lipitor for now given shock liver  - BB: Hold due to cardiogenic shock  -MRA Hold due to likely reduced renal fx after arrest  -SGLT2i  Hold due to  likely reduced renal fx after arrest  Pulmonary  # Acute hypoxemic respiratory failure requiring intubation  # aspiration pneumonia  # Rib fractures  # pl effusions  -O2 Delivery: Vent Mode: CMV/AC  (Continuous Mandatory Ventilation/ Assist Control)  FiO2 (%): 40 %  Resp Rate (Set): 16 breaths/min  Tidal Volume (Set, mL): 400 mL  PEEP (cm H2O): 8 cmH2O  Resp: 18    -CXR 9/21: clear, ETT 2 cn above lexy  ABG:   - Wean vent as able  - Daily CXR  - Serial ABGs   - Consider scheduled duonebs if signs of lung dz, currently PRN    - Peridex BID  Gastrointestinal, Nutrition  # Shock liver 2/2 cardiac arrest  LFTs down trending.  - Monitor LFTs  - bowel meds  -Nutrition: twp freddy HN 40 ml hour  -BM: 9/  Plan:  - bowel meds  Renal, Electrolytes  # MARIANA  # Hypoalbuminemia   # Metabolic Alkalosis  # Lactic acidosis, resolved  I/O's: +1000 yesterday   CO2 31  Plan:  -diuretic holiday   -FWF 60 ml every 2 hours  - Monitor urine output  - Avoid nephrotoxins  Hematology  # Anemia, secondary to acute blood loss and critical illness  # Mural thrombus  # Thrombocytopenia, worsening  - ASA/ticagrelor for JAMES  -Heparin gtt for LV thrombus  - follow platelets  Endocrinology  # DM II  -191  8-10 units of insulin an hour  - insulin gtt as needed  Infectious Disease  # Aspiration Pneumonia  # Leukocytosis  # Oral Packing secondary to tooth removal  WBC 34.5 (41.3) today, beginning to downtrend.  Tmax 100.1, CRP trending down 248  - Monitor for signs of infection  Cultures thus far:                - No growth to date  Antibiotics               - Vancomycin 9/13                - Zosyn 9/13- present. End s/p removal of oral packing  MSK/Skin  -L groin ECMO site with sutures. No hematoma, no oozing, distal pulses palpable and equal  -R groin IABP site. No hematoma, no oozing, distal pulses palpated and equal  -9/19:Oral packing removed by ENT  Plan:  -monitor old line sites for signs of infection    ICU Cares:  CXR: clear, devices  "in appropriate position  Fluids/Feeds:  TF  DVT Prophylaxis: heparin infusion  GI Prophylaxis: protonix  Bowel Regimen: senna and miralax  Anticipated Floor Transfer: NA  Lines/Tubes/Drains:  PIV X2  Arterial line  RIJ quad lumen CVC  OG  Owens  ETT    Code Status: FULL    Family updated by me    Patient seen, discussed, and plan reviewed with     Time Spent on this Encounter   Lisandro was seen and evaluated by me on 09/20/22.  He was in critical condition as the result of refractory VT/VF arrest, cardiogenic shock.    His condition is now Critical.      The acute issues managed by me today include  Removal of IABP,   Supportive interventions provided and/or ordered by me include fluid status, cardiogenic shock, oxygenation     Total Critical Care time spent by me, excluding procedures, was 104 minutes.    VLADIMIR Lloyd CNP Over 50% of our time on the unit was spent counseling the patient and/or coordinating care regarding cardiac arrest.    Rosalinda Bernabe DNP APRN CNP  [facility] Cardiology  Pager [phone]           Review of Systems:   Negative except noted above         Allergies:   Not on File            Physical Exam:   Vitals were reviewed.  Blood pressure 105/64, pulse 93, temperature 99  F (37.2  C), resp. rate 18, height 1.702 m (5' 7\"), weight 103 kg (227 lb 1.2 oz), SpO2 97 %.    General: In bed, in NAD  HEENT: PERRL, no scleral icterus or injection  CARDIAC: RRR, no m/r/g appreciated. Peripheral pulses dopplered  RESP: Mechanical ventilation; CTAB, no wheezes, rhonchi or crackles appreciated.  GI: Soft, non-tender, BS+, no masses appreciated  : Owens  EXTREMITIES: No LE edema, pulses 2+. Femoral access site w/o bleeding, dressing c/d/i. No bruits appreciated.   SKIN: No acute lesions appreciated  NEURO: Intubated and sedated           Data:   CMP  Recent Labs   Lab 09/21/22  0609 09/21/22  0502 09/21/22  0403 09/21/22  0357 09/21/22  0004 09/20/22  2211 09/20/22  2208 09/20/22 2117 " 09/20/22  1653 09/20/22  1559 09/20/22  1055 09/20/22  0955 09/20/22  0359 09/20/22  0353 09/19/22  0347 09/19/22  0341 09/18/22 0342 09/18/22  0335   NA  --   --   --  151*  --  152*  --  149*  --  149*  --  148*  --  145   < > 145   < > 142   POTASSIUM  --   --   --  4.3  --  3.7  --  3.8  3.8  --  4.4  --  3.8  --  3.6   < > 4.1   < > 4.0   CHLORIDE  --   --   --  114*  --  114*  --  112*  --  113*  --  112*  --  110*   < > 107   < > 106   CO2  --   --   --  31*  --  28  --  29  --  26  --  30*  --  25   < > 33*   < > 31*   ANIONGAP  --   --   --  6*  --  10  --  8  --  10  --  6*  --  10   < > 5*   < > 5*   * 126* 146* 141*   < > 113*   < > 122*   < > 128*  131*   < > 116*  118*   < > 165*  171*   < > 184*  191*   < > 160*  162*   BUN  --   --   --  53.2*  --  53.1*  --  54.0*  --  52.7*  --  51.7*  --  51.7*   < > 42.6*   < > 31.0*   CR  --   --   --  1.03  --  1.13  --  1.13  --  1.13  --  1.15  --  1.12   < > 1.35*   < > 1.07   GFRESTIMATED  --   --   --  83  --  74  --  74  --  74  --  73  --  75   < > 60*   < > 79   JACKIE  --   --   --  8.3*  --  8.1*  --  8.2*  --  8.4*  --  8.4*  --  8.5*   < > 8.6*   < > 8.7*   MAG  --   --   --  2.3  --   --   --  2.3  --  2.4*  --  2.4*  --  2.3   < > 2.3   < > 2.1   PHOS  --   --   --  3.7  --   --   --   --   --   --   --   --   --  2.6  --  5.0*  --  4.3   PROTTOTAL  --   --   --  5.2*  --  5.0*  --  5.1*  --  4.7*  --  4.7*  --  4.7*   < > 5.3*   < > 5.7*   ALBUMIN  --   --   --  2.5*  --  2.3*  --  2.3*  --  2.1*  --  2.1*  --  2.1*   < > 2.3*   < > 2.5*   BILITOTAL  --   --   --  0.2  --  0.2  --  0.3  --  0.3  --  0.3  --  0.3   < > 0.5   < > 0.5   ALKPHOS  --   --   --  102  --  99  --  101  --  99  --  91  --  94   < > 99   < > 112   AST  --   --   --  35  --  37  --  36  --  32  --  34  --  37   < > 37   < > 30   ALT  --   --   --  15  --  18  --  16  --  15  --  16  --  17   < > 19   < > 15    < > = values in this interval not displayed.      CBC  Recent Labs   Lab 09/21/22  0357 09/20/22  2211 09/20/22  1559 09/20/22  0955   WBC 29.3* 41.4* 27.7* 35.5*   RBC 2.32* 2.57* 2.52* 2.64*   HGB 6.9* 7.5* 7.4* 7.9*   HCT 21.2* 23.2* 22.7* 23.6*   MCV 91 90 90 89   MCH 29.7 29.2 29.4 29.9   MCHC 32.5 32.3 32.6 33.5   RDW 14.6 14.6 14.4 14.0    201 144* 152     INR  Recent Labs   Lab 09/20/22  1559 09/20/22  0955 09/20/22  0353 09/19/22  2359   INR 1.17* 1.18* 1.28* 1.24*     Arterial Blood Gas  Recent Labs   Lab 09/21/22  0357 09/21/22  0005 09/20/22 2211 09/20/22  1559   PH 7.39 7.40 7.40 7.41   PCO2 49* 47* 49* 47*   PO2 129* 162* 72* 123*   HCO3 30* 30* 30* 30*   O2PER 50 50 50  50 50     Lipids  Recent Labs   Lab 09/16/22  0351   CHOL 78   HDL 40   LDL 24     TSHNo lab results found in last 7 days.  [id number] lab results found in last 7 days.  TroponinInvalid input(s): TROP    Imaging/procedure results:  XR Chest Port 1 View  Narrative: EXAM: XR CHEST PORT 1 VIEW  9/20/2022 1:30 AM     HISTORY:  IABP and ETT placement       COMPARISON:  Chest x-ray 9/19/2022    FINDINGS: AP radiograph of the chest. Endotracheal tube projecting 1.9  cm above the lexy. Intra-aortic balloon pump superior marker at the  level of the lexy. New right IJ central catheter with tip overlying  the brachiocephalic confluence. Partially visualized enteric tube  coursing below the diaphragm and inferior to the field of view.    Stable cardiomediastinal silhouette. Coronary artery stents. Low lung  volumes although with improved aeration of the left lower lung zone.  Indistinct pulmonary vasculature with hazy perihilar opacities.  Question small right pleural effusion. No significant left pleural  effusion. No pneumothorax. Upper abdomen is unremarkable.  Impression: IMPRESSION:  1. New right IJ central venous catheter with tip overlying the  brachiocephalic confluence. Stable positioning of the endotracheal  tube and intra-aortic balloon pump.  2. Overall, improved  aeration of the left lower lung zone.  3. Continued low lung volumes with hazy perihilar opacities, likely  atelectasis versus pulmonary edema.    I have personally reviewed the examination and initial interpretation  and I agree with the findings.    LINNETTE SAM MD         SYSTEM ID:  [id number]  CT Head w/o Contrast  Narrative: CT HEAD W/O CONTRAST 2022 9:03 AM    History: concern for anoxia. seizures despite antiepileptics.  cardiac  arrest     Comparison: 2022    Technique: Using multidetector thin collimation helical acquisition  technique, axial, coronal and sagittal CT images from the skull base  to the vertex were obtained without intravenous contrast.   (topogram) image(s) also obtained and reviewed.    Findings: There is no intracranial hemorrhage, mass effect, or midline  shift. Gray/white matter differentiation in both cerebral hemispheres  is preserved. Ventricles are proportionate to the cerebral sulci. The  basal cisterns are clear. Mild diffuse cerebral volume loss.    The bony calvaria and the bones of the skull base are normal. Mucosal  thickening throughout the bilateral maxillary, ethmoid, and sphenoid  sinuses. Hypoplastic frontal sinuses. Mastoid air cells are clear..  Ben cisterna magna  Impression: Impression:  1. No acute intracranial pathology.   2. Unchanged mucosal thickening throughout the paranasal sinuses.    I have personally reviewed the examination and initial interpretation  and I agree with the findings.    ASHLEY GONZALEZ MD         SYSTEM ID:  [id number]  EEG Video 12-26 hr Unmonitored  EEG Video 12-26 hr Unmonitored Result    VIDEO EEG DATE: 22  VIDEO EEG [id number]  VIDEO EEG DAY#: 7  VIDEO EEG SOURCE FILE DURATION: 19 hours and 38 minutes    PATIENT HISTORY: 61-year old status postcardiac arrest.  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  He is currently being   treated with levetiracetam, valproic acid.  Intubated and  encephalopathic   in the ICU.  Sedative drips include fentanyl 150mcg/h, midazolam 2 mg/h,   propofol 50 mcg/kg/min.    TECHNICAL SUMMARY: This is a video-EEG monitoring study. EEG was recorded   from 23 scalp electrodes placed according to the 10-20 international   system. Additional electrodes were utilized for referencing, artifact   detection, and recording from other cerebral regions. Qualified   technicians attached EEG electrodes, set up the study, monitored and   reviewed EEG recordings, and disconnected EEG electrodes when appropriate.   Video was continuously recorded. Video was reviewed for clinical   correlation and to assist with EEG interpretations.     BACKGROUND ACTIVITY: During the majority of the tracing, and a typical   burst suppression pattern is seen.  Runs of 40 to 50  V polymorphic beta   and delta up to 3 seconds in duration alternate with periods of intense   attenuation in which little electrocerebral activity is seen at   sensitivities of 2  V/mm.  Persistence of suppressed periods during these   portions of the recording is around 50%.  During other portions of the   recording, abundant epileptiform activity is seen which at times   approaches standard definitions of nonconvulsive status epilepticus (see   below).    ACTIVATION PROCEDURE: Stimulation performed utilizing ICU protocol at   around 8:45AM on 9/19.  This does not result in any changes in   electrocerebral activity.    OTHER INTERICTAL ABNORMALITIES: See below.    ICTAL ABNORMALITIES: Runs of generalized polyspike are noted lasting about   1/2-hour or so.  Persistence of generalized polyspike exceeds 50% at times   but this waxes and wanes.  These portions of the recording last half hour   or so.  They occur around 9 AM, 11 AM, 6 PM, and at 10 PM on 9/20/2022 and   again at around 1 AM on 9/21/2022.  Staff marked irregular twitching at   around 9 AM and again 11 AM (x2) during portions of the recording in which   generalized  polyspike predominates.  Review of video does not show obvious   movements and certainly does not show that the movements are time locked   to generalized polyspike bursts.  However, not all of patient's body is   above covers and the movements may have been subtle.    IMPRESSION: Severely abnormal.  1) background activity consistent with a severe diffuse encephalopathy.    This may in part be related to sedative drips employed.  However, extent   of EEG changes greater than typically seen with sedative drips at the   doses reported.  2) lengthy runs of generalized polyspike with persistence exceeding 50%.    These at times approach standard criteria for nonconvulsive status   epilepticus.  5 periods of prolonged or generalized spike-wave up to half   an hour in duration recorded.  Staff reported some unusual movements   during these.  These were not really appreciated on video though not all   of patient's body was on camera.    Miguel Angel Joiner MD  EPILE[facility] STAFF

## 2022-09-21 NOTE — PLAN OF CARE
Goal Outcome Evaluation:    Plan of Care Reviewed With: patient     Overall Patient Progress: no change    Major Shift Events:  Sedated on propofol, versed, and fentanyl gtts. RASS -5. No RN witnessed seizures. Tmax 38. Blood cultures sent. SR 90-110s with occasional to frequent PVC. Started shift off pressors. Around 2000, MAPs low 60s so epi started. Provider notified of epi being turned on.  No new orders. Around 2200, up to 0.06 of epi and MAPs still around 65. Provider notified and at bedside. Vaso ordered and started. Around 2330, now on 3 of vaso and 0.08 of epi, provider ordered 250mL albumin. After albumin was given, there was little response and MAPs low 60s. Levo gtt ordered and started. Epi and vaso now off. MAPs >65. 2 soft stools overnight. FWF increased to  60mL q2hr. Okay per CSI provider to hold lasix at 0030 due to low MAPs. Hgb 6.9 on am labs, receiving 1 unit PRBCs that will be completed on the next shift.    Plan: Titrate pressors as tolerated. Monitor for seizure activity.     For vital signs and complete assessments, please see documentation flowsheets.

## 2022-09-21 NOTE — PLAN OF CARE
Goal Outcome Evaluation:  Admitted/transferred from: 4E  Reason for admission/transfer: Continued ICU care  Patient status upon admission/transfer: Sedated, intubated and on mechanical ventilation  Interventions: Assess and monitor continuous vasoactive and sedating medications  Plan: MRI of head. Support as needed for recovery.  2 RN skin assessment: completed by PACO Valverde, RN and STEVIE Daniel RN  Result of skin assessment and interventions/actions: Noted abrasions left knee and left elbow; also large bruise with hematoma left upper arm. Reported tongue injury (unable to see it at this time).  Height, weight, drug calc weight: done  Patient belongings (see Flowsheet - Adult Profile for details): In clset.  MDRO education (if applicable):  Patient is unresponsive.

## 2022-09-21 NOTE — PROGRESS NOTES
[facility] Nurse Inpatient Assessment     Consulted for: ECMO    Patient History (according to provider note(s):      Brian Brunner is a 61 year old male with unknown PMHx who was admitted on 9/12/2022 following an OOHCA.      In brief review, pt was found by a co-worker to be hanging from a forklift unresponsive. Patient was lowered to the ground, CPR started, 911 was called, AED was placed and patient received 1 shock, along with 2 mg of epinephrine and 300 mg of Amiodarone with ROSC. EKG showed ST elevation in the later leads. Patient was brought to  [facility] and taken directly to [facility] where he had a coronary angiogram revealing 100% occluded mLAD s/p thrombectomy and JAMES, 80% LCx lesion s/p JAMES, 100% occluded OM2 s/p JAMES. A IABP, cooling cath and arterial lines placed and pateint was admitted to the ICU for further management. Unfortunately, patient went into VT storm, was shocked several times, Amio started, ROSC intermittently obtained but ultimately patient was so unstable, he was cannulated onto VA ECMO on 9/13.     Areas Assessed:      Areas visualized during today's visit: Focused: ECMO    ECMO cannula location: left Left groin ECMO cannula, Face and Feet  Positioning tolerance: Good  Date of ECMO cannulation: 9/14 left groin VA ECMO  De cannulation 9/19  Presence of ischemia: No  Location of ischemia: none   Pressure Injury Present::No  Pressure Injury Prevention Interventions In Place:  Z flow Positioner under head, Pillows for repositioning, TAPs Wedge Positioners in use, Heel off-loading boots, Pillows under calves for heel suspension, Mepilex Sacral Dressing and Dressing to sacrum for prevention        Pressure Injury Prevention Interventions Added:  Optifoam Dressing under ECMO Cannula     High risk mouth: constant bleeding and possible loose teeth        Treatment Plan:       Orders: Reviewed    RECOMMEND PRIMARY TEAM ORDER: None, at this time  Education  provided: plan of care  Discussed plan of care with: Nurse  WO nurse follow-up plan: signing off  Notify WOC if wound(s) deteriorate.  Nursing to notify the Provider(s) and re-consult the WOC Nurse if new skin concern.    DATA:     Current support surface: Standard  Low air loss mattress  Containment of urine/stool: Incontinent pad in bed  BMI: Body mass index is 35.56 kg/m .   Active diet order: Orders Placed This Encounter      NPO per Anesthesia Guidelines for Procedure/Surgery Except for: Meds     Output: I/O last 3 completed shifts:  In: 4476.23 [I.V.:2346.23; NG/GT:620]  Out: 2750 [Urine:2750]     Labs:   Recent Labs   Lab 09/21/22  0357 09/20/22 2117 09/20/22  1559 09/20/22  0955 09/20/22  0353   ALBUMIN 2.5*   < > 2.1*   < > 2.1*   HGB 6.9*   < > 7.4*   < > 8.1*   INR  --   --  1.17*   < > 1.28*   WBC 29.3*   < > 27.7*   < > 34.5*   CRP  --   --   --   --  248.00*    < > = values in this interval not displayed.     Pressure injury risk assessment:   Sensory Perception: 1-->completely limited  Moisture: 4-->rarely moist  Activity: 1-->bedfast  Mobility: 1-->completely immobile  Nutrition: 2-->probably inadequate  Friction and Shear: 2-->potential problem  Cholo Score: 11    Yvrose Singh RN CWOCN   Dept. Pager: [phone]  Dept. Office Number: [phone]

## 2022-09-22 NOTE — PROGRESS NOTES
EEG CLINICAL NEUROPHYSIOLOGY PRELIMINARY REPORT    Video EEG through approximately 9 AM on 9/22/2022 reviewed.  Midazolam 6 mg/h, propofol 25 mcg/kg/min, fentanyl 150 mcg/h throughout.  Intense burst suppression occupies most of the recording with 40 to 50  V runsof generalized spike and polyspike continue with theta and delta, up to 50  V in amplitude alternating with periods of suppression lasting up to 8 seconds.  Runs of generalized polyspikes continue; during many of these persistence of epileptiform activity exceeds 50% meeting excepted criteria electrographic seizures continue.  Electrographic seizures.  8 such periods are recorded during this study with duration ranging between 30 and 100 minutes.  Review of the video during some of these did not show myoclonic jerking or other unusual clinical activity.    Study continues severely abnormal.  1) intense burst suppression pattern continues.  This is consistent with a severe diffuse encephalopathy.  Findings may in part be related to anesthetic drips employed.  2) periods of persistent epileptiform activity that meet criteria for electrographic seizures continue.  No clear clinical correlate.  Number and the duration of these appear to have increased somewhat with propofol discontinuation.    Miguel Angel Joiner MD  Contact information for physicians covering Epilepsy and EEG is available on [facility].  Click search, enter neurology adult/[facility] in group name box, click on neurology adult/[facility], then click Staff Epilepsy and EEG.

## 2022-09-22 NOTE — PLAN OF CARE
Goal Outcome Evaluation:    Plan of Care Reviewed With: patient     Overall Patient Progress: no change    Outcome Evaluation: Levo remains off, vent settings unchanged.    ICU End of Shift Summary. See flowsheets for vital signs and detailed assessment.    Changes this shift: Patient remains unresponsive to stimuli. Coughs with stimulation. Vent settings unchanged overnight. Tylenol x2 for fevers. Levo remains off overnight. Heparin gtt therapeutic at 1800. Owens with adequate urine output. No BM overnight. Insulin gtt running in algorithm 4+1.     Plan: Continue plan of care.

## 2022-09-22 NOTE — PROGRESS NOTES
[facility]: [place]  NeuroCritical Care Progress Note    Patient Name:  Brian D Brunner  MRN:  [id number]    :  1960  Date of Service:  2022  Primary care provider:  No primary care provider on file.    Assessment/Plan:  Brian D Brunner is a 61 year old male with a unknown past medical history of admitted on 2022 for Cardiac arrest.  pt was being warmed, rearrested and was cannulated for VA ECMO. NCC consulted for concerns for seizures.     24 hour updates: Electrographic seizure activity worsened after Propofol turned down yesterday. On 4- hour hygiene break.      vEEG :  Study continues severely abnormal.  1) intense burst suppression pattern continues.  This is consistent with a severe diffuse encephalopathy.  Findings may in part be related to anesthetic drips employed.  2) periods of persistent epileptiform activity that meet criteria for electrographic seizures continue.  No clear clinical correlate.  Number and the duration of these appear to have increased somewhat with propofol discontinuation.    CT head :  Impression:  1. No acute intracranial pathology.   2. Unchanged mucosal thickening throughout the paranasal sinuses.    MRI brain with and without    Impression:  1. No definite temporal lobe abnormality, mass, or congenital lesion  identified as a cause of the patient's seizures.  2. No acute infarct or abnormal intracranial enhancement.  3. Findings suggestive of chronic small vessel ischemic disease.    NEURO RECS  - Continue vEEG   - Continue Onfi 10 mg BID  - Continue Keppra to 2 grams BID   - Continue VPA 1 gram every 8 hours   - Continue Propofol drip to 25 mcg/kg/minute set rate, no titration   - Increase midazolam to 10 mg/hour (ordered)   - Give one- time midazolam 4 mg bolus (ordered)  - We will continue to follow and monitor their EEG and neurologic exam, please call #[phone] with any questions    The patient was seen and  "discussed with NCC attending, Dr. Dong, and he is in agreement with the above assessment, plan, and recommendations.     Shelby Neri, VLADIMIR, CNP  Neurocritical Care *[phone]    Physical Examination:   /64   Pulse 87   Temp 99.3  F (37.4  C)   Resp 17   Ht 1.702 m (5' 7\")   Wt 102.5 kg (225 lb 15.5 oz)   SpO2 100%   BMI 35.39 kg/m    General: Adult male patient, lying in bed, critically-ill.  HEENT: Normocephalic, atraumatic, no icterus, oral cavity/oropharynx pink and moist, ETT present.   Cardiac: RRR on bedside monitor.  Pulm: Synchronous with ventilator.   Abdomen: Non-distended.   Extremities: Appears adequately perfused.   Skin: No obvious rashes or lesions on exposed skin.   Psych: Sedated  Neuro:  Pt is deeply sedated. Pupils equal and reactive.     I have personally reviewed all labs and imaging for this patient.    "

## 2022-09-22 NOTE — PROGRESS NOTES
Cardiology ICU Daily Progress     Patient Name: Brian D Brunner  Medical Record Number: [id number]  YOB: 1960  PCP: No primary care provider on file.         Chief Complaint:   Brian Brunner is a 61 year old male with unknown PMHx who was admitted on 9/12/2022 following an OOHCA         History of Present Illness:   In brief review, pt was found by a co-worker to be hanging from a forklift unresponsive. Patient was lowered to the ground, CPR started, 911 was called, AED was placed and patient received 1 shock, along with 2 mg of epinephrine and 300 mg of Amiodarone with ROSC. EKG showed ST elevation in the later leads. Patient was brought to  [facility] and taken directly to [facility] (formerly [facility]) where he had a coronary angiogram revealing 100% occluded mLAD s/p thrombectomy and JAMES, 80% LCx lesion s/p JAMES, 100% occluded OM2 s/p JAMES. An IABP, cooling cath and arterial lines placed and patient was admitted to the ICU for further management.    Cardiac risk factors: unknown PMHx  Last Echocardiogram 9/18: ECMO turndown from 3.1 l/m to 1.3 l/m, and IABP  Left ventricular function is decreased. The ejection fraction is 15-20%  (severely reduced). Apical akinesis with an apical thormbus present.  Global right ventricular function is normal. Trivial pericardial effusion is present.  Last Catheterization 9/16: Multivessel coronary disease status post prior PCI to the LAD, OM3, and mid LCx with patent stents in those areas. PCI s/p JAMES x1 to ostial RCA, JAMES x1 to mid LCx, JAMES x1 to distal LCx, and DESx1 to OM1  EKG 9/20: SR 90's, no signs of acute ischemia, QTc  0.472       Assessment and Plan:   Assessment:   Continued seizures over the night despite multiple antiepileptic medications  Plan:  -Diurese with lasix  - ongoing seizure management per Neurocritical care  -MRI without pathology  -Family updated    Assessment and Plan by System:  Neurology  # Anoxic brain injury    # Status Myoclonus  Intubated, sedated. Cooled  to 33 degrees. Rewarmed 9/13-9/14. CT head without intracranial pathology. Rhythmic twitching noted 9/16 - per EEG physician, status myoclonus.  Started on keppra, fixed rate propofol, fent, versed, valproic acid  -vEEG 9/20: Study consistent with severe diffuse encephalopathy.  Degree of background suppression more intense than would be expected with the sedative drips at levels employed.  Runs of generalized epileptiform activity continue which at times meet criteria for nonconvulsive seizures.  Last such seizure occurred between 3 and 4 AM today and appeared to respond to modest increase in midazolam drip.  Fent 150  Versed 4  Prop 25  Plan:  - propofol management per Neurocritical care  - keppra 2000 mg BID per Neurocritical care  - valproic acid 1000 mg Q8 hours per Neurocritical care  - onfi 5 mg  BID  - wean fentanyl as able  - continue vEEG   Cardiovascular  # non sustained VT after revascularization now on VA ECMO  # VF Cardiac arrest 2/2 STEMI  # CAD with STEMI s/p PCI to mLAD, LCx, OM2  # IABP in situ for shock and coronary perfusion  # Cardiomyopathy  # mural thrombus  Lactic 1.5, alk phos increase todayt to 130 after being normal , CR 0.95 stable, Good UO  As above, reportedly brief arrest with ROSC after 1 shock and ACLS meds. Coronary angiogram revealing 100% occluded mLAD s/p thrombectomy and JAMES, 80% LCx lesion s/p JAMES, 100% occluded OM2 s/p JAMES. Weaned off pressors 0200 9/13. VT storm early 9/14, now cannulated on VA ECMO. initially on amiodarone and lidocaine; lidocaine discontinued 9/15. Hbg A1c 7.5%. S/p additional PCI 9/16  - 9/20 IABP removal  -LV thrombus  Plan:  -Continue PO Amiodarone     -Heparin gtt for LV thrombus  CAD Management:  - DAPT: Continue ASA 81mg and ticagrelor 90mg BID  Goal Directed Medical Therapy:  -ARNI: Hold given likely reduced renal fxn after arrest  - Statin: Hold Lipitor for now given shock liver  - BB: Hold due to cardiogenic shock  -MRA Hold due to likely reduced  renal fx after arrest  -SGLT2i  Hold due to likely reduced renal fx after arrest  Pulmonary  # Acute hypoxemic respiratory failure requiring intubation  # aspiration pneumonia  # Rib fractures  # pl effusions  -O2 Delivery: Vent Mode: CMV/AC  (Continuous Mandatory Ventilation/ Assist Control)  FiO2 (%): 35 %  Resp Rate (Set): 18 breaths/min  Tidal Volume (Set, mL): 400 mL  PEEP (cm H2O): (S) 6 cmH2O  Resp: 15    -CXR 9/22: mild opacity bilateral, ETT 4 cn above lexy  ABG:   - Wean PEEP from 8 to g  - Daily CXR  - ABG after vent change   - Consider scheduled duonebs if signs of lung dz, currently PRN    - Peridex BID  Gastrointestinal, Nutrition  # Shock liver 2/2 cardiac arrest  LFTs down trending. Alk phos increasing today , will monitor  - Monitor LFTs  - bowel meds  -Nutrition: twp freddy HN 40 ml hour  -BM: 9/21  Plan:  - bowel meds  Renal, Electrolytes  # MARIANA  # Hypoalbuminemia   # Metabolic Alkalosis  # Lactic acidosis, resolved  I/O's: +1900 yesterday   CO2 27 CR 0.95  Plan:  -diurese today  -FWF 60 ml every 4 hours  - Monitor urine output  - Avoid nephrotoxins  Hematology  # Anemia, secondary to acute blood loss and critical illness  # Mural thrombus  # Thrombocytopenia, worsening  - ASA/ticagrelor for JAMES  -Heparin gtt for LV thrombus  - follow platelets  Endocrinology  # DM II  -191  8-10 units of insulin an hour  - insulin gtt as needed  Infectious Disease  # Aspiration Pneumonia  # Leukocytosis  # Oral Packing secondary to tooth removal  WBC 25.6(41.3) today, beginning to downtrend.  Tmax 101.3 at 2000 last night,   - Monitor for signs of infection  Cultures thus far:                - No growth to date  Antibiotics               - Vancomycin 9/13                - Zosyn 9/13-9/19  MSK/Skin  -L groin ECMO site with sutures. No hematoma, no oozing, distal pulses palpable and equal  -R groin IABP site. No hematoma, no oozing, distal pulses palpated and equal  -9/19:Oral packing removed by  "ENT  Plan:  -monitor old line sites for signs of infection    ICU Cares:  CXR: clear, devices in appropriate position  Fluids/Feeds:  TF  DVT Prophylaxis: heparin infusion  GI Prophylaxis: protonix  Bowel Regimen: senna and miralax  Anticipated Floor Transfer: NA  Lines/Tubes/Drains:  PIV X2  Arterial line  RIJ quad lumen CVC  OG  Owens  ETT    Code Status: FULL    Family updated by me    Patient seen, discussed, and plan reviewed with     Time Spent on this Encounter   Lisandro was seen and evaluated by me on 09/22/22.  He was in critical condition as the result of refractory VT/VF arrest, cardiogenic shock.    His condition is now Critical.      The acute issues managed by me today include,  Coordinating are with neuro critical care to manage on going seizures  Supportive interventions provided and/or ordered by me include fluid status, cardiogenic shock, oxygenation     Total Critical Care time spent by me, excluding procedures, was 104 minutes.    VLADIMIR Lloyd CNP Over 50% of our time on the unit was spent counseling the patient and/or coordinating care regarding cardiac arrest.    Rosalinda Bernabe DNP APRN CNP  [facility] Cardiology  Pager [phone]           Review of Systems:   Negative except noted above         Allergies:   Not on File            Physical Exam:   Vitals were reviewed.  Blood pressure 105/64, pulse 89, temperature 99.1  F (37.3  C), resp. rate 15, height 1.702 m (5' 7\"), weight 102.5 kg (225 lb 15.5 oz), SpO2 97 %.    General: In bed, in NAD  HEENT: PERRL, no scleral icterus or injection  CARDIAC: RRR, no m/r/g appreciated. Peripheral pulses palpated  RESP: Mechanical ventilation; CTAB, no wheezes, rhonchi or crackles appreciated.  GI: Soft, non-tender, BS+, no masses appreciated  : Owens  EXTREMITIES: 1+ LE edema, pulses 2+. Femoral access site w/o bleeding, dressing c/d/i. No bruits appreciated.   SKIN: No acute lesions appreciated  NEURO: Intubated and sedated           " Data:   [facility]  Recent Labs   Lab 09/22/22  1226 09/22/22  1123 09/22/22  1017 09/22/22  0915 09/22/22  0358 09/22/22  0310 09/21/22  1614 09/21/22  1600 09/21/22  0403 09/21/22  0357 09/21/22  0004 09/20/22  2211 09/20/22  2208 09/20/22  2117 09/20/22  0359 09/20/22  0353 09/19/22  0347 09/19/22 0341   NA  --   --   --   --   --  147*  --  149*  --  151*  --  152*  --  149*   < > 145   < > 145   POTASSIUM  --   --   --   --   --  4.0  --  4.1  --  4.3  --  3.7  --  3.8  3.8   < > 3.6   < > 4.1   CHLORIDE  --   --   --   --   --  111*  --  113*  --  114*  --  114*  --  112*   < > 110*   < > 107   CO2  --   --   --   --   --  27  --  27  --  31*  --  28  --  29   < > 25   < > 33*   ANIONGAP  --   --   --   --   --  9  --  9  --  6*  --  10  --  8   < > 10   < > 5*   * 151* 175* 172*   < > 146*   < > 153*   < > 141*   < > 113*   < > 122*   < > 165*  171*   < > 184*  191*   BUN  --   --   --   --   --  43.8*  --  43.9*  --  53.2*  --  53.1*  --  54.0*   < > 51.7*   < > 42.6*   CR  --   --   --   --   --  0.95  --  0.89  --  1.03  --  1.13  --  1.13   < > 1.12   < > 1.35*   GFRESTIMATED  --   --   --   --   --  >90  --  >90  --  83  --  74  --  74   < > 75   < > 60*   JACKIE  --   --   --   --   --  8.4*  --  8.3*  --  8.3*  --  8.1*  --  8.2*   < > 8.5*   < > 8.6*   MAG  --   --   --   --   --  2.2  --  2.2  --  2.3  --   --   --  2.3   < > 2.3   < > 2.3   PHOS  --   --   --   --   --  3.2  --   --   --  3.7  --   --   --   --   --  2.6  --  5.0*   PROTTOTAL  --   --   --   --   --  5.3*  --  5.3*  --  5.2*  --  5.0*  --  5.1*   < > 4.7*   < > 5.3*   ALBUMIN  --   --   --   --   --  2.6*  --  2.5*  --  2.5*  --  2.3*  --  2.3*   < > 2.1*   < > 2.3*   BILITOTAL  --   --   --   --   --  0.3  --  0.3  --  0.2  --  0.2  --  0.3   < > 0.3   < > 0.5   ALKPHOS  --   --   --   --   --  130*  --  122  --  102  --  99  --  101   < > 94   < > 99   AST  --   --   --   --   --  50  --  48  --  35  --  37  --  36   < > 37   < >  37   ALT  --   --   --   --   --  24  --  20  --  15  --  18  --  16   < > 17   < > 19    < > = values in this interval not displayed.     CBC  Recent Labs   Lab 22  1600 22  0944 22  0357 22  2211   WBC 25.7* 28.4*  --  29.3* 41.4*   RBC 2.54* 2.66*  --  2.32* 2.57*   HGB 7.5* 7.7* 7.6* 6.9* 7.5*   HCT 23.2* 24.4*  --  21.2* 23.2*   MCV 91 92  --  91 90   MCH 29.5 28.9  --  29.7 29.2   MCHC 32.3 31.6  --  32.5 32.3   RDW 15.8* 15.3*  --  14.6 14.6    210  --  177 201     INR  Recent Labs   Lab 220 22  1559 22  0955 22  0353   INR 1.16* 1.17* 1.18* 1.28*     Arterial Blood Gas  Recent Labs   Lab 22  0946 22  0357   PH 7.43 7.41 7.40 7.39   PCO2 45 45 48* 49*   PO2 81 79* 88 129*   HCO3 29* 29* 30* 30*   O2PER 35 35 35 50     Lipids  Recent Labs   Lab 22  0351   CHOL 78   HDL 40   LDL 24     TSHNo lab results found in last 7 days.  [id number] lab results found in last 7 days.  TroponinInvalid input(s): TROP    Imaging/procedure results:  EEG Video 12-26 hr Unmonitored  EEG Video 12-26 hr Unmonitored Result    VIDEO EEG DATE: 22  VIDEO EEG [id number]  VIDEO EEG DAY#: 9  VIDEO EEG SOURCE FILE DURATION: 23 hours and 07 minutes    PATIENT HISTORY: 61-year old status postcardiac arrest.  Intubated and   encephalopathic in ICU.  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  As best as can be determined from electronic medical   record, patient was treated with midazolam 6 mg/h, propofol 25 mcg/kg/min,   and fentanyl 150 mcg/h during this study.  Propofol had been reduced   somewhat and midazolam increased since yesterday's study.    TECHNICAL SUMMARY: This is a video-EEG monitoring study. EEG was recorded   from 23 scalp electrodes placed according to the 10-20 international   system. Additional electrodes were utilized for referencing, artifact   detection, and recording  from other cerebral regions. Qualified   technicians attached EEG electrodes, set up the study, monitored and   reviewed EEG recordings, and disconnected EEG electrodes when appropriate.   Video was continuously recorded. Video was reviewed for clinical   correlation and to assist with EEG interpretations.     BACKGROUND ACTIVITY: An intensive burst suppression pattern continues.    Theta and delta 30 to 40  V in amplitude with duration up to 8 seconds   alternates with periods of intense suppression with a duration of 8   seconds or more.  During periods of intense suppression, no clear   electrocerebral activity is seen, even at high sensitivities (2  V/mm).    Bursts are generally synchronous but some asynchrony's are noted.    ACTIVATION PROCEDURE: Stimulation performed utilizing ICU protocol around   9:30 AM on 9/21.  This did not induce any changes in background.    OTHER INTERICTAL ABNORMALITIES: See below.    ICTAL ABNORMALITIES: Long runs of spike wave, polyspikes, and polyspike   waves continue.  Persistence of these exceeds 50% at times.  Such runs   occur on 9/22 starting at 12: 15 p.m. (time 75 minutes), 2:16PM   (approximately 100 minutes), 6:07 PM (approximately 60 minutes), 8:20 PM   (approximately 40 minutes), 00: 13 a.m. on 9/22 (approximately 30   minutes), 2:03AM (approximately 40 minutes), and 3:50 AM (approximately 40   minutes).  Video is reviewed during some of these.  No jerking or other   unusual clinical activity is seen.    IMPRESSION: Severely abnormal.  1) severe burst suppression pattern is noted.  This is consistent with a   severe diffuse encephalopathy.  Findings may in part be related to   anesthetic drips.  However, intensity of burst suppression is greater then   typically seen with sedative drips at the doses reported.  This indicates   some degree of underlying cerebral dysfunction separate from anesthetic   effects.  2) runs of generalized spike and polyspike continue.  Some  of these are   quite lengthy and meet standard criteria for nonconvulsive seizures.    Review of video during some of these does not show myoclonic jerks or   other clinical correlate.  Duration and time of these bursts are   summarized above.  Overall they appear more frequent and persistent than   in yesterday's study.    Miguel Angel Joiner MD  EPILEPSY STAFF   XR Chest Port 1 View  Narrative: EXAM: XR CHEST PORT 1 VIEW  9/22/2022 5:55 AM     HISTORY:  ETT positioning       COMPARISON:  Chest x-ray 9/21/2022    FINDINGS: AP radiograph of the chest. Endotracheal tube projecting 4.2  cm above the lexy. Partially visualized enteric tube coursing below  the diaphragm. Right  IJ central venous catheter with tip overlying  the right brachiocephalic confluence.     Stable enlargement of the cardiomediastinal silhouette. Low lung  volumes. No definite pneumothorax. No significant pleural effusion.  Increased hazy right basilar opacity. Visualized upper abdomen is  unremarkable.  Impression: IMPRESSION:  1. Stable positioning of the endotracheal tube and right IJ central  venous catheter.  2. Low lung volumes with increased right basilar hazy opacity likely  representing atelectasis versus pulmonary edema.    I have personally reviewed the examination and initial interpretation  and I agree with the findings.    TAMIA RIZO MD         SYSTEM ID:  [id number]

## 2022-09-22 NOTE — PLAN OF CARE
ICU End of Shift Summary. See flowsheets for vital signs and detailed assessment.    Changes this shift: Patient remains RASS -5 on Prop/Fent/Versed. Versed bolus given, rate increased to 10 d/t seizure activity on EEG. Levo on for ~4hrs this afternoon (0.02-0.03), currently off. Heparin & Insulin gtts continue. 60 Lasix given with good UOP. Tube feeds at goal, FWF reduced to 60q4h.     Plan:  Versed titrations per neuro to break seizure activity. Care conference 9/23?

## 2022-09-23 NOTE — PROGRESS NOTES
Cardiology ICU Progress Note    Brief HPI: Brian Brunner is a 61 year old male with unknown PMHx who was admitted on 9/12/2022 following an OOHCA.    In brief review, pt was found by a co-worker to be hanging from a forklift unresponsive. Patient was lowered to the ground, CPR started, 911 was called, AED was placed and patient received 1 shock, along with 2 mg of epinephrine and 300 mg of Amiodarone with ROSC. Again shocked upon EMS arrival with ROSC. EKG showed ST elevation in the later leads. Patient was brought to  [facility] and taken directly to [facility] where he had a coronary angiogram revealing 100% occluded mLAD s/p thrombectomy and JAMES, 80% LCx lesion s/p JAMES, 100% occluded OM2 s/p JAMES. An IABP, cooling cath and arterial lines placed and patient was admitted to the ICU for further management. Unfortunately, on 9/13, patient went into VT storm requiring several chocks, Amio started, ROSC intermittently obtained but ultimately patient was so unstable, he was cannulated onto [facility] ECMO on 9/13. Underwent repeat angio for PCI of residual CAD on 9/16 with PCI to PCI to the LAD, OM3, and mid LCx with patent stents in those areas. PCI s/p JAMES x1 to ostial RCA, JAMES x1 to mid LCx, JAMES x1 to distal LCx, and DESx1 to OM1. Patient was decannulated 9/19, IABP removed 9/20. Course c/b status myoclonus.     Subjective and Interval: NAEO. Increased Versed from 8-->12 mg/hour with more suppresion of seizure activity.     Assessment and plan by system:   Today's changes:  Discuss with neuro decreasing Versed and re-assess EEG  Retract ETT 2 cm  Start Long acting insulin  Decrease Fentanyl 25 mg/hr     Neurology   # Anoxic brain injury    # Status Myoclonus  # Chronic small vessel ischemic disease  Intubated, sedated. S/p TTM to 33 degrees. Rewarmed 9/14.   CT head without intracranial pathology. Seizures on 9/16 with status myoclonus thus started on keppra, Propofol, fentanyl and Versed, and Valproic acid. CTH repeat 9/20 unchanged.  Brain MRI 9/21 with no acute infarct or abnormal intracranial enhancement, e/o chronic small vessel ischemic disease.    -vEEG 9/23: Burst suppression pattern continues with longer periods of suppression towards the end of the study.  Burst suppression continues, c/w severe diffuse encephalopathy.  Durations of suppression did increase after midazolam doses were modestly increased from 8mg to 12mg/h, indicating some sensitivity to midazolam. Runs of generalized epileptiform discharges continue indicating significant seizure tendency.  Overt subclinical electrographic seizures continue but are shorter and less frequent than in yesterday's study.  This suggests that midazolam may be effective in controlling seizure tendency in this situation.     Sedation  Fentanyl 150 mcg/h  Propofol 25 mcg/kg/min  Midazolam increased from 8 mg/h to 12 mg/h     Anti-epileptics:   - keppra 2000 mg BID   - valproic acid 1000 mg Q8 hours   - onfi 5 mg  BID    Plan:  - Anti-epileptics and Versed per NCC  - Continue vEEG   - Care conference    Cardiovascular  # non sustained VT after revascularization now on VA ECMO and IABP  # VF Cardiac arrest 2/2 STEMI  # CAD with STEMI s/p PCI to mLAD, LCx, OM2  # Cardiomyopathy  # Mural thrombus  As above, reportedly brief arrest with ROSC after 1 shock and ACLS meds. Coronary angiogram revealing 100% occluded mLAD s/p thrombectomy and JAMES, 80% LCx lesion s/p JAMES, 100% occluded OM2 s/p JAMES. Weaned off pressors 0200 9/13. VT storm early 9/14, cannulated on VA ECMO. Coronary angiogram repeated 9/16 for residual CAD  S/p PCI to the LAD, OM3, and mid LCx with patent stents in those areas. PCI s/p JAMES x1 to ostial RCA, JAMES x1 to mid LCx, JAMES x1 to distal LCx, and DESx1 to OM1. Patient was decannulated 9/19, IABP removed 9/20.     Intermittently on pressors    Plan:  - Continue PO Amiodarone     - Heparin gtt for LV thrombus  - Continue ASA 81mg and ticagrelor 90mg BID  - Start GDMT when off  pressors    Pulmonary  # Acute hypoxemic respiratory failure requiring intubation  # aspiration pneumonia  # Rib fractures  # pl effusions  Vent Mode: CMV/AC  (Continuous Mandatory Ventilation/ Assist Control)  FiO2 (%): 35 %  Resp Rate (Set): 18 breaths/min  Tidal Volume (Set, mL): 400 mL  PEEP (cm H2O): (S) 6 cmH2O  Resp: 15     Plan:  - ABG daily  - Daily CXR  - ABG after vent change   - Consider scheduled duonebs if signs of lung dz, currently PRN    - Peridex BID    Gastrointestinal, Nutrition  # Shock liver 2/2 cardiac arrest  LFTs down trending. Alk phos increasing today, will monitor  - Monitor LFTs  - bowel meds  - Nutrition: two freddy HN 40 ml hour  - BM: 9/21    Plan:  - Ramp up bowel meds    Renal, Electrolytes  # MARIANA  # Hypoalbuminemia   # Hypernatremia  I/O's: +1.2 L  yesterday, UOP 2.8 L    Crt 1.1    Plan:  -diuretic holiday  -FWF 60 ml every 4 hours  - Monitor urine output  - Avoid nephrotoxins    Hematology  # Anemia, secondary to acute blood loss and critical illness  # Mural thrombus  # Thrombocytopenia, worsening  hgb stable 7.5, plt 338  - ASA/ticagrelor for JAMES  - Heparin gtt for LV thrombus    Endocrinology  # DM II  A1c 7.6  - start long acting insulin    Infectious Disease  # Aspiration Pneumonia  # Leukocytosis  WBC 27 (25.6) Tmax 101.3 at 2000 last night,   - Monitor for signs of infection  Cultures thus far:                - No growth to date  Antibiotics               - Vancomycin 9/13                - Zosyn 9/13-9/19    MSK/Skin  -L groin ECMO site with sutures. No hematoma, no oozing, distal pulses palpable and equal  -R groin IABP site. No hematoma, no oozing, distal pulses palpated and equal  -9/19:Oral packing removed by ENT     ICU Cares:  CXR: Unchanged  Fluids/Feeds:  TF  DVT Prophylaxis: heparin infusion  GI Prophylaxis: protonix  Bowel Regimen: senna and miralax  Anticipated Floor Transfer: NA    Lines/Tubes/Drains:  PIV X2  Arterial line  RIJ quad lumen  "CVC  OG  [place]  ETT    Family updated by me    Patient seen and discussed with staff physician.    Time Spent on this Encounter   I spent 75 minutes on the unit/floor managing the care of Brian D Brunner. Over 50% of my time was spent on the following:   - Counseling the patient and/or family regarding: diagnostic results, prognosis, risks and benefits of treatment options and recommended follow-up  - Coordination of care with the: consultant(s), care coordinator/ and nurse  Specifics include prognosis, medications, testing, plan of care    VLADIMIR Villagomez, CNP  [facility]  Interventional Cardiology-CSI Service  Pager [phone]     Objective:  Most recent vital signs:  /64   Pulse 104   Temp (!) 100.6  F (38.1  C)   Resp 13   Ht 1.702 m (5' 7\")   Wt 104.1 kg (229 lb 8 oz)   SpO2 96%   BMI 35.94 kg/m    Temp:  [99  F (37.2  C)-101.3  F (38.5  C)] 100.6  F (38.1  C)  Pulse:  [] 104  Resp:  [11-19] 13  MAP:  [59 mmHg-83 mmHg] 83 mmHg  Arterial Line BP: (101-148)/(41-56) 127/56  FiO2 (%):  [30 %-35 %] 30 %  SpO2:  [91 %-100 %] 96 %  Wt Readings from Last 2 Encounters:   09/23/22 104.1 kg (229 lb 8 oz)       Intake/Output Summary (Last 24 hours) at 9/23/2022 1144  Last data filed at 9/23/2022 1000  Gross per 24 hour   Intake 3766.34 ml   Output 3155 ml   Net 611.34 ml     Physical exam:  General: In bed, in NAD  HEENT: PERRL, no scleral icterus or injection  CARDIAC: RRR, no m/r/g appreciated. Peripheral pulses dopplered  RESP: Mechanical ventilation; CTAB, no wheezes, rhonchi or crackles appreciated.  GI: soft, BS hypoactive  : [place]  EXTREMITIES: NO LE edema, pulses 2+. Femoral access site w/o bleeding, dressing c/d/i.   SKIN: No acute lesions appreciated  NEURO: Intubated and sedated    Labs (Past three days):  CBC  Recent Labs   Lab 09/23/22  0405 09/22/22  1615 09/22/22  0310 09/21/22  1600   WBC 27.6* 25.8* 25.7* 28.4*   RBC 2.56* 2.61* 2.54* " 2.66*   HGB 7.5* 7.6* 7.5* 7.7*   HCT 23.8* 24.0* 23.2* 24.4*   MCV 93 92 91 92   MCH 29.3 29.1 29.5 28.9   MCHC 31.5 31.7 32.3 31.6   RDW 16.0* 15.9* 15.8* 15.3*    292 247 210     [facility]  Recent Labs   Lab 09/23/22  1000 09/23/22  0902 09/23/22  0755 09/23/22  0647 09/23/22  0454 09/23/22  0405 09/22/22  2305 09/22/22  2212 09/22/22  1809 09/22/22  1615 09/22/22  0358 09/22/22  0310 09/21/22  1614 09/21/22  1600 09/21/22  0403 09/21/22  0357 09/20/22  0359 09/20/22  0353   NA  --   --   --   --   --  148*  --  148*  --  151*  --  147*  --  149*  --  151*   < > 145   POTASSIUM  --   --   --   --   --  3.8  --  3.6  --  3.9  --  4.0  --  4.1  --  4.3   < > 3.6   CHLORIDE  --   --   --   --   --  112*  --  110*  --  112*  --  111*  --  113*  --  114*   < > 110*   CO2  --   --   --   --   --  29  --  27  --  25  --  27  --  27  --  31*   < > 25   ANIONGAP  --   --   --   --   --  7  --  11  --  14  --  9  --  9  --  6*   < > 10   * 134* 137* 114*   < > 169*   < > 171*   < > 101*   < > 146*   < > 153*   < > 141*   < > 165*  171*   BUN  --   --   --   --   --  48.5*  --  46.6*  --  47.1*  --  43.8*  --  43.9*  --  53.2*   < > 51.7*   CR  --   --   --   --   --  1.11  --  1.04  --  0.98  --  0.95  --  0.89  --  1.03   < > 1.12   GFRESTIMATED  --   --   --   --   --  76  --  82  --  88  --  >90  --  >90  --  83   < > 75   JACKIE  --   --   --   --   --  8.8  --  8.6*  --  9.0  --  8.4*  --  8.3*  --  8.3*   < > 8.5*   MAG  --   --   --   --   --  2.1  --   --   --  2.3  --  2.2  --  2.2  --  2.3   < > 2.3   PHOS  --   --   --   --   --  3.9  --   --   --   --   --  3.2  --   --   --  3.7  --  2.6    < > = values in this interval not displayed.     Troponins:     INR  Recent Labs   Lab 09/23/22  0405 09/22/22  0310 09/20/22  1559 09/20/22  0955   INR 1.18* 1.16* 1.17* 1.18*     Liver panel  Recent Labs   Lab 09/23/22  0405 09/22/22  2212 09/22/22  1615 09/22/22  0310   PROTTOTAL 5.6* 5.4* 5.7* 5.3*   ALBUMIN 2.4*  2.5* 2.7* 2.6*   BILITOTAL 0.3 0.3 0.3 0.3   ALKPHOS 146* 139* 143* 130*   AST 77* 73* 78* 50   ALT 42 38 39 24       Imaging/procedure results:  XR Chest Port 1 View  Narrative: EXAM: XR CHEST PORT 1 VIEW  2022 5:52 AM     HISTORY:  ETT positioning       COMPARISON:  Chest x-ray 2022    FINDINGS: AP radiograph of the chest. Endotracheal tube projecting 1.4  cm above the lexy. Partially visualized enteric tube coursing into  the stomach. Right IJ central venous catheter with tip overlying the  right brachiocephalic confluence.     Stable enlargement of the cardiomediastinal silhouette.. No  pneumothorax or significant pleural effusion. Slight increased  perihilar and dense retrocardiac opacities. Visualized upper abdomen  is within normal limits  Impression: IMPRESSION:    1.Endotracheal tube projects within 2 cm of the lexy. Consider  slight retraction.  2. Increased perihilar and retrocardiac opacities, likely representing  pulmonary edema and/or atelectasis.    I have personally reviewed the examination and initial interpretation  and I agree with the findings.    GILBERT GAYLE MD         SYSTEM ID:  [id number]  EEG Video 12-26 hr Unmonitored  EEG Video 12-26 hr Unmonitored Result    VIDEO EEG DATE: 22  VIDEO EEG [id number]  VIDEO EEG DAY#: 10  VIDEO EEG SOURCE FILE DURATION: 17 hours and 54 minutes    PATIENT HISTORY: 61-year-old status post a cardiac arrest.  Previous   recordings in this series of studies shown myoclonic status epilepticus   and subclinical electrographic seizures..  He remains intubated and   encephalopathic in ICU.  During this study he was treated with fentanyl   150mcg/h, propofol 25 mcg/kg/min.  Midazolam doses were progressively   increased from 8 mg/h to 10 mg/h (at around 10 AM) and ultimately 12 mg/h   (around 9 PM) on .    TECHNICAL SUMMARY: This is a video-EEG monitoring study. EEG was recorded   from 23 scalp electrodes placed according to the 10-20  international   system. Additional electrodes were utilized for referencing, artifact   detection, and recording from other cerebral regions. Qualified   technicians attached EEG electrodes, set up the study, monitored and   reviewed EEG recordings, and disconnected EEG electrodes when appropriate.   Video was continuously recorded. Video was reviewed for clinical   correlation and to assist with EEG interpretations.     BACKGROUND ACTIVITY: Atypical a burst suppression pattern at study onset   with runs of 30 to 40  V delta activity, up to 50  V in amplitude, up to 3   seconds, alternating with periods of suppression up to 6 seconds or so.    Generalized spikes were occasionally intermixed but were not particularly   persistent accepted during electrographic seizures (see below).  Following   the increase of midazolam, duration of suppression increased to an average   of 8 to 10 seconds.    ACTIVATION PROCEDURE: No clear changes in background with the stimulation   associated with cares.    OTHER INTERICTAL ABNORMALITIES: Occasional generalized spikes and bursts.    ICTAL ABNORMALITIES: Sustained bursts of spike and polyspike recorded   intermittently.  During some of these, persistence of epileptiform   activity exceeded 50% meeting criteria for electrographic seizures.  These   occurred between 6 AM and 6:28 AM, 8:16 AM and 8:43 AM on 9/22 and between   1:57 AM and to 10AM, and 3:50 AM and 4:10 AM on 9/23.  There were other   periods of sustained epileptiform activity (for example 5:44 PM and 11:42   PM on 9/22) but persistence of epileptiform activity during these was less   and durations were brief not meeting criteria for electrographic seizures.    Intermittent review of the video during these periods of time does not   show clear clinical changes.    IMPRESSION: Severely abnormal.  1) burst suppression pattern, consistent with severe diffuse   encephalopathy.  This may in part be related to sedative drips  employed.    Durations of suppression did increase after midazolam doses were modestly   increased from 8mg to 12mg/h, indicating some sensitivity to midazolam.  2) runs of generalized epileptiform discharges continue indicating   significant seizure tendency.  Overt subclinical electrographic seizures   continue but are shorter and less frequent than in yesterday's study.    This suggests that midazolam may be particularly effective in controlling   epileptiform discharges.    Miguel Angel Joiner MD  EPILEPSY STAFF

## 2022-09-23 NOTE — PROGRESS NOTES
[facility]  Palliative Care Brief Daily Progress Note        Palliative Care is involved for patient/family support and goals of care. Patient is s/p ECMO decannulation and remains in status epilepticus. I believe we should have a care conference with the family soon to discuss goals of care and reasonable next steps.     We will continue to chart check and see as needed. Please page us if there is a care conference or more involvement from our team would be helpful.      Thank you for the opportunity to be involved in the care of this patient.     VLADIMIR Elena CNP  Palliative Care Consult Team  [facility] Inpatient Team Consult pager [phone] (M-F 8-4:30)  After-hours Answering Service [phone]   Palliative Clinic: [phone]

## 2022-09-23 NOTE — PROGRESS NOTES
Retracted ETT 2 cm per phone order with verbal read back at request of primary team. ETT is now 25 cm at the teeth.    Zoila Santizo RT

## 2022-09-23 NOTE — PLAN OF CARE
ICU End of Shift Summary. See flowsheets for vital signs and detailed assessment.    Changes this shift: RASS -5. Fent changed from 150 to 50. All other drips unchanged. Checking NPIs with the pupilometer q4h per neuro. EEG remains in place. Levo off at start of the shift. Febrile, T max 100.8, PRN tylenol given with little to no relief. Decreased FiO2 from 35 to 30, raised back up to 35 d/t low pO2 on ABG. No BM this shift. Insulin gtt running alg 4 +3. Brother updated via phone on POC.     Plan: Continue to trend labs.     Goal Outcome Evaluation:    Plan of Care Reviewed With: patient, sibling     Overall Patient Progress: no change    Outcome Evaluation: Still on vent, RASS -5, off levo

## 2022-09-23 NOTE — PROGRESS NOTES
EEG CLINICAL NEUROPHYSIOLOGY INTERIM REPORT    Video EEG through approximately 6 AM today reviewed.  Fentanyl 150 mcg/h, propofol 25 mcg/kg/min.  Midazolam increased from 8 mg/h to 12 mg/h over the last 24 hours.  Burst suppression pattern continues with longer periods of suppression towards the end of the study.  Bursts of generalized epileptiform activity continue.  Overt subclinical electrographic seizures at 6 AM (28 minutes), 8:16 AM (27 minutes) on 9/22, and 1:57 AM (13 minutes) and 3:50 AM (30 minutes) on 9/23.    Study continues Severely abnormal.    1) burst suppression pattern continues, consistent with severe diffuse encephalopathy.  This may in part be related to sedative drips employed.  Durations of suppression did increase after midazolam doses were modestly increased from 8mg to 12mg/h, indicating some sensitivity to midazolam.  2) runs of generalized epileptiform discharges continue indicating significant seizure tendency.  Overt subclinical electrographic seizures continue but are shorter and less frequent than in yesterday's study.  This suggests that midazolam may be effective in controlling seizure tendency in this situation.    Detailed report in chart.    Miguel Angel Joiner MD  Contact information for physicians covering Epilepsy and EEG is available on [facility].  Click search, enter neurology adult/[facility] in group name box, click on neurology adult/[facility], then click Staff Epilepsy and EEG.

## 2022-09-23 NOTE — PROGRESS NOTES
[facility]: [place]  NeuroCritical Care Progress Note    Patient Name:  Brian D Brunner  MRN:  [id number]    :  1960  Date of Service:  2022  Primary care provider:  No primary care provider on file.    Assessment/Plan:  Brian D Brunner is a 61 year old male with a unknown past medical history of admitted on 2022 for Cardiac arrest.  pt was being warmed, rearrested and was cannulated for VA ECMO. NCC consulted for concerns for seizures.     24 hour updates: Electrographic seizure activity worsened after Propofol turned down yesterday. On 4- hour hygiene break.      vEEG :    Study continues Severely abnormal.     1) burst suppression pattern continues, consistent with severe diffuse encephalopathy.  This may in part be related to sedative drips employed.  Durations of suppression did increase after midazolam doses were modestly increased from 8mg to 12mg/h, indicating some sensitivity to midazolam.  2) runs of generalized epileptiform discharges continue indicating significant seizure tendency.  Overt subclinical electrographic seizures continue but are shorter and less frequent than in yesterday's study.  This suggests that midazolam may be effective in controlling seizure tendency in this situation.    CT head :  Impression:  1. No acute intracranial pathology.   2. Unchanged mucosal thickening throughout the paranasal sinuses.    MRI brain with and without    Impression:  1. No definite temporal lobe abnormality, mass, or congenital lesion  identified as a cause of the patient's seizures.  2. No acute infarct or abnormal intracranial enhancement.  3. Findings suggestive of chronic small vessel ischemic disease.    NEURO RECS  - Continue vEEG   - Continue Onfi 10 mg BID  - Continue Keppra to 2 grams BID   - Continue VPA 1 gram every 8 hours   - Continue Propofol drip to 25 mcg/kg/minute set rate, no titration   - Midazolam increased to 12 mg/hour  "during the night, keep rate for today  - Midazolam 4 mg bolus overnight   - NPI Q4H, call NCC if < 3.0  - We will continue to follow and monitor their EEG and neurologic exam, please call #[phone] with any questions    The patient was seen and discussed with NCC attending, Dr. Dong, and he is in agreement with the above assessment, plan, and recommendations.     Shelby Neri, APRN, CNP  Neurocritical Care *[id number]    Physical Examination:   /64   Pulse 98   Temp (!) 100.8  F (38.2  C)   Resp 13   Ht 1.702 m (5' 7\")   Wt 104.1 kg (229 lb 8 oz)   SpO2 100%   BMI 35.94 kg/m    General: Adult male patient, lying in bed, critically-ill.  HEENT: Normocephalic, atraumatic, no icterus, oral cavity/oropharynx pink and moist, ETT present.   Cardiac: RRR on bedside monitor.  Pulm: Synchronous with ventilator.   Abdomen: Non-distended.   Extremities: Appears adequately perfused.   Skin: No obvious rashes or lesions on exposed skin.   Psych: Sedated  Neuro:  Pt is deeply sedated. Pupils equal this AM.    I have personally reviewed all labs and imaging for this patient.    "

## 2022-09-23 NOTE — PLAN OF CARE
Goal Outcome Evaluation:    Plan of Care Reviewed With: patient     Overall Patient Progress: declining    Outcome Evaluation: EEG with seizure activity, Versed increased to 12, norepi gtt restarted.    ICU End of Shift Summary. See flowsheets for vital signs and detailed assessment.    Changes this shift: Remains unresponsive to stimuli, RASS -5. Seizure activity noted on EEG, bolused with 4 of versed and increased gtt to 12. No respiratory changes/vent changes overnight. Febrile Tmax 101.6 tylenol and ice packs utilized. Tachycardic with fever up to 110s. Levo gtt titrated to keep maps >65. Heparin gtt therapeutic at 1800. Insulin gtt running in algorithm 4 +3.     Plan: Possible care conference today. Continue plan of care.

## 2022-09-24 NOTE — PROGRESS NOTES
"EEG CLINICAL NEUROPHYSIOLOGY PRELIMINARY REPORT    Video EEG through around 6 AM this morning reviewed.  Patient treated with propofol 25 mcg/kg/min, midazolam 10 to 12 mg/h, and fentanyl 150 mcg/h.    Study continues Severely abnormal.  1) a fairly intense a typical burst suppression pattern is noted.  This is consistent with severe diffuse encephalopathy.  It may in part be related to the sedative drips employed.  2) runs of generalized polyspike exceeding 50% of background were recorded lasting from 14-60 minutes.  There is no accepted definition for electrographic seizure in this context; persistence of epileptiform activity exceeding 50% is widely used and was used in the study.  Following these criteria, about one third of the ongoing record consisted of electrographic seizures.  There was no clear video correlate so of these can be considered subclinical or \"nonconvulsive\" seizures.  Persistence of electrographic seizure was higher than in yesterday's study.    Detailed note in chart documents specific times of electrographic seizures.    Miguel Angel Joiner MD  Contact information for physicians covering Epilepsy and EEG is available on [facility].  Click search, enter neurology adult/[facility] in group name box, click on neurology adult/[facility], then click Staff Epilepsy and EEG.       "

## 2022-09-24 NOTE — PROGRESS NOTES
Care Management Follow Up    Length of Stay (days): 12    Expected Discharge Date:       Concerns to be Addressed: MD's requesting care conference      Patient plan of care discussed at interdisciplinary rounds: Yes    Anticipated Discharge Disposition:       Anticipated Discharge Services:    Anticipated Discharge DME:      Patient/family educated on Medicare website which has current facility and service quality ratings:    Education Provided on the Discharge Plan:    Patient/Family in Agreement with the Plan:      Referrals Placed by CM/SW:  None  Private pay costs discussed: Not applicable    Additional Information:  Nurse Practitioner asked SW to assist with setting up care conference for patient for tomorrow. Writer called Thao (contact listed in chart) to relay information. Thao aware of need for care conference and patient's critical condition.    Patient has 4 siblings and Aaron expects 3 out of the 4 to attend. There is no spouse, or adult children. Aaron requested 10am  for care conference. Spoke with CSI-Shelli Fajardo, who will be there for care conference. Also spoke with palliative care-Tammy Gómez, who reported that she could be there as well.    Instructed Aaron to go to patient's room and staff would lead him to the 4C conference room where conference will be. He didn't think family would need to call in, but if so, writer gave Aaron call in # [phone] with ID [id number] and Pin  [phone] Staff tomorrow will need to call number as well in the conference room and type in ID and Pin to allow family to call in and participate. This will be decided tomorrow.     There is no HCD on file, so siblings will act as NOK. Brother Aaron concerned over the fact that patient does not have a financial POA and they are unsure of how to pay for  expenses. Writer directed them to  home for guidance, but explained that there is no way to do any kind of financial POA right now. Did  convey to him that there might be a resource out there for cremation services that would be cheaper than most  [facility] and [place] [employer].      Maria Elena Garvin, MSW

## 2022-09-24 NOTE — PROGRESS NOTES
"  Cardiology ICU Progress Note    Brief HPI: Brian Brunner is a 61 year old male with unknown PMHx who was admitted on 9/12/2022 following an OOHCA.    In brief review, pt was found by a co-worker to be hanging from a forklift unresponsive. Patient was lowered to the ground, CPR started, 911 was called, AED was placed and patient received 1 shock, along with 2 mg of epinephrine and 300 mg of Amiodarone with ROSC. Again shocked upon EMS arrival with ROSC. EKG showed ST elevation in the later leads. Patient was brought to  [facility] and taken directly to [facility] where he had a coronary angiogram revealing 100% occluded mLAD s/p thrombectomy and JAMES, 80% LCx lesion s/p JAMES, 100% occluded OM2 s/p JAMES. An IABP, cooling cath and arterial lines placed and patient was admitted to the ICU for further management. Unfortunately, on 9/13, patient went into VT storm requiring several chocks, Amio started, ROSC intermittently obtained but ultimately patient was so unstable, he was cannulated onto VA ECMO on 9/13. Underwent repeat angio for PCI of residual CAD on 9/16 with PCI to PCI to the LAD, OM3, and mid LCx with patent stents in those areas. PCI s/p JAMES x1 to ostial RCA, JAMES x1 to mid LCx, JAMES x1 to distal LCx, and DESx1 to OM1. Patient was decannulated 9/19, IABP removed 9/20. Course c/b status myoclonus.     Subjective and Interval: NAEO. EEG--> shows \"runs of generalized polyspike exceeding 50% of background were recorded lasting from 14-60 minutes.  There is no accepted definition for electrographic seizure in this context; persistence of epileptiform activity exceeding 50% is widely used and was used in the study.  Following these criteria, about one third of the ongoing record consisted of electrographic seizures.  There was no clear video correlate so of these can be considered subclinical or \"nonconvulsive\" seizures.  Persistence of electrographic seizure was higher than in yesterday's study\" Other overnight events, include " hypoxia resulting in higher PEEP and FIo2 on vent. Repeat ABG pending. Yesterday, Retracted ETT, start Long acting insulin, dDecrease Fentanyl 25 mg/hr--> now up to 75     Assessment and plan by system:   Today's changes:  Discuss with neuro decreasing Versed and re-assess EEG  Diurese, lasix plus hydrochlorothiazide given hypernatremia  May need Acetazolamide if alkalosis given bicarb 30  Increase long acting insulin     Neurology   # Anoxic brain injury    # Status Myoclonus  # Chronic small vessel ischemic disease  Intubated, sedated. S/p TTM to 33 degrees. Rewarmed 9/14.   CT head without intracranial pathology. Seizures on 9/16 with status myoclonus thus started on keppra, Propofol, fentanyl and Versed, and Valproic acid. CTH repeat 9/20 unchanged. Brain MRI 9/21 with no acute infarct or abnormal intracranial enhancement, e/o chronic small vessel ischemic disease.    -vEEG 9/23: Burst suppression pattern continues with longer periods of suppression towards the end of the study.  Burst suppression continues, c/w severe diffuse encephalopathy.  Durations of suppression did increase after midazolam doses were modestly increased from 8mg to 12mg/h, indicating some sensitivity to midazolam. Runs of generalized epileptiform discharges continue indicating significant seizure tendency.  Overt subclinical electrographic seizures continue but are shorter and less frequent than in yesterday's study.  This suggests that midazolam may be effective in controlling seizure tendency in this situation.     Sedation  Fentanyl 75 mcg/h  Propofol 25 mcg/kg/min  Midazolam 12 mg/h     Anti-epileptics:   - keppra 2000 mg BID   - valproic acid 1000 mg Q8 hours   - onfi 5 mg  BID    Plan:  - Anti-epileptics and Versed per NCC  - Continue vEEG   - Care conference    Cardiovascular  # non sustained VT after revascularization now on VA ECMO and IABP  # VF Cardiac arrest 2/2 STEMI  # CAD with STEMI s/p PCI to mLAD, LCx,  OM2  # Cardiomyopathy  # Mural thrombus  As above, reportedly brief arrest with ROSC after 1 shock and ACLS meds. Coronary angiogram revealing 100% occluded mLAD s/p thrombectomy and JAMES, 80% LCx lesion s/p JAMES, 100% occluded OM2 s/p JAMES. Weaned off pressors 0200 9/13. VT storm early 9/14, cannulated on VA ECMO. Coronary angiogram repeated 9/16 for residual CAD  S/p PCI to the LAD, OM3, and mid LCx with patent stents in those areas. PCI s/p JAMES x1 to ostial RCA, JAMES x1 to mid LCx, JAMES x1 to distal LCx, and DESx1 to OM1. Patient was decannulated 9/19, IABP removed 9/20.     Intermittently on pressors. Off currently    Plan:  - Continue PO Amiodarone     - Heparin gtt for LV thrombus  - Continue ASA 81mg and ticagrelor 90mg BID  - Start GDMT when off pressors    Pulmonary  # Acute hypoxemic respiratory failure requiring intubation  # aspiration pneumonia  # Rib fractures  # pl effusions  Vent Mode: CMV/AC  (Continuous Mandatory Ventilation/ Assist Control)  FiO2 (%): 55 %  Resp Rate (Set): 18 breaths/min  Tidal Volume (Set, mL): 400 mL  PEEP (cm H2O): (S) 8 cmH2O  Resp: 24    ABG 7.42/46/58/30     Plan:  - Diurese 60 IV lasix   - Diamox prior if alkalosis  - ABG daily  - Daily CXR  - ABG after vent change   - Consider scheduled duonebs if signs of lung dz, currently PRN    - Peridex BID    Gastrointestinal, Nutrition  # Shock liver 2/2 cardiac arrest  LFTs down trending. Alk phos increasing today, will monitor  - Monitor LFTs  - bowel meds  - Nutrition: two freddy HN 40 ml hour  - BM: 9/21    Plan:  - Ramp up bowel meds, added MOM yesterday    Renal, Electrolytes  # MARIANA  # Hypoalbuminemia   # Hypernatremia  I/O's: +1.8 L  yesterday, UOP 2 L    Crt 1    Plan:  -diuresis with thiazide plus lasix  -FWF 60 ml every 4 hours  - Monitor urine output  - Avoid nephrotoxins    Hematology  # Anemia, secondary to acute blood loss and critical illness  # Mural thrombus  # Thrombocytosis  hgb stable 7.7 (7.5) plt  "455  - ASA/ticagrelor for JAMES  - Heparin gtt for LV thrombus    Endocrinology  # DM II  A1c 7.6  - start long acting insulin    Infectious Disease  # Aspiration Pneumonia  # Leukocytosis  WBC 27 (25.6) Tmax 101.3 at 2000 last night,   - Monitor for signs of infection  Cultures thus far:                - No growth to date  Antibiotics               - Vancomycin 9/13                - Zosyn 9/13-9/19    MSK/Skin  -L groin ECMO site with sutures. No hematoma, no oozing, distal pulses palpable and equal  -R groin IABP site. No hematoma, no oozing, distal pulses palpated and equal  -9/19:Oral packing removed by ENT     ICU Cares:  CXR: Unchanged  Fluids/Feeds:  TF  DVT Prophylaxis: heparin infusion  GI Prophylaxis: protonix  Bowel Regimen: senna and miralax  Anticipated Floor Transfer: NA    Lines/Tubes/Drains:  PIV X2  Arterial line  RIJ quad lumen CVC  OG  Owens  ETT    Family updated by me    Patient seen and discussed with staff physician.    Time Spent on this Encounter   I spent 65 minutes on the unit/floor managing the care of Brian D Brunner. Over 50% of my time was spent on the following:   - Counseling the patient and/or family regarding: diagnostic results, prognosis, risks and benefits of treatment options and recommended follow-up  - Coordination of care with the: consultant(s), care coordinator/ and nurse  Specifics include prognosis, medications, testing, plan of care    VLADIMIR Villagomez, CNP  [facility]  Interventional Cardiology-CSI Service  Pager [phone]     Objective:  Most recent vital signs:  /61 (BP Location: Left arm)   Pulse 110   Temp 100.4  F (38  C)   Resp 23   Ht 1.702 m (5' 7\")   Wt 106.4 kg (234 lb 9.1 oz)   SpO2 91%   BMI 36.74 kg/m    Temp:  [100.2  F (37.9  C)-101.5  F (38.6  C)] 100.4  F (38  C)  Pulse:  [] 110  Resp:  [14-26] 23  BP: (122)/(61) 122/61  MAP:  [65 mmHg-87 mmHg] 82 mmHg  Arterial Line BP: " (102-144)/(48-63) 135/58  FiO2 (%):  [30 %-55 %] 55 %  SpO2:  [90 %-97 %] 91 %  Wt Readings from Last 2 Encounters:   09/24/22 106.4 kg (234 lb 9.1 oz)       Intake/Output Summary (Last 24 hours) at 9/24/2022 1138  Last data filed at 9/24/2022 1100  Gross per 24 hour   Intake 3970.56 ml   Output 1905 ml   Net 2065.56 ml       Physical exam:  General: In bed, in NAD  HEENT: PERRL, no scleral icterus or injection  CARDIAC: RRR, no m/r/g appreciated. Peripheral pulses dopplered  RESP: Mechanical ventilation; CTAB, no wheezes, rhonchi or crackles appreciated.  GI: soft, BS hypoactive  : Owens  EXTREMITIES: NO LE edema, pulses 2+. Femoral access site w/o bleeding, dressing c/d/i.   SKIN: No acute lesions appreciated  NEURO: Intubated and sedated    Labs (Past three days):  CBC  Recent Labs   Lab 09/24/22  0304 09/23/22  1535 09/23/22  0405 09/22/22  1615   WBC 29.9* 25.9* 27.6* 25.8*   RBC 2.69* 2.57* 2.56* 2.61*   HGB 7.7* 7.5* 7.5* 7.6*   HCT 25.1* 24.2* 23.8* 24.0*   MCV 93 94 93 92   MCH 28.6 29.2 29.3 29.1   MCHC 30.7* 31.0* 31.5 31.7   RDW 16.4* 16.2* 16.0* 15.9*   * 381 338 292     BMP  Recent Labs   Lab 09/24/22  1049 09/24/22  0958 09/24/22  0852 09/24/22  0800 09/24/22  0310 09/24/22  0304 09/23/22  1556 09/23/22  1535 09/23/22  0454 09/23/22  0405 09/22/22  2305 09/22/22  2212 09/22/22  1809 09/22/22  1615 09/22/22  0358 09/22/22  0310 09/21/22  0403 09/21/22  0357   NA  --   --   --   --   --  149*  --  151*  --  148*  --  148*  --  151*  --  147*   < > 151*   POTASSIUM  --   --   --   --   --  4.5  --  4.3  --  3.8  --  3.6  --  3.9  --  4.0   < > 4.3   CHLORIDE  --   --   --   --   --  114*  --  114*  --  112*  --  110*  --  112*  --  111*   < > 114*   CO2  --   --   --   --   --  29  --  26  --  29  --  27  --  25  --  27   < > 31*   ANIONGAP  --   --   --   --   --  6*  --  11  --  7  --  11  --  14  --  9   < > 6*   * 120* 124* 150*   < > 117*   < > 137*   < > 169*   < > 171*   < > 101*    < > 146*   < > 141*   BUN  --   --   --   --   --  50.1*  --  50.2*  --  48.5*  --  46.6*  --  47.1*  --  43.8*   < > 53.2*   CR  --   --   --   --   --  1.00  --  1.04  --  1.11  --  1.04  --  0.98  --  0.95   < > 1.03   GFRESTIMATED  --   --   --   --   --  86  --  82  --  76  --  82  --  88  --  >90   < > 83   JACKIE  --   --   --   --   --  8.8  --  8.9  --  8.8  --  8.6*  --  9.0  --  8.4*   < > 8.3*   MAG  --   --   --   --   --  2.3  --  2.2  --  2.1  --   --   --  2.3  --  2.2   < > 2.3   PHOS  --   --   --   --   --  3.2  --   --   --  3.9  --   --   --   --   --  3.2  --  3.7    < > = values in this interval not displayed.     Troponins:     INR  Recent Labs   Lab 22  0304 22  0405 22  0310 22  1559   INR 1.16* 1.18* 1.16* 1.17*     Liver panel  Recent Labs   Lab 22  03022  1535 22  0405 22  2212   PROTTOTAL 5.9* 5.8* 5.6* 5.4*   ALBUMIN 2.5* 2.6* 2.4* 2.5*   BILITOTAL 0.3 0.3 0.3 0.3   ALKPHOS 148* 144* 146* 139*   AST 81* 82* 77* 73*   ALT 50 46 42 38       Imaging/procedure results:  EEG Video 12-26 hr Unmonitored  EEG Video 12-26 hr Unmonitored Result    VIDEO EEG DATE: 22  VIDEO EEG [id number]  VIDEO EEG DAY#: 11  VIDEO EEG SOURCE FILE DURATION: 23 hours and 56 minutes    PATIENT HISTORY: 61-year old status post cardiac arrest.  The previous   video monitoring sessions have shown overt myoclonic status epilepticus.    There were also frequent electrographic seizures.  Video monitoring is   obtained to quantify seizure activity so as to direct treatment.  Patient   is being treated with levetiracetam, clobazam, divalproex.  Sedative drips   included fentanyl 150 mcg/h, midazolam 12 mg/h, and propofol 25   mcg/kg/min.    TECHNICAL SUMMARY: This is a video-EEG monitoring study. EEG was recorded   from 23 scalp electrodes placed according to the 10-20 international   system. Additional electrodes were utilized for referencing, artifact   detection,  and recording from other cerebral regions. Qualified   technicians attached EEG electrodes, set up the study, monitored and   reviewed EEG recordings, and disconnected EEG electrodes when appropriate.   Video was continuously recorded. Video was reviewed for clinical   correlation and to assist with EEG interpretations.     BACKGROUND ACTIVITY: A discontinuous atypical burst suppression  recorded   during most of the study.  40  V 3 to 4 Hz activity in runs as long as 5   seconds alternates with longer periods of intense suppression.    Persistence of suppression during these portions of the recording is in   the range of 60% or so.  Generalized spikes and polyspikes are intermixed   but are sparse.  During other portions of the recording, generalized   epileptiform activity in the form of polyspikes of the various duration   (one half a 2nd-3 seconds) occupy more than 50% of the recording.  These   do not evolve in manner consistent with electrographic seizure.  However,   persistence of epileptiform activity exceeding 50% of ongoing record is   considered electrographic seizure in the study.    ACTIVATION PROCEDURE: Stimulation is performed utilizing ICU protocol   around 9 AM on 9/23.  This does not induce any changes in background.    OTHER INTERICTAL ABNORMALITIES: See above.    ICTAL ABNORMALITIES: For purposes of this study, persistence of   generalized epileptiform discharges greater than 50% for several sustained   15-second page is is considered electrographic seizures.  Electrographic   seizures following these criteria were noted at 5:50 AM (22 minutes), 7:40   AM (29 minutes), 8:56 AM (14 minutes), 10 AM (45 minutes), 3:48 PM (45   minutes), 5:20 PM (40 minutes), 7:45 PM (55 minutes), 9/23.    Electrographic seizures meeting these criteria were seen at 00: 34 a.m.   (60 minutes), and 3 AM (60 minutes), on 9/24.  Altogether, electrographic   seizures following these criteria occupied about one third of  "ongoing   background.  This is an increase compared to yesterday's study.  Video was   reviewed during some of these bursts and no clear clinical correlate was   noted.    IMPRESSION: Severely abnormal.  1) a fairly intense a typical burst suppression pattern is noted.  This is   consistent with severe diffuse encephalopathy.  It may in part be related   to the sedative drips employed.  2) runs of generalized polyspike exceeding 50% of background were recorded   lasting from 14-60 minutes.  There is no excepted definition for   electrographic seizure in this context; persistence of epileptiform   activity exceeding 50% is widely used and was used in the study.    Following these criteria, about one third of the ongoing record consisted   of electrographic seizures.  There was no clear video correlate so of   these can be considered subclinical or \"nonconvulsive\" seizures.    Persistence of electrographic seizure was higher than in yesterday's   study.    Miguel Angel Joiner MD  EPILEPSY STAFF   XR Chest Port 1 View  Narrative: EXAM: XR CHEST PORT 1 VIEW  9/24/2022 5:52 AM     HISTORY:  ETT positioning       COMPARISON:  Chest x-ray 9/22/2022    FINDINGS: AP radiograph of the chest. Endotracheal tube projecting 2.7  cm above the lexy. Partially visualized enteric tube coursing into  the stomach. Right IJ central venous catheter with tip overlying the  right brachiocephalic confluence.     Stable enlargement of the cardiomediastinal silhouette. No  pneumothorax. Probable layering bilateral pleural effusions. Increased  hazy perihilar and bibasilar opacities. Visualized upper abdomen is  within normal limits  Impression: IMPRESSION:    1. Endotracheal tube projects approximately 2.7 cm above the lexy.  2. Increased perihilar and bibasilar hazy opacities likely  representing increasing pulmonary edema and/or atelectasis.  3. Probable layering bilateral pleural effusions.    I have personally reviewed the " examination and initial interpretation  and I agree with the findings.    TATY CLINTON,          SYSTEM ID:  [id number]

## 2022-09-24 NOTE — PLAN OF CARE
Goal Outcome Evaluation:          Overall Patient Progress: declining    Outcome Evaluation: RASS -5. Spiked fever, Increased FiO2 needs. Increased sedation needs    ICU End of Shift Summary. See flowsheets for vital signs and detailed assessment.    Changes this shift:  Remains unresponsive to stimuli. FiO2 increased up to 55% based on ABG and saturations. Increased fentanyl to 75 for vent synchrony. Febrile, Tmax up to 101.6, tylenol and ice utilized. Still having frequent PVCs. No BM overnight. Insulin gtt running in alg 4+3. Lantus initiated. Owens with good urine output.     Plan: Continue plan of care.

## 2022-09-24 NOTE — PLAN OF CARE
Goal Outcome Evaluation:      ICU End of Shift Summary. See flowsheets for vital signs and detailed assessment.    Changes this shift:  Levo restarted for hypotension. Fever reported to CVTS, BC, UC, and resp cultures sent off. No other acute changes.      Plan: Care conference tomorrow 10am

## 2022-09-24 NOTE — PROGRESS NOTES
"[facility]: [place]  NeuroCritical Care Progress Note    Patient Name:  Brian D Brunner  MRN:  [id number]    :  1960  Date of Service:  2022  Primary care provider:  No primary care provider on file.    Assessment/Plan:  Brian D Brunner is a 61 year old male with a unknown past medical history of admitted on 2022 for Cardiac arrest.  pt was being warmed, rearrested and was cannulated for VA ECMO. NCC consulted for concerns for seizures.     vEEG :    Study continues Severely abnormal.    1) a fairly intense a typical burst suppression pattern is noted.  This is consistent with severe diffuse encephalopathy.  It may in part be related to the sedative drips employed.  2) runs of generalized polyspike exceeding 50% of background were recorded lasting from 14-60 minutes.  There is no accepted definition for electrographic seizure in this context; persistence of epileptiform activity exceeding 50% is widely used and was used in the study.  Following these criteria, about one third of the ongoing record consisted of electrographic seizures.  There was no clear video correlate so of these can be considered subclinical or \"nonconvulsive\" seizures.  Persistence of electrographic seizure was higher than in yesterday's study.    CT head :  Impression:  1. No acute intracranial pathology.   2. Unchanged mucosal thickening throughout the paranasal sinuses.    MRI brain with and without    Impression:  1. No definite temporal lobe abnormality, mass, or congenital lesion  identified as a cause of the patient's seizures.  2. No acute infarct or abnormal intracranial enhancement.  3. Findings suggestive of chronic small vessel ischemic disease.    NEURO RECS  - Continue vEEG   - Continue Onfi 10 mg BID   - Continue Keppra to 2 grams BID   - Continue VPA 1 gram every 8 hours   - Continue Propofol drip to 25 mcg/kg/minute set rate, no titration   - Continue Midazolam to " "12 mg/hour   - NPI Q4H, call NCC if < 3.0  - We will continue to follow and monitor their EEG and neurologic exam, please call #[phone] with any questions    If the family wishes to transition to comfort care and support is withdrawn it would be reasonable to continue AED medications including midazolam drip as if these medications are stopped he could develop status seizures.    The patient was seen and discussed with NCC attending, Dr. Stewart Starks, and he is in agreement with the above assessment, plan, and recommendations.     VLADIMIR Donaldson, CNP  Neurocritical Care *[phone]    Physical Examination:   /61 (BP Location: Left arm)   Pulse 105   Temp 100.2  F (37.9  C) (Bladder)   Resp 20   Ht 1.702 m (5' 7\")   Wt 106.4 kg (234 lb 9.1 oz)   SpO2 95%   BMI 36.74 kg/m    General: Adult male patient, lying in bed, critically-ill.  HEENT: Normocephalic, atraumatic, no icterus, oral cavity/oropharynx pink and moist, ETT present.   Cardiac: RRR on bedside monitor.  Pulm: Synchronous with ventilator.   Abdomen: Non-distended.   Extremities: Appears adequately perfused.   Skin: No obvious rashes or lesions on exposed skin.   Psych: Sedated  Neuro:  Pt is deeply sedated. Pupils equal this AM.    I have personally reviewed all labs and imaging for this patient.    "

## 2022-09-25 NOTE — SIGNIFICANT EVENT
SPIRITUAL HEALTH SERVICES Significant Event  [employer] of ANOINTING  [facility] ([place] 4c      Pt anointed by Father Hayden Willams   Pager [phone]

## 2022-09-25 NOTE — PHARMACY-ADMISSION MEDICATION HISTORY
Admission Medication History Completed by Pharmacy    See [facility] Admission Navigator for allergy information, preferred outpatient pharmacy, prior to admission medications and immunization status.     Patient transitioning to comfort cares, not appropriate for medication reconciliation at this time      Date completed: 09/25/22    Medication history completed by: Sanjuana Platt, [employer]

## 2022-09-25 NOTE — CARE CONFERENCE
Care Conference    Met with patient's family as well as Shelli Fajardo NP for care conference for Lisandro.  They were provided a medical update from the primary team and questions answered.  They all agree that Lisandro would not want to live this way and would like to transition to a comfort plan with the understanding that Lisandro will pass away.  Code status changed to DNR.  They have a few people who do want to say goodbye and this will likely be completed in the next 24 hours.  Once all friends/family have had the opportunity to say goodbye the patient will be compassionately extubated.    Tammy Gómez MD   Palliative Care Fellow

## 2022-09-25 NOTE — PROGRESS NOTES
[facility]  Palliative Care Daily Progress Note       Recommendations & Counseling     Goals: Family meeting today. Plan for transition to comfort care with compassionate extubation likely in the next 24 hours when all friends/family can say goodbye.      Assessments          Brian Brunner is a 61 year old male s/p cardiac arrest on 9/12 who underwent thrombectomy and JAMES x3 for occlusions of MLAD, LCx, and OM2 in cardiac cath lab. Went into VT storm while recovering from revascularization in the ICU, requiring cannulation for V-A ECMO on 9/13.     Patient has had worsening persistent seizures despite increasing doses of antiepileptics with anoxic brain injury.    Today, the patient was seen for:  Support  Seizures  Cardiogenic Shock    Prognosis, Goals, or Advance Care Planning was addressed today with: Yes.  Mood, coping, and/or meaning in the context of serious illness were addressed today: Yes.  Summary/Comments:            Interval History:     Chart review/discussion with unit or clinical team members:   Discussed with primary team before family meeting as well as bedside nurse.    Per patient or family/caregivers today:  Met with patient's family for care conference.  They have elected to transition to comfort care after friends/family can say goodbye.  They are understandably sad but coping as good as can be expected for this situation.    Key Palliative Symptoms:  We are not managing pain in this patient  We are not managing dyspnea in this patient  We are not managing nausea in this patient    Patient is on opioids: bowels not assessed today.           Review of Systems:     Unable to obtain ROS as patient is completely unresponsive and ventilated.          Medications:     I have reviewed this patient's medication profile and medications during this hospitalization.    Noted meds:    Fentanyl  Versed  Propofol  Epinephrine  norepinephrine           Physical  Exam:   Vitals were reviewed  Temp: 98.8  F (37.1  C) Temp src: Bladder   Pulse: 91   Resp: 14 SpO2: 98 % O2 Device: Mechanical Ventilator    General: Intubated and sedated  HEENT: ETT in place  Respiratory: Breathing via mechanical ventilation  Neuro: Unresponsive             Data Reviewed:     Reviewed recent pertinent imaging, comments:   Reviewed.    Reviewed recent labs, comments:   ROUTINE ICU LABS (Last four results)  CMPRecent Labs   Lab 09/25/22  1344 09/25/22  1315 09/25/22  1212 09/25/22  1059 09/25/22  0352 09/25/22  0350 09/24/22  2019 09/24/22 2015 09/24/22  1646 09/24/22  1550 09/24/22  0310 09/24/22  0304 09/23/22  1556 09/23/22  1535 09/23/22  0454 09/23/22  0405 09/22/22  0358 09/22/22  0310   NA  --   --   --   --   --  161*  --  151*  --  148*  --  149*  --  151*  --  148*   < > 147*   POTASSIUM  --   --   --   --   --  4.5  --  4.2  --  3.8  --  4.5  --  4.3  --  3.8   < > 4.0   CHLORIDE  --   --   --   --   --  121*  --  113*  --  111*  --  114*  --  114*  --  112*   < > 111*   CO2  --   --   --   --   --  31*  --  29  --  27  --  29  --  26  --  29   < > 27   ANIONGAP  --   --   --   --   --  9  --  9  --  10  --  6*  --  11  --  7   < > 9   * 96 113* 147*   < > 108*   < > 130*   < > 120*   < > 117*   < > 137*   < > 169*   < > 146*   BUN  --   --   --   --   --  57.6*  --  55.9*  --  54.5*  --  50.1*  --  50.2*  --  48.5*   < > 43.8*   CR  --   --   --   --   --  1.05  --  1.10  --  1.07  --  1.00  --  1.04  --  1.11   < > 0.95   GFRESTIMATED  --   --   --   --   --  81  --  77  --  79  --  86  --  82  --  76   < > >90   JACKIE  --   --   --   --   --  9.0  --  9.0  --  9.1  --  8.8  --  8.9  --  8.8   < > 8.4*   MAG  --   --   --   --   --  2.4*  --   --   --  2.2  --  2.3  --  2.2  --  2.1   < > 2.2   PHOS  --   --   --   --   --  3.4  --   --   --   --   --  3.2  --   --   --  3.9  --  3.2   PROTTOTAL  --   --   --   --   --  5.7*  --   --   --  5.9*  --  5.9*  --  5.8*  --  5.6*   < >  5.3*   ALBUMIN  --   --   --   --   --  2.5*  --   --   --  2.5*  --  2.5*  --  2.6*  --  2.4*   < > 2.6*   BILITOTAL  --   --   --   --   --  0.2  --   --   --  0.3  --  0.3  --  0.3  --  0.3   < > 0.3   ALKPHOS  --   --   --   --   --  121  --   --   --  136*  --  148*  --  144*  --  146*   < > 130*   AST  --   --   --   --   --  55*  --   --   --  66*  --  81*  --  82*  --  77*   < > 50   ALT  --   --   --   --   --  39  --   --   --  45  --  50  --  46  --  42   < > 24    < > = values in this interval not displayed.     CBC  Recent Labs   Lab 09/25/22 0350 09/24/22 1550 09/24/22 0304 09/23/22  1535   WBC 28.0* 32.6* 29.9* 25.9*   RBC 2.53* 2.50* 2.69* 2.57*   HGB 7.2* 7.3* 7.7* 7.5*   HCT 24.2* 23.4* 25.1* 24.2*   MCV 96 94 93 94   MCH 28.5 29.2 28.6 29.2   MCHC 29.8* 31.2* 30.7* 31.0*   RDW 16.6* 16.5* 16.4* 16.2*   * 484* 455* 381     INR  Recent Labs   Lab 09/25/22 0350 09/24/22 0304 09/23/22  0405 09/22/22  0310   INR 1.18* 1.16* 1.18* 1.16*     Arterial Blood Gas  Recent Labs   Lab 09/25/22 0350 09/24/22  1550 09/24/22  0304 09/23/22  1535   PH 7.41 7.54* 7.42 7.41   PCO2 50* 36 46* 46*   PO2 78* 65* 58* 66*   HCO3 32* 31* 30* 29*   O2PER 60 55 45 30          Patient seen and evaluated with Dr. Gómez.    Agree with assessment and recommendations as documented in this note. Patient seen for goals of care, decision made to withdraw life support with expectation that patient will not survive. Provided family support. Billing based on complexity regarding severe anoxic brain injury after cardiac arrest, presumed due to ischemic event. Decision around care plan, end of life cares.     Shoshana Gagnon MD  Palliative Medicine  Pager [phone]

## 2022-09-25 NOTE — PROGRESS NOTES
Cardiology ICU Progress Note    Brief HPI: Brian Brunner is a 61 year old male with unknown PMHx who was admitted on 9/12/2022 following an OOHCA.    In brief review, pt was found by a co-worker to be hanging from a forklift unresponsive. Patient was lowered to the ground, CPR started, 911 was called, AED was placed and patient received 1 shock, along with 2 mg of epinephrine and 300 mg of Amiodarone with ROSC. Again shocked upon EMS arrival with ROSC. EKG showed ST elevation in the later leads. Patient was brought to  [facility] and taken directly to [facility] where he had a coronary angiogram revealing 100% occluded mLAD s/p thrombectomy and JAMES, 80% LCx lesion s/p JAMES, 100% occluded OM2 s/p JAMES. An IABP, cooling cath and arterial lines placed and patient was admitted to the ICU for further management. Unfortunately, on 9/13, patient went into VT storm requiring several chocks, Amio started, ROSC intermittently obtained but ultimately patient was so unstable, he was cannulated onto VA ECMO on 9/13. Underwent repeat angio for PCI of residual CAD on 9/16 with PCI to PCI to the LAD, OM3, and mid LCx with patent stents in those areas. PCI s/p JAMES x1 to ostial RCA, JAMES x1 to mid LCx, JAMES x1 to distal LCx, and DESx1 to OM1. Patient was decannulated 9/19, IABP removed 9/20. Course c/b status myoclonus.     Subjective and Interval: NAEO. Sodium elevated as is BP, hydrochlorothiazide given, yesterday, diuresed, (still very net +) still seizing despite all medications. Fio2 up to 60%    Assessment and plan by system:   Today's changes:  Care conference  Diamox now  GIP consult pending care conference?  Could Remove RIJ  hydrochlorothiazide 50 mg now for hypertension, hypernatremia and hypervolemia  Vent change, increase TV, especially since giving Diamox for   Increase  q2h    # Addendum: transition to comfort tomorrow. DNR status now.  for last rights, spiritual consult placed.     Neurology   # Anoxic brain injury    #  Status Myoclonus  # Chronic small vessel ischemic disease  Intubated, sedated. S/p TTM to 33 degrees. Rewarmed 9/14.   CT head without intracranial pathology. Seizures on 9/16 with status myoclonus thus started on keppra, Propofol, fentanyl and Versed, and Valproic acid. CTH repeat 9/20 unchanged. Brain MRI 9/21 with no acute infarct or abnormal intracranial enhancement, e/o chronic small vessel ischemic disease.    -vEEG 9/23: Burst suppression pattern continues with longer periods of suppression towards the end of the study.  Burst suppression continues, c/w severe diffuse encephalopathy.  Durations of suppression did increase after midazolam doses were modestly increased from 8mg to 12mg/h, indicating some sensitivity to midazolam. Runs of generalized epileptiform discharges continue indicating significant seizure tendency.  Overt subclinical electrographic seizures continue but are shorter and less frequent than in yesterday's study.  This suggests that midazolam may be effective in controlling seizure tendency in this situation.     Sedation  Fentanyl 75 mcg/h  Propofol 25 mcg/kg/min  Midazolam 12 mg/h     Anti-epileptics:   - keppra 2000 mg BID   - valproic acid 1000 mg Q8 hours   - onfi 5 mg  BID    Plan:  - Anti-epileptics and Versed per NCC  - Continue vEEG   - Care conference  - GIP consult    Cardiovascular  # non sustained VT after revascularization now on VA ECMO and IABP  # VF Cardiac arrest 2/2 STEMI  # CAD with STEMI s/p PCI to mLAD, LCx, OM2  # Cardiomyopathy  # Mural thrombus  As above, reportedly brief arrest with ROSC after 1 shock and ACLS meds. Coronary angiogram revealing 100% occluded mLAD s/p thrombectomy and JAMES, 80% LCx lesion s/p JAMES, 100% occluded OM2 s/p JAMES. Weaned off pressors 0200 9/13. VT storm early 9/14, cannulated on VA ECMO. Coronary angiogram repeated 9/16 for residual CAD  S/p PCI to the LAD, OM3, and mid LCx with patent stents in those areas. PCI s/p JAMES x1 to ostial RCA, JAMES  x1 to mid LCx, JAMES x1 to distal LCx, and DESx1 to OM1. Patient was decannulated 9/19, IABP removed 9/20.     Off currently    Plan:  - Continue PO Amiodarone     - Heparin gtt for LV thrombus  - Continue ASA 81mg and ticagrelor 90mg BID  - Start GDMT when off pressors    Pulmonary  # Acute hypoxemic respiratory failure requiring intubation  # aspiration pneumonia  # Rib fractures  # pl effusions  Vent Mode: CMV/AC  (Continuous Mandatory Ventilation/ Assist Control)  FiO2 (%):60 %  Tidal Volume (Set, mL): 400 mL  PEEP (cm H2O): (S) 6 cmH2O  Resp: 24    ABG 7.41/50/78/32     Plan:  - Diurese 60 IV lasix   - Diamox prior if alkalosis  - ABG daily  - Daily CXR  - ABG after vent change   - Consider scheduled duonebs if signs of lung dz, currently PRN    - Peridex BID    Gastrointestinal, Nutrition  # Shock liver 2/2 cardiac arrest  LFTs down trending. Alk phos increasing today, will monitor  - Monitor LFTs  - bowel meds  - Nutrition: two freddy HN 40 ml hour  - BM: 9/21    Plan:  - Continue bowel meds  - Add suppository PRN as needed    Renal, Electrolytes  # MARIANA  # Hypoalbuminemia   # Hypernatremia  I/O's: +1.5 L  yesterday, UOP 2.7 L    Crt 1    Plan:  -diuresis with thiazide plus lasix  -FWF 60 ml every 4 hours  - Monitor urine output  - Avoid nephrotoxins    Hematology  # Anemia, secondary to acute blood loss and critical illness  # Mural thrombus  # Thrombocytosis  hgb stable 7.7 (7.5) plt 455  - ASA/ticagrelor for JAMES  - Heparin gtt for LV thrombus    Endocrinology  # DM II  A1c 7.6  - start long acting insulin    Infectious Disease  # Aspiration Pneumonia  # Leukocytosis  WBC 28 (27) tmax 101.8   Tmax 101.3 at 2000 last night,   - Monitor for signs of infection  Cultures thus far:                - No growth to date  Antibiotics               - Vancomycin 9/13                - Zosyn 9/13-9/19    MSK/Skin  -L groin ECMO site with sutures. No hematoma, no oozing, distal pulses palpable and equal  -R groin IABP  "site. No hematoma, no oozing, distal pulses palpated and equal  -9/19:Oral packing removed by ENT     ICU Cares:  CXR: Unchanged  Fluids/Feeds:  TF  DVT Prophylaxis: heparin infusion  GI Prophylaxis: protonix  Bowel Regimen: senna and miralax  Anticipated Floor Transfer: NA    Lines/Tubes/Drains:  PIV X2  Arterial line  RIJ quad lumen CVC  OG  [place]  ETT    Family updated by me    Patient seen and discussed with staff physician.    Time Spent on this Encounter   I spent 65 minutes on the unit/floor managing the care of Brian D Brunner. Over 50% of my time was spent on the following:   - Counseling the patient and/or family regarding: diagnostic results, prognosis, risks and benefits of treatment options and recommended follow-up  - Coordination of care with the: consultant(s), care coordinator/ and nurse  Specifics include prognosis, medications, testing, plan of care    VLADIMIR Villagomez, CNP  [facility]  Interventional Cardiology-CSI Service  Pager [phone]     Objective:  Most recent vital signs:  /61 (BP Location: Left arm)   Pulse 94   Temp 99.1  F (37.3  C)   Resp 19   Ht 1.702 m (5' 7\")   Wt 107.5 kg (236 lb 15.9 oz)   SpO2 96%   BMI 37.12 kg/m    Temp:  [98.8  F (37.1  C)-101.8  F (38.8  C)] 99.1  F (37.3  C)  Pulse:  [] 94  Resp:  [14-27] 19  MAP:  [62 mmHg-200 mmHg] 69 mmHg  Arterial Line BP: ()/() 111/52  FiO2 (%):  [55 %-60 %] 60 %  SpO2:  [90 %-100 %] 96 %  Wt Readings from Last 2 Encounters:   09/25/22 107.5 kg (236 lb 15.9 oz)         Intake/Output Summary (Last 24 hours) at 9/25/2022 1042  Last data filed at 9/25/2022 1037  Gross per 24 hour   Intake 3928.48 ml   Output 2370 ml   Net 1558.48 ml       Physical exam:  General: In bed, in NAD  HEENT: PERRL, no scleral icterus or injection  CARDIAC: RRR, no m/r/g appreciated. Peripheral pulses dopplered  RESP: Mechanical ventilation; CTAB, no wheezes, rhonchi or crackles " appreciated.  GI: soft, BS hypoactive  : [place]  EXTREMITIES: NO LE edema, pulses 2+. Femoral access site w/o bleeding, dressing c/d/i.   SKIN: No acute lesions appreciated  NEURO: Intubated and sedated    Labs (Past three days):  CBC  Recent Labs   Lab 09/25/22  0350 09/24/22  1550 09/24/22  0304 09/23/22  1535   WBC 28.0* 32.6* 29.9* 25.9*   RBC 2.53* 2.50* 2.69* 2.57*   HGB 7.2* 7.3* 7.7* 7.5*   HCT 24.2* 23.4* 25.1* 24.2*   MCV 96 94 93 94   MCH 28.5 29.2 28.6 29.2   MCHC 29.8* 31.2* 30.7* 31.0*   RDW 16.6* 16.5* 16.4* 16.2*   * 484* 455* 381     BMP  Recent Labs   Lab 09/25/22  0808 09/25/22  0652 09/25/22  0459 09/25/22  0352 09/25/22  0350 09/24/22  2019 09/24/22 2015 09/24/22  1646 09/24/22  1550 09/24/22  0310 09/24/22  0304 09/23/22  1556 09/23/22  1535 09/23/22  0454 09/23/22  0405 09/22/22  0358 09/22/22  0310   NA  --   --   --   --  161*  --  151*  --  148*  --  149*  --  151*  --  148*   < > 147*   POTASSIUM  --   --   --   --  4.5  --  4.2  --  3.8  --  4.5  --  4.3  --  3.8   < > 4.0   CHLORIDE  --   --   --   --  121*  --  113*  --  111*  --  114*  --  114*  --  112*   < > 111*   CO2  --   --   --   --  31*  --  29  --  27  --  29  --  26  --  29   < > 27   ANIONGAP  --   --   --   --  9  --  9  --  10  --  6*  --  11  --  7   < > 9   * 129* 110* 107* 108*   < > 130*   < > 120*   < > 117*   < > 137*   < > 169*   < > 146*   BUN  --   --   --   --  57.6*  --  55.9*  --  54.5*  --  50.1*  --  50.2*  --  48.5*   < > 43.8*   CR  --   --   --   --  1.05  --  1.10  --  1.07  --  1.00  --  1.04  --  1.11   < > 0.95   GFRESTIMATED  --   --   --   --  81  --  77  --  79  --  86  --  82  --  76   < > >90   JACKIE  --   --   --   --  9.0  --  9.0  --  9.1  --  8.8  --  8.9  --  8.8   < > 8.4*   MAG  --   --   --   --  2.4*  --   --   --  2.2  --  2.3  --  2.2  --  2.1   < > 2.2   PHOS  --   --   --   --  3.4  --   --   --   --   --  3.2  --   --   --  3.9  --  3.2    < > = values in this interval  not displayed.     Troponins:     INR  Recent Labs   Lab 09/25/22  0350 09/24/22  0304 09/23/22  0405 09/22/22  0310   INR 1.18* 1.16* 1.18* 1.16*     Liver panel  Recent Labs   Lab 09/25/22  0350 09/24/22  1550 09/24/22  0304 09/23/22  1535   PROTTOTAL 5.7* 5.9* 5.9* 5.8*   ALBUMIN 2.5* 2.5* 2.5* 2.6*   BILITOTAL 0.2 0.3 0.3 0.3   ALKPHOS 121 136* 148* 144*   AST 55* 66* 81* 82*   ALT 39 45 50 46       Imaging/procedure results:  XR Chest Port 1 View  Narrative: EXAM: XR CHEST PORT 1 VIEW  9/25/2022 6:34 AM     HISTORY:  ETT positioning       COMPARISON:  Chest x-ray 9/24/2022    FINDINGS: AP radiograph of the chest. Endotracheal tube projecting  over the midthoracic trachea. Partially visualized enteric tube  coursing into the stomach. Right IJ central venous catheter with tip  overlying the right brachiocephalic confluence.     Stable enlargement of the cardiomediastinal silhouette. No  pneumothorax. Layering bilateral pleural effusions. Unchanged hazy  perihilar and bibasilar opacities. Visualized upper abdomen is within  normal limits  Impression: IMPRESSION:    1. Endotracheal tube projects approximately 2.7 cm above the lexy.  2. Unchanged hazy perihilar and bibasilar opacities, likely  representing increasing pulmonary edema and/or atelectasis.  3. Stable layering bilateral pleural effusions.    I have personally reviewed the examination and initial interpretation  and I agree with the findings.    TATY CLINTON,          SYSTEM ID:  [id number]

## 2022-09-26 NOTE — PROGRESS NOTES
SPIRITUAL HEALTH SERVICES  SPIRITUAL ASSESSMENT Progress Note (Palliative Focus)  [facility] ([place] 4C    REFERRAL SOURCE: Staff request.    Visited patient Brian Brunner's bedside; no family present. Per RN, plan is to transition to comfort measure sonly today without family present. Family visited yesterday, and Lisandro was anointed by  .    Plan: No follow up planned unless spiritual or meaning-based support is requested; I am available for support as needed while Palliative Care is consulted.    Heena Wu  Palliative   Pager [phone]  [facility] Inpatient Team Consult pager [phone] (M-F 8-4:30)  After-hours Answering Service [phone]

## 2022-09-26 NOTE — PROGRESS NOTES
[facility]  Palliative Care Daily Progress Note       Recommendations & Counseling     Goals of care:  -Due to need for multiple antiepileptics for refractory seizures and over all poor prognosis, plan for comfort care measures  -Stop all lab draws, scheduled procedures, and vital checks  -Plan for compassionate extubation with comfort care today. Discussed with team, comfort orders reviewed.  -Continue antiepileptic medications. Stop all pressors. Bolus with Fentanyl 50-75 mcg prior to extubation and closely monitor for symptoms of pain, dyspnea, and agitation and medicate with opoid first with benzodiazepine being second  -Robinul for secretions  -Family did not desire to be present for compassionate extubation    Tammy Gómez MD   Palliative Care Fellow      Assessments          Brian Brunner is a 61 year old male s/p cardiac arrest on 9/12 who underwent thrombectomy and JAMES x3 for occlusions of MLAD, LCx, and OM2 in cardiac cath lab. Went into VT storm while recovering from revascularization in the ICU, requiring cannulation for V-A ECMO on 9/13.     Patient has had worsening persistent seizures despite increasing doses of antiepileptics with anoxic brain injury.    Today plan for compassionate extubation to comfort cares.    Today, the patient was seen for:  Support  Seizures  Cardiogenic Shock  Support  Comfort measures    Prognosis, Goals, or Advance Care Planning was addressed today with: Yes.  Mood, coping, and/or meaning in the context of serious illness were addressed today: Yes.  Summary/Comments:            Interval History:     Chart review/discussion with unit or clinical team members:   Discussed with bedside nurse and primary team. Plan to transition to comfort care today, family will not be present.    Per patient or family/caregivers today:  Patient remains intubated and unresponsive.  Primary team has communicated with patient's family, they  have all had time to say goodbye and will not be present at time of extubation.    Key Palliative Symptoms:  We are not managing pain in this patient  We are not managing dyspnea in this patient  We are not managing nausea in this patient    Patient is on opioids: bowels not assessed today.           Review of Systems:     Unable to obtain ROS as patient is completely unresponsive and ventilated.          Medications:     I have reviewed this patient's medication profile and medications during this hospitalization.    Noted meds:    Fentanyl drip with PRN 50 mcg IV  Versed drip with PRN 1-3 mg IV  Propofol  Norepinephrine - stopped  Robinul 0.2 mg IV q30 min           Physical Exam:   Vitals were reviewed  Temp: 100.2  F (37.9  C) Temp src: Bladder BP: 130/66 Pulse: 100   Resp: 16 SpO2: 99 % O2 Device: Mechanical Ventilator    General: Intubated and sedated  HEENT: ETT in place  Cardiovascular: RRR, normal S1 and S2, no m/g/r, 1+ bilateral LE edema  Abdomen: Soft, normoactive BS  Respiratory: Breathing via mechanical ventilation  Neuro: Unresponsive             Data Reviewed:     Reviewed recent pertinent imaging, comments:   Reviewed.    Reviewed recent labs, comments:   ROUTINE ICU LABS (Last four results)  CMP  Recent Labs   Lab 09/26/22  0830 09/26/22  0656 09/26/22  0604 09/26/22  0458 09/26/22  0400 09/25/22  1710 09/25/22  1701 09/25/22  0352 09/25/22  0350 09/24/22  2019 09/24/22 2015 09/24/22  1646 09/24/22  1550 09/24/22  0310 09/24/22  0304 09/23/22  0454 09/23/22  0405   NA  --   --   --   --  151*  --  148*  --  161*  --  151*  --  148*  --  149*   < > 148*   POTASSIUM  --   --   --   --  3.8  --  3.9  --  4.5  --  4.2  --  3.8  --  4.5   < > 3.8   CHLORIDE  --   --   --   --  112*  --  110*  --  121*  --  113*  --  111*  --  114*   < > 112*   CO2  --   --   --   --  32*  --  30*  --  31*  --  29  --  27  --  29   < > 29   ANIONGAP  --   --   --   --  7  --  8  --  9  --  9  --  10  --  6*   < > 7    * 123* 106* 123* 100*  104*   < > 111*   < > 108*   < > 130*   < > 120*   < > 117*   < > 169*   BUN  --   --   --   --  60.0*  --  58.7*  --  57.6*  --  55.9*  --  54.5*  --  50.1*   < > 48.5*   CR  --   --   --   --  1.18*  --  1.08  --  1.05  --  1.10  --  1.07  --  1.00   < > 1.11   GFRESTIMATED  --   --   --   --  71  --  79  --  81  --  77  --  79  --  86   < > 76   JACKIE  --   --   --   --  8.9  --  8.8  --  9.0  --  9.0  --  9.1  --  8.8   < > 8.8   MAG  --   --   --   --  2.2  --  2.3  --  2.4*  --   --   --  2.2  --  2.3   < > 2.1   PHOS  --   --   --   --  3.5  --   --   --  3.4  --   --   --   --   --  3.2  --  3.9   PROTTOTAL  --   --   --   --  5.8*  --  5.9*  --  5.7*  --   --   --  5.9*  --  5.9*   < > 5.6*   ALBUMIN  --   --   --   --  2.5*  --  2.4*  --  2.5*  --   --   --  2.5*  --  2.5*   < > 2.4*   BILITOTAL  --   --   --   --  0.3  --  0.2  --  0.2  --   --   --  0.3  --  0.3   < > 0.3   ALKPHOS  --   --   --   --  115  --  117  --  121  --   --   --  136*  --  148*   < > 146*   AST  --   --   --   --  47  --  50  --  55*  --   --   --  66*  --  81*   < > 77*   ALT  --   --   --   --  33  --  33  --  39  --   --   --  45  --  50   < > 42    < > = values in this interval not displayed.     CBC  Recent Labs   Lab 09/26/22  0400 09/25/22  1701 09/25/22  0350 09/24/22  1550   WBC 27.1* 27.4* 28.0* 32.6*   RBC 2.35* 2.45* 2.53* 2.50*   HGB 6.8* 7.0* 7.2* 7.3*   HCT 22.8* 23.7* 24.2* 23.4*   MCV 97 97 96 94   MCH 28.9 28.6 28.5 29.2   MCHC 29.8* 29.5* 29.8* 31.2*   RDW 17.0* 16.9* 16.6* 16.5*   * 544* 520* 484*     INR  Recent Labs   Lab 09/26/22  0400 09/25/22  0350 09/24/22  0304 09/23/22  0405   INR 1.20* 1.18* 1.16* 1.18*     Arterial Blood Gas  Recent Labs   Lab 09/26/22  0400 09/25/22  1701 09/25/22  0350 09/24/22  1550   PH 7.42 7.42 7.41 7.54*   PCO2 48* 48* 50* 36   PO2 114* 98 78* 65*   HCO3 31* 31* 32* 31*   O2PER 60 60 60 55

## 2022-09-26 NOTE — DEATH PRONOUNCEMENT
DEATH PRONOUNCEMENT    Called to pronounce Brian D Brunner dead.    Physical Exam: Unresponsive to noxious stimuli, Spontaneous respirations absent, Breath sounds absent, Carotid pulse absent, Heart sounds absent, Pupillary light reflex absent and Corneal blink reflex absent    Patient was pronounced dead at 1100 AM, 2022.    Cause of death: Cardiac arrest    Active Problems:    Acute ST elevation myocardial infarction (STEMI) (H)       Infectious disease present?: NO    Communicable disease present? (examples: HIV, chicken pox, TB, Ebola, CJD) :  NO    Multi-drug resistant organism present? (example: MRSA): NO    Please consider an autopsy if any of the following exist:  NO Unexpected or unexplained death during or following any dental, medical, or surgical diagnostic treatment procedures.     no Death of mother at or up to seven days after delivery.     no All  and pediatric deaths.     NO Death where the cause is sufficiently obscure to delay completion of the death certificate.   NO Deaths in which autopsy would confirm a suspected illness/condition that would affect surviving family members or recipients of transplanted organs.     The following deaths must be reported to the 's Office:  NO A death that may be due entirely or in part to any factors other than natural disease (recent surgery, recent trauma, suspected abuse/neglect).   NO A death that may be an accident, suicide, or homicide.     NO Any sudden, unexpected death in which there is no prior history of significant heart disease or any other condition associated with sudden death.   NO A death under suspicious, unusual, or unexpected circumstances.    NO Any death which is apparently due to natural causes but in which the  does not have a personal physician familiar with the patient s medical history, social, or environmental situation or the circumstances of the terminal event.   NO Any death apparently due to  Sudden Infant Death Syndrome.     NO Deaths that occur during, in association with, or as consequences of a diagnostic, therapeutic, or anesthetic procedure.   NO Any death in which a fracture of a major bone has occurred within the past (6) six months.   NO A death of persons note seen by their physician within 120 days of demise.     NO Any death in which the  was an inmate of a public institution or was in the custody of Law Enforcement personnel.   NO  All unexpected deaths of children   NO Solid organ donors   NO Unidentified bodies   NO Deaths of persons whose bodies are to be cremated or otherwise disposed of so that the bodies will later be unavailable for examination;   NO Deaths unattended by a physician outside of a licensed healthcare facility or licensed residential hospice program   NO Deaths occurring within 24 hours of arrival to a health care facility if death is unexpected.    NO Deaths associated with the decedent s employment.   NO Deaths attributed to acts of terrorism.   NO Any death in which there is uncertainty as to whether it is a medical examiner s care should be discussed with the medical investigator.        Body disposition: Autopsy was discussed with family member:  Sibling Kenneth and autopsy declined

## 2022-09-26 NOTE — PLAN OF CARE
OT 4C. Defer. Per chart review and conversation with RN, pt transitioning to comfort cares. No IP OT needs. Will complete OT orders.

## 2022-09-26 NOTE — PLAN OF CARE
ICU End of Shift Summary. See flowsheets for vital signs and detailed assessment.    Changes this shift: RASS -5, febrile w/ tmax of 101.9, PRN tylenol and ibuprofen given w/ good results, Levo between 0-0.03. No BM. Hgb 6.8, team notified.     Plan: transition to comfort cares    Goal Outcome Evaluation:    Plan of Care Reviewed With: patient     Overall Patient Progress: no change    Outcome Evaluation: RASS -5, febrile w/ tmax of 101.9, team notified, PRN tylenol and ibuprofen given w/ good results. Maintaining maps >65 on Levo 0.00-0.03

## 2022-09-26 NOTE — DISCHARGE SUMMARY
[address] [address]  p: [phone]    Discharge Summary: Cardiology Service    Brian D Brunner MRN# [id number]   YOB: 1960 Age: 61 year old       Admission Date: 9/12/2022  Discharge Date: 09/26/22      Discharge Diagnoses:  # Seizures  # Anoxic lisandro injury  # Cardiomyopathy  # Cardiac arrest  # Cardiogenic shock  # CAD c/b STEMI s/p PCI  # HTN  # VT storm  # Mural thrombus  # Shock liver 2/2 cardiac arrest  # Acute hypoxemic respiratory failure requiring intubation  # aspiration pneumonia  # Rib fractures  # pl effusions  # MARIANA  # Hypoalbuminemia   # Hypernatremia  # Anemia, secondary to acute blood loss and critical illness  # Thrombocytosis  # DM II    Pertinent Procedures:  Coronary angiogram  VA ECMO placement and decannulation  CT  Echo  Xrays    Consults:  Palliative  Nutrition    Brief HPI:  Brian Brunner was admitted to [facility] following an OOHCA 2/2 STEMI. Pt was found by a co-worker to be hanging from a forklift unresponsive. He was lowered to ground, CPR started, 911 was called, AED was placed and patient received 1 shock, along with 2 mg of epinephrine and 300 mg of Amiodarone with ROSC. Again shocked upon EMS arrival with ROSC. EKG showed ST elevation in the later leads. Patient was brought to  [facility] and taken directly to CCL where he had a coronary angiogram revealing 100% occluded mLAD s/p thrombectomy and JAMES, 80% LCx lesion s/p JAEMS, 100% occluded OM2 s/p JAMES. An IABP, cooling cath and arterial lines placed and patient was admitted to the ICU for further management. Unfortunately, on 9/13, patient went into VT storm requiring several shocks, Amio started, ROSC intermittently obtained but ultimately patient was so unstable, he was cannulated onto VA ECMO on 9/13. Underwent repeat angio for PCI of residual CAD on 9/16 with PCI to PCI to the LAD, OM3, and mid LCx with patent stents in those areas. PCI s/p JAMES x1 to  ostial RCA, JAMES x1 to mid LCx, JAMES x1 to distal LCx, and DESx1 to OM1. Patient was decannulated 9/19, IABP removed 9/20. Course c/b status myoclonus. Ultimately, patient was on maximum anti-epileptics and high dose sedation to suppress seizures without success. Poor prognosis was discussed with family and patient transitioned to comfort care 9/26/22. Patient passed away 9/26 @ 1100 am.      Subjective and Interval: NAEO. Sodium elevated as is BP, hydrochlorothiazide given, yesterday, diuresed, (still very net +) still seizing despite all medications. Fio2 up to 60%    Condition on discharge  See death note    Code status:  DNR    Time Spent on this Encounter   I, VLADIMIR Napier CNP, personally saw the patient today and spent greater than 40 minutes discharging this patient.    VLADIMIR Villagomez, CNP  [facility] Emergency Department  Interventional Cardiology-CSI Service  Pager [phone]       No care team member to display

## 2022-09-26 NOTE — PLAN OF CARE
[facility]      Patient Name: Brian D Brunner MRN# [id number]   Age: 61 year old YOB: 1960   Date of Admission:9/12/2022      ICU End of Shift Summary. Please see flowsheets for vital signs and detailed assessment data.    Changes this shift: Patient extubated at 1140 with time of death pronounced by primary as 1100. Belongings sent to security with patient.       Winston Gonzáles RN  Critical Care Flex Team

## 2022-09-26 NOTE — PROGRESS NOTES
BRIEF SW NOTE    SW was notified that pt's family had questions regarding cremation services. No family present at bedside so SW called pt's brother Aaron. Aaron shared that he connected with [facility] and they have a spot for pt. He denied needing any more /cremation service information at this time. SW offered sympathy and continued support.      JIL Zuleta, SW  4A/4C   Ph: [phone]  Pager: [phone]

## 2022-09-26 NOTE — PLAN OF CARE
Goal Outcome Evaluation:        ICU End of Shift Summary. See flowsheets for vital signs and detailed assessment.    Changes this shift: Comofort care orders placed this shift, DNR/DNI code status change.     Plan: Continue cares and medications until official withdrawal to comfort cares tomorrow per family requests.

## 2022-09-27 LAB
BACTERIA BLD CULT: NO GROWTH
BACTERIA BLD CULT: NO GROWTH

## 2022-09-29 LAB
AMPHET BLD CFM-MCNC: NEGATIVE NG/ML
APAP BLD-MCNC: NEGATIVE UG/ML
BACTERIA BLD CULT: NO GROWTH
BACTERIA BLD CULT: NO GROWTH
BARBITURATES SPEC-MCNC: NEGATIVE UG/ML
BENZODIAZ SPEC-MCNC: ABNORMAL NG/ML
BUPRENORPHINE SERPL-MCNC: NEGATIVE NG/ML
BZE BLD CFM-MCNC: NEGATIVE NG/ML
CARBOXYTHC BLD-MCNC: NEGATIVE NG/ML
CARISOPRODOL IA: NEGATIVE UG/ML
DECLARED MEDICATIONS: ABNORMAL
DRUGS FLD: POSITIVE
ETHANOL BLD-MCNC: NEGATIVE GM/DL
FENTANYL IA: ABNORMAL NG/ML
GABAPENTIN IA: NEGATIVE UG/ML
MEPERIDINE SERPLBLD-MCNC: NEGATIVE NG/ML
METHADONE SAL CFM-MCNC: NEGATIVE NG/ML
OPIATES SPEC-MCNC: NEGATIVE NG/ML
OXYCODONE SERPLBLD SCN-MCNC: NEGATIVE NG/ML
PCP SPEC-MCNC: NEGATIVE NG/ML
PROPOXYPH SPEC-MCNC: NEGATIVE NG/ML
TRAMADOL BLD-MCNC: NEGATIVE NG/ML

## 2024-07-02 NOTE — Clinical Note
Patient education provided.    Pt offers no complaints on arrival, labs dated 6/28/24 reviewed within parameters. CrCl calculated to be 97.5.

## 2025-07-10 NOTE — Clinical Note
Sent signed face to face encounter to [facility]  fax# [phone]. Confirmation rec'd     Stent removed and unable to cross.

## (undated) DEVICE — LINEN GOWN XLG 5407

## (undated) DEVICE — PREP POVIDONE IODINE SOLUTION 10% 4OZ BOTTLE 29906-004

## (undated) DEVICE — GLOVE BIOGEL PI ULTRATOUCH SZ 7.0 41170

## (undated) DEVICE — Device

## (undated) DEVICE — SU VICRYL 0 CT-1 27" UND J260H

## (undated) DEVICE — DRAPE TIBURON CARDIOVASCULAR PERI-GROIN LF 9154

## (undated) DEVICE — CLIP HORIZON SM RED WIDE SLOT 001201

## (undated) DEVICE — SUTURE BOOTS 051003PBX

## (undated) DEVICE — TIES BANDING T50R

## (undated) DEVICE — CATH BALLOON NC EMERGE 3.25X12MM H7493926712320

## (undated) DEVICE — PREP SKIN SCRUB TRAY 4461A

## (undated) DEVICE — GW 0.014IN X 180CM ASAHI FIELD

## (undated) DEVICE — CATH RX TAKERU PTCA BALLOON 1.5X12MM DC-RY1512UA1

## (undated) DEVICE — BLADE KNIFE SURG 10 371110

## (undated) DEVICE — PITCHER STERILE 1000ML  SSK9004A

## (undated) DEVICE — VESSEL LOOPS YELLOW MAXI 31145694

## (undated) DEVICE — INTRO SHEATH 8FRX10CM PINNACLE RSS802

## (undated) DEVICE — CATH BALLOON EMERGE 2.5X12MM H7493918912250

## (undated) DEVICE — LINEN TOWEL PACK X6 WHITE 5487

## (undated) DEVICE — CLIP HORIZON MED BLUE 002200

## (undated) DEVICE — TOURNIQUET VASCULAR KIT 7 1/2" 79012

## (undated) DEVICE — PREP POVIDONE-IODINE 7.5% SCRUB 4OZ BOTTLE MDS093945

## (undated) DEVICE — TAPE MEDIPORE 4"X2YD 2864

## (undated) DEVICE — CATH ANGIO SUPERTORQUE PLUS JL4 6FRX100CM 533620

## (undated) DEVICE — CATH GUIDING BLUE YELLOW PTFE XB3.5 6FRX100CM 67005400

## (undated) DEVICE — KIT CATHETER INDIGO RX 140CM WITH LG LUMEN ASP TUBING

## (undated) DEVICE — MANIFOLD KIT ANGIO AUTOMATED 014613

## (undated) DEVICE — 8FR GUARDIAN II NO CLICK HEMOSTASIS VALVE WITH GUIDEWIRE INSERTION TOOL (FORMERLY VASCULAR SOLUTIONS)

## (undated) DEVICE — CATH ANGIO INFINITI 3DRC 6FRX100CM 534676T

## (undated) DEVICE — CATH BALLOON NC EMERGE 2.75X12MM H7493926712270

## (undated) DEVICE — CATH BALLOON NC EMERGE 2.25X12MM H7493926712220

## (undated) DEVICE — VALVE HEMOSTASIS .096" COPILOT MECH 1003331

## (undated) DEVICE — TUBING PRESSURE 30"

## (undated) DEVICE — CATH QUATTRO 9.3FR  FEM INSTRN

## (undated) DEVICE — INTRO SHEATH MICRO PLATINUM TIP 4FRX40CM 7274

## (undated) DEVICE — CATH BALLOON NC EMERGE 2.00X20MM H7493926720200

## (undated) DEVICE — COVER CAMERA IN-LIGHT LENS LT-C02-P

## (undated) DEVICE — SU PROLENE 5-0 RB-2DA 30" 8710H

## (undated) DEVICE — DRAPE SHEET REV FOLD 3/4 9349

## (undated) DEVICE — CATH BALLOON EMERGE 2.0X12MM H7493918912200

## (undated) DEVICE — CATH BALLOON EMERGE OTW 1.5X20MM H7493919020150

## (undated) DEVICE — STENT CORONARY DES SYNERGY XD MR US 2.25X24MM H7493941824220: Type: IMPLANTABLE DEVICE | Status: NON-FUNCTIONAL

## (undated) DEVICE — WIRE GUIDE 0.035"X145CM AMPLATZ XSTIFF J THSCF-35-145-3-AES

## (undated) DEVICE — CATH BALLOON NC EMERGE 2.75X20MM H7493926720270

## (undated) DEVICE — CATH BALLOON EMERGE 1.5X20MM H7493918920150

## (undated) DEVICE — PACK HEART LEFT CUSTOM

## (undated) DEVICE — KIT ARTERIAL LINE 2GA 12CM INTRO NDL ASK-04510-HF

## (undated) DEVICE — INTRO SHEATH 6FRX25CM PINNACLE RSS606

## (undated) DEVICE — PROTECTOR ARM ONE-STEP TRENDELENBURG 40418

## (undated) DEVICE — INTRO SHEATH 9FRX10CM PINNACLE RSS902

## (undated) DEVICE — BLADE SAW STERNAL 20X30MM KM-32

## (undated) DEVICE — SU VICRYL 3-0 FS-1 27" J442H

## (undated) DEVICE — ESU ELEC BLADE E-SEP INSULATED NEPTUNE 70MM 0703-070-002

## (undated) DEVICE — ESU HOLSTER PLASTIC DISP E2400

## (undated) DEVICE — KIT DVC ANGIO IBASIXCOMPAK 13INX20ML 3WAY IN4430

## (undated) DEVICE — SOL NACL 0.9% IRRIG 1000ML BOTTLE 2F7124

## (undated) DEVICE — CANNULA VENOUS 25FR LONG

## (undated) DEVICE — KIT HAND CONTROL ACIST 016795

## (undated) DEVICE — SU PROLENE 4-0 SHDA 36" 8521H

## (undated) DEVICE — SHTH INTRO 0.021IN ID 6FR DIA

## (undated) DEVICE — ESU PENCIL SMOKE EVAC W/ROCKER SWITCH 0703-047-000

## (undated) DEVICE — CATH BALLOON NC EMERGE 3.50X12MM H7493926712350

## (undated) DEVICE — CATHETER BALLOON DILATATION TAKERU RX 2X12MM DC-RZ2012UA2

## (undated) DEVICE — CATH GUIDE EXT TRAPLINER HYDRPHLC 150CM 6.5FR 5568

## (undated) DEVICE — CATH BALLOON NC EMERGE 2.25X15MM H7493926715220

## (undated) DEVICE — CATH BALLOON IABP 7.5FRX50ML INTRA-AORTIC 0684-00-0576-01U

## (undated) DEVICE — GUIDEWIRE VASC 0.014INX180CM RUNTHROUGH 25-1011

## (undated) DEVICE — SUCTION MANIFOLD NEPTUNE 2 SYS 4 PORT 0702-020-000

## (undated) DEVICE — SU ETHIBOND 0 CT-1 CR 8X18" CX21D

## (undated) DEVICE — CATH ANGIO 6FR JL3.5 100CM ST+

## (undated) DEVICE — SU PROLENE 6-0 C-1DA 30" 8706H

## (undated) DEVICE — GLOVE PROTEXIS POWDER FREE 7.0 ORTHOPEDIC 2D73ET70

## (undated) DEVICE — CATHETER BALLOON DILATATION TAKERU RX 2.5X12MM DC-RZ2512UA2

## (undated) DEVICE — LINEN TOWEL PACK X30 5481

## (undated) DEVICE — CANNULA ART 17FR 3/8IN 23CM 2 SH BIOLINE 70105.3082

## (undated) DEVICE — CATH BALLOON NC EMERGE 3.25X20MM H7493926720320

## (undated) DEVICE — SU SILK 0 TIE 6X30" A306H

## (undated) DEVICE — DEFIB PRO-PADZ LVP LQD GEL ADULT 8900-2105-01

## (undated) DEVICE — LABEL MEDICATION SYSTEM 3303-P

## (undated) DEVICE — INTRODUCER SHEATH 8FRX24CM ARROW FLEX CL-07824

## (undated) DEVICE — ESU GROUND PAD ADULT W/CORD E7507

## (undated) DEVICE — CATH ANGIO SUPERTORQUE PLUS JR4 6FRX100CM 533621

## (undated) DEVICE — CANISTER ENGINE FOR INDIGO SYSTEM

## (undated) DEVICE — DRAPE STERI INCISE 1050

## (undated) DEVICE — SU PROLENE 4-0 RB-1DA 36" 8557H

## (undated) DEVICE — SYR 10ML LL W/O NDL 302995

## (undated) DEVICE — SU VICRYL 2-0 CT-1 27" UND J259H

## (undated) RX ORDER — POTASSIUM CHLORIDE 29.8 MG/ML
INJECTION INTRAVENOUS
Status: DISPENSED
Start: 2022-01-01

## (undated) RX ORDER — FENTANYL CITRATE 50 UG/ML
INJECTION, SOLUTION INTRAMUSCULAR; INTRAVENOUS
Status: DISPENSED
Start: 2022-01-01

## (undated) RX ORDER — CEFAZOLIN SODIUM 1 G/3ML
INJECTION, POWDER, FOR SOLUTION INTRAMUSCULAR; INTRAVENOUS
Status: DISPENSED
Start: 2022-01-01

## (undated) RX ORDER — ADENOSINE 3 MG/ML
INJECTION, SOLUTION INTRAVENOUS
Status: DISPENSED
Start: 2022-01-01

## (undated) RX ORDER — PROPOFOL 10 MG/ML
INJECTION, EMULSION INTRAVENOUS
Status: DISPENSED
Start: 2022-01-01

## (undated) RX ORDER — NOREPINEPHRINE BITARTRATE 0.06 MG/ML
INJECTION, SOLUTION INTRAVENOUS
Status: DISPENSED
Start: 2022-01-01

## (undated) RX ORDER — HEPARIN SODIUM 1000 [USP'U]/ML
INJECTION, SOLUTION INTRAVENOUS; SUBCUTANEOUS
Status: DISPENSED
Start: 2022-01-01

## (undated) RX ORDER — MIDAZOLAM HCL IN 0.9 % NACL/PF 1 MG/ML
PLASTIC BAG, INJECTION (ML) INTRAVENOUS
Status: DISPENSED
Start: 2022-01-01

## (undated) RX ORDER — ASPIRIN 325 MG
TABLET ORAL
Status: DISPENSED
Start: 2022-01-01